# Patient Record
Sex: FEMALE | Race: WHITE | NOT HISPANIC OR LATINO | Employment: UNEMPLOYED | URBAN - METROPOLITAN AREA
[De-identification: names, ages, dates, MRNs, and addresses within clinical notes are randomized per-mention and may not be internally consistent; named-entity substitution may affect disease eponyms.]

---

## 2021-03-03 ENCOUNTER — HOSPITAL ENCOUNTER (INPATIENT)
Facility: HOSPITAL | Age: 61
LOS: 6 days | Discharge: HOME | End: 2021-03-09
Attending: EMERGENCY MEDICINE | Admitting: INTERNAL MEDICINE

## 2021-03-03 DIAGNOSIS — R13.10 DYSPHAGIA, UNSPECIFIED TYPE: ICD-10-CM

## 2021-03-03 DIAGNOSIS — E87.1 HYPONATREMIA: Primary | ICD-10-CM

## 2021-03-03 DIAGNOSIS — R53.1 GENERAL WEAKNESS: ICD-10-CM

## 2021-03-03 LAB
ALBUMIN SERPL-MCNC: 4.7 G/DL (ref 3.5–5.2)
ALBUMIN/GLOB SERPL: 2 G/DL
ALP SERPL-CCNC: 55 U/L (ref 39–117)
ALT SERPL W P-5'-P-CCNC: <5 U/L (ref 1–33)
ANION GAP SERPL CALCULATED.3IONS-SCNC: 12 MMOL/L (ref 5–15)
AST SERPL-CCNC: 42 U/L (ref 1–32)
B-HCG UR QL: NEGATIVE
BASOPHILS # BLD AUTO: 0.01 10*3/MM3 (ref 0–0.2)
BASOPHILS NFR BLD AUTO: 0.1 % (ref 0–1.5)
BILIRUB SERPL-MCNC: 1 MG/DL (ref 0–1.2)
BUN SERPL-MCNC: 8 MG/DL (ref 8–23)
BUN/CREAT SERPL: 12.9 (ref 7–25)
CALCIUM SPEC-SCNC: 9.3 MG/DL (ref 8.6–10.5)
CHLORIDE SERPL-SCNC: 73 MMOL/L (ref 98–107)
CO2 SERPL-SCNC: 22 MMOL/L (ref 22–29)
CREAT SERPL-MCNC: 0.62 MG/DL (ref 0.57–1)
D-LACTATE SERPL-SCNC: 1.1 MMOL/L (ref 0.5–2)
DEPRECATED RDW RBC AUTO: 35.5 FL (ref 37–54)
EOSINOPHIL # BLD AUTO: 0 10*3/MM3 (ref 0–0.4)
EOSINOPHIL NFR BLD AUTO: 0 % (ref 0.3–6.2)
ERYTHROCYTE [DISTWIDTH] IN BLOOD BY AUTOMATED COUNT: 11.6 % (ref 12.3–15.4)
GFR SERPL CREATININE-BSD FRML MDRD: 98 ML/MIN/1.73
GLOBULIN UR ELPH-MCNC: 2.4 GM/DL
GLUCOSE SERPL-MCNC: 114 MG/DL (ref 65–99)
HCT VFR BLD AUTO: 33.3 % (ref 34–46.6)
HGB BLD-MCNC: 12.3 G/DL (ref 12–15.9)
HOLD SPECIMEN: NORMAL
IMM GRANULOCYTES # BLD AUTO: 0.02 10*3/MM3 (ref 0–0.05)
IMM GRANULOCYTES NFR BLD AUTO: 0.2 % (ref 0–0.5)
INTERNAL NEGATIVE CONTROL: NEGATIVE
INTERNAL POSITIVE CONTROL: POSITIVE
LIPASE SERPL-CCNC: 17 U/L (ref 13–60)
LYMPHOCYTES # BLD AUTO: 1.09 10*3/MM3 (ref 0.7–3.1)
LYMPHOCYTES NFR BLD AUTO: 11.5 % (ref 19.6–45.3)
Lab: NORMAL
MCH RBC QN AUTO: 31.6 PG (ref 26.6–33)
MCHC RBC AUTO-ENTMCNC: 36.9 G/DL (ref 31.5–35.7)
MCV RBC AUTO: 85.6 FL (ref 79–97)
MONOCYTES # BLD AUTO: 0.76 10*3/MM3 (ref 0.1–0.9)
MONOCYTES NFR BLD AUTO: 8 % (ref 5–12)
NEUTROPHILS NFR BLD AUTO: 7.61 10*3/MM3 (ref 1.7–7)
NEUTROPHILS NFR BLD AUTO: 80.2 % (ref 42.7–76)
NRBC BLD AUTO-RTO: 0 /100 WBC (ref 0–0.2)
PLATELET # BLD AUTO: 268 10*3/MM3 (ref 140–450)
PMV BLD AUTO: 8.9 FL (ref 6–12)
POTASSIUM SERPL-SCNC: 3.5 MMOL/L (ref 3.5–5.2)
PROT SERPL-MCNC: 7.1 G/DL (ref 6–8.5)
RBC # BLD AUTO: 3.89 10*6/MM3 (ref 3.77–5.28)
SODIUM SERPL-SCNC: 107 MMOL/L (ref 136–145)
WBC # BLD AUTO: 9.49 10*3/MM3 (ref 3.4–10.8)
WHOLE BLOOD HOLD SPECIMEN: NORMAL
WHOLE BLOOD HOLD SPECIMEN: NORMAL

## 2021-03-03 PROCEDURE — 80053 COMPREHEN METABOLIC PANEL: CPT | Performed by: EMERGENCY MEDICINE

## 2021-03-03 PROCEDURE — 84300 ASSAY OF URINE SODIUM: CPT | Performed by: INTERNAL MEDICINE

## 2021-03-03 PROCEDURE — 80048 BASIC METABOLIC PNL TOTAL CA: CPT | Performed by: EMERGENCY MEDICINE

## 2021-03-03 PROCEDURE — 83930 ASSAY OF BLOOD OSMOLALITY: CPT | Performed by: INTERNAL MEDICINE

## 2021-03-03 PROCEDURE — 82533 TOTAL CORTISOL: CPT | Performed by: INTERNAL MEDICINE

## 2021-03-03 PROCEDURE — 85025 COMPLETE CBC W/AUTO DIFF WBC: CPT

## 2021-03-03 PROCEDURE — 81001 URINALYSIS AUTO W/SCOPE: CPT | Performed by: EMERGENCY MEDICINE

## 2021-03-03 PROCEDURE — 83935 ASSAY OF URINE OSMOLALITY: CPT | Performed by: INTERNAL MEDICINE

## 2021-03-03 PROCEDURE — 84443 ASSAY THYROID STIM HORMONE: CPT | Performed by: NURSE PRACTITIONER

## 2021-03-03 PROCEDURE — 99284 EMERGENCY DEPT VISIT MOD MDM: CPT

## 2021-03-03 PROCEDURE — 81025 URINE PREGNANCY TEST: CPT | Performed by: EMERGENCY MEDICINE

## 2021-03-03 PROCEDURE — 83690 ASSAY OF LIPASE: CPT

## 2021-03-03 PROCEDURE — 83605 ASSAY OF LACTIC ACID: CPT

## 2021-03-03 PROCEDURE — 84439 ASSAY OF FREE THYROXINE: CPT | Performed by: NURSE PRACTITIONER

## 2021-03-03 RX ORDER — SODIUM CHLORIDE 9 MG/ML
10 INJECTION INTRAVENOUS AS NEEDED
Status: DISCONTINUED | OUTPATIENT
Start: 2021-03-03 | End: 2021-03-05

## 2021-03-04 ENCOUNTER — APPOINTMENT (OUTPATIENT)
Dept: CT IMAGING | Facility: HOSPITAL | Age: 61
End: 2021-03-04

## 2021-03-04 ENCOUNTER — APPOINTMENT (OUTPATIENT)
Dept: GENERAL RADIOLOGY | Facility: HOSPITAL | Age: 61
End: 2021-03-04

## 2021-03-04 PROBLEM — F32.A DEPRESSION: Status: ACTIVE | Noted: 2021-03-04

## 2021-03-04 PROBLEM — G20.C PARKINSONISM: Status: ACTIVE | Noted: 2021-03-04

## 2021-03-04 PROBLEM — G20 PARKINSONISM: Status: ACTIVE | Noted: 2021-03-04

## 2021-03-04 PROBLEM — Z86.69 HISTORY OF MIGRAINE HEADACHES: Status: ACTIVE | Noted: 2021-03-04

## 2021-03-04 PROBLEM — E03.9 HYPOTHYROIDISM: Status: ACTIVE | Noted: 2021-03-04

## 2021-03-04 PROBLEM — E87.1 HYPONATREMIA: Status: ACTIVE | Noted: 2021-03-04

## 2021-03-04 PROBLEM — Z85.3 HX OF BREAST CANCER: Status: ACTIVE | Noted: 2021-03-04

## 2021-03-04 LAB
ANION GAP SERPL CALCULATED.3IONS-SCNC: 10 MMOL/L (ref 5–15)
BACTERIA UR QL AUTO: ABNORMAL /HPF
BASOPHILS # BLD AUTO: 0 10*3/MM3 (ref 0–0.2)
BASOPHILS NFR BLD AUTO: 0 % (ref 0–1.5)
BILIRUB UR QL STRIP: NEGATIVE
BUN SERPL-MCNC: 8 MG/DL (ref 8–23)
BUN/CREAT SERPL: 13.6 (ref 7–25)
CALCIUM SPEC-SCNC: 9 MG/DL (ref 8.6–10.5)
CHLORIDE SERPL-SCNC: 74 MMOL/L (ref 98–107)
CLARITY UR: CLEAR
CO2 SERPL-SCNC: 23 MMOL/L (ref 22–29)
COLOR UR: YELLOW
CORTIS SERPL-MCNC: 42.38 MCG/DL
CREAT SERPL-MCNC: 0.59 MG/DL (ref 0.57–1)
DEPRECATED RDW RBC AUTO: 35.7 FL (ref 37–54)
EOSINOPHIL # BLD AUTO: 0 10*3/MM3 (ref 0–0.4)
EOSINOPHIL NFR BLD AUTO: 0 % (ref 0.3–6.2)
ERYTHROCYTE [DISTWIDTH] IN BLOOD BY AUTOMATED COUNT: 11.5 % (ref 12.3–15.4)
FLUAV RNA RESP QL NAA+PROBE: NOT DETECTED
FLUBV RNA RESP QL NAA+PROBE: NOT DETECTED
GFR SERPL CREATININE-BSD FRML MDRD: 104 ML/MIN/1.73
GLUCOSE SERPL-MCNC: 113 MG/DL (ref 65–99)
GLUCOSE UR STRIP-MCNC: ABNORMAL MG/DL
HCT VFR BLD AUTO: 32.4 % (ref 34–46.6)
HGB BLD-MCNC: 11.9 G/DL (ref 12–15.9)
HGB UR QL STRIP.AUTO: NEGATIVE
HYALINE CASTS UR QL AUTO: ABNORMAL /LPF
IMM GRANULOCYTES # BLD AUTO: 0.03 10*3/MM3 (ref 0–0.05)
IMM GRANULOCYTES NFR BLD AUTO: 0.3 % (ref 0–0.5)
KETONES UR QL STRIP: ABNORMAL
LEUKOCYTE ESTERASE UR QL STRIP.AUTO: NEGATIVE
LYMPHOCYTES # BLD AUTO: 1.15 10*3/MM3 (ref 0.7–3.1)
LYMPHOCYTES NFR BLD AUTO: 11.9 % (ref 19.6–45.3)
MAGNESIUM SERPL-MCNC: 1.5 MG/DL (ref 1.6–2.4)
MCH RBC QN AUTO: 31.3 PG (ref 26.6–33)
MCHC RBC AUTO-ENTMCNC: 36.7 G/DL (ref 31.5–35.7)
MCV RBC AUTO: 85.3 FL (ref 79–97)
MONOCYTES # BLD AUTO: 0.92 10*3/MM3 (ref 0.1–0.9)
MONOCYTES NFR BLD AUTO: 9.6 % (ref 5–12)
MUCOUS THREADS URNS QL MICRO: ABNORMAL /HPF
NEUTROPHILS NFR BLD AUTO: 7.53 10*3/MM3 (ref 1.7–7)
NEUTROPHILS NFR BLD AUTO: 78.2 % (ref 42.7–76)
NITRITE UR QL STRIP: NEGATIVE
NRBC BLD AUTO-RTO: 0 /100 WBC (ref 0–0.2)
OSMOLALITY SERPL: 227 MOSM/KG (ref 275–295)
OSMOLALITY UR: 509 MOSM/KG (ref 300–1100)
PH UR STRIP.AUTO: 6 [PH] (ref 5–8)
PLATELET # BLD AUTO: 258 10*3/MM3 (ref 140–450)
PMV BLD AUTO: 9 FL (ref 6–12)
POTASSIUM SERPL-SCNC: 3.5 MMOL/L (ref 3.5–5.2)
PROT UR QL STRIP: ABNORMAL
RBC # BLD AUTO: 3.8 10*6/MM3 (ref 3.77–5.28)
RBC # UR: ABNORMAL /HPF
REF LAB TEST METHOD: ABNORMAL
SARS-COV-2 RNA RESP QL NAA+PROBE: NOT DETECTED
SODIUM SERPL-SCNC: 107 MMOL/L (ref 136–145)
SODIUM SERPL-SCNC: 111 MMOL/L (ref 136–145)
SODIUM SERPL-SCNC: 114 MMOL/L (ref 136–145)
SODIUM SERPL-SCNC: 114 MMOL/L (ref 136–145)
SODIUM SERPL-SCNC: 115 MMOL/L (ref 136–145)
SODIUM SERPL-SCNC: 119 MMOL/L (ref 136–145)
SODIUM UR-SCNC: <20 MMOL/L
SP GR UR STRIP: 1.03 (ref 1–1.03)
SQUAMOUS #/AREA URNS HPF: ABNORMAL /HPF
T4 FREE SERPL-MCNC: 1.86 NG/DL (ref 0.93–1.7)
TSH SERPL DL<=0.05 MIU/L-ACNC: 1.26 UIU/ML (ref 0.27–4.2)
UROBILINOGEN UR QL STRIP: ABNORMAL
WBC # BLD AUTO: 9.63 10*3/MM3 (ref 3.4–10.8)
WBC UR QL AUTO: ABNORMAL /HPF

## 2021-03-04 PROCEDURE — 85025 COMPLETE CBC W/AUTO DIFF WBC: CPT | Performed by: INTERNAL MEDICINE

## 2021-03-04 PROCEDURE — 25010000002 ONDANSETRON PER 1 MG: Performed by: EMERGENCY MEDICINE

## 2021-03-04 PROCEDURE — 25010000002 ENOXAPARIN PER 10 MG: Performed by: NURSE PRACTITIONER

## 2021-03-04 PROCEDURE — 70450 CT HEAD/BRAIN W/O DYE: CPT

## 2021-03-04 PROCEDURE — 74177 CT ABD & PELVIS W/CONTRAST: CPT

## 2021-03-04 PROCEDURE — 71045 X-RAY EXAM CHEST 1 VIEW: CPT

## 2021-03-04 PROCEDURE — 87636 SARSCOV2 & INF A&B AMP PRB: CPT | Performed by: INTERNAL MEDICINE

## 2021-03-04 PROCEDURE — 99223 1ST HOSP IP/OBS HIGH 75: CPT | Performed by: INTERNAL MEDICINE

## 2021-03-04 PROCEDURE — 25010000002 MAGNESIUM SULFATE 2 GM/50ML SOLUTION: Performed by: INTERNAL MEDICINE

## 2021-03-04 PROCEDURE — 84295 ASSAY OF SERUM SODIUM: CPT | Performed by: INTERNAL MEDICINE

## 2021-03-04 PROCEDURE — 83735 ASSAY OF MAGNESIUM: CPT | Performed by: INTERNAL MEDICINE

## 2021-03-04 PROCEDURE — 25010000002 IOPAMIDOL 61 % SOLUTION: Performed by: EMERGENCY MEDICINE

## 2021-03-04 RX ORDER — MAGNESIUM SULFATE HEPTAHYDRATE 40 MG/ML
2 INJECTION, SOLUTION INTRAVENOUS AS NEEDED
Status: DISCONTINUED | OUTPATIENT
Start: 2021-03-04 | End: 2021-03-09 | Stop reason: HOSPADM

## 2021-03-04 RX ORDER — LEVOTHYROXINE SODIUM 0.07 MG/1
75 TABLET ORAL DAILY
COMMUNITY

## 2021-03-04 RX ORDER — ONDANSETRON HYDROCHLORIDE 8 MG/1
TABLET, FILM COATED ORAL EVERY 8 HOURS PRN
COMMUNITY
End: 2023-02-02

## 2021-03-04 RX ORDER — POTASSIUM CHLORIDE 7.45 MG/ML
10 INJECTION INTRAVENOUS
Status: DISCONTINUED | OUTPATIENT
Start: 2021-03-04 | End: 2021-03-09 | Stop reason: HOSPADM

## 2021-03-04 RX ORDER — SODIUM CHLORIDE 9 MG/ML
100 INJECTION, SOLUTION INTRAVENOUS CONTINUOUS
Status: DISCONTINUED | OUTPATIENT
Start: 2021-03-04 | End: 2021-03-04

## 2021-03-04 RX ORDER — MAGNESIUM SULFATE HEPTAHYDRATE 40 MG/ML
4 INJECTION, SOLUTION INTRAVENOUS AS NEEDED
Status: DISCONTINUED | OUTPATIENT
Start: 2021-03-04 | End: 2021-03-09 | Stop reason: HOSPADM

## 2021-03-04 RX ORDER — SODIUM CHLORIDE 0.9 % (FLUSH) 0.9 %
10 SYRINGE (ML) INJECTION EVERY 12 HOURS SCHEDULED
Status: DISCONTINUED | OUTPATIENT
Start: 2021-03-04 | End: 2021-03-09 | Stop reason: HOSPADM

## 2021-03-04 RX ORDER — POTASSIUM CHLORIDE 1.5 G/1.77G
40 POWDER, FOR SOLUTION ORAL AS NEEDED
Status: DISCONTINUED | OUTPATIENT
Start: 2021-03-04 | End: 2021-03-09 | Stop reason: HOSPADM

## 2021-03-04 RX ORDER — UBIDECARENONE 75 MG
50 CAPSULE ORAL DAILY
COMMUNITY

## 2021-03-04 RX ORDER — MULTIPLE VITAMINS W/ MINERALS TAB 9MG-400MCG
1 TAB ORAL DAILY
COMMUNITY

## 2021-03-04 RX ORDER — ESCITALOPRAM OXALATE 10 MG/1
10 TABLET ORAL DAILY
COMMUNITY
End: 2023-02-02

## 2021-03-04 RX ORDER — ONDANSETRON 2 MG/ML
4 INJECTION INTRAMUSCULAR; INTRAVENOUS ONCE
Status: COMPLETED | OUTPATIENT
Start: 2021-03-04 | End: 2021-03-04

## 2021-03-04 RX ORDER — SODIUM CHLORIDE 0.9 % (FLUSH) 0.9 %
10 SYRINGE (ML) INJECTION AS NEEDED
Status: DISCONTINUED | OUTPATIENT
Start: 2021-03-04 | End: 2021-03-09 | Stop reason: HOSPADM

## 2021-03-04 RX ORDER — PANTOPRAZOLE SODIUM 40 MG/10ML
40 INJECTION, POWDER, LYOPHILIZED, FOR SOLUTION INTRAVENOUS
Status: DISCONTINUED | OUTPATIENT
Start: 2021-03-04 | End: 2021-03-05

## 2021-03-04 RX ORDER — POTASSIUM CHLORIDE 750 MG/1
40 CAPSULE, EXTENDED RELEASE ORAL AS NEEDED
Status: DISCONTINUED | OUTPATIENT
Start: 2021-03-04 | End: 2021-03-09 | Stop reason: HOSPADM

## 2021-03-04 RX ORDER — ACETAMINOPHEN 325 MG/1
650 TABLET ORAL EVERY 6 HOURS PRN
Status: DISCONTINUED | OUTPATIENT
Start: 2021-03-04 | End: 2021-03-09 | Stop reason: HOSPADM

## 2021-03-04 RX ADMIN — ONDANSETRON 4 MG: 2 INJECTION INTRAMUSCULAR; INTRAVENOUS at 00:29

## 2021-03-04 RX ADMIN — POTASSIUM CHLORIDE 40 MEQ: 10 CAPSULE, COATED, EXTENDED RELEASE ORAL at 10:41

## 2021-03-04 RX ADMIN — PANTOPRAZOLE SODIUM 40 MG: 40 INJECTION, POWDER, FOR SOLUTION INTRAVENOUS at 06:18

## 2021-03-04 RX ADMIN — IOPAMIDOL 100 ML: 612 INJECTION, SOLUTION INTRAVENOUS at 01:14

## 2021-03-04 RX ADMIN — MAGNESIUM SULFATE HEPTAHYDRATE 2 G: 2 INJECTION, SOLUTION INTRAVENOUS at 13:30

## 2021-03-04 RX ADMIN — SODIUM CHLORIDE, PRESERVATIVE FREE 10 ML: 5 INJECTION INTRAVENOUS at 20:08

## 2021-03-04 RX ADMIN — SODIUM CHLORIDE 125 ML/HR: 9 INJECTION, SOLUTION INTRAVENOUS at 01:42

## 2021-03-04 RX ADMIN — ENOXAPARIN SODIUM 40 MG: 40 INJECTION SUBCUTANEOUS at 08:36

## 2021-03-04 RX ADMIN — POTASSIUM CHLORIDE 40 MEQ: 10 CAPSULE, COATED, EXTENDED RELEASE ORAL at 15:33

## 2021-03-04 RX ADMIN — MAGNESIUM SULFATE HEPTAHYDRATE 2 G: 2 INJECTION, SOLUTION INTRAVENOUS at 10:41

## 2021-03-04 RX ADMIN — MAGNESIUM SULFATE HEPTAHYDRATE 2 G: 2 INJECTION, SOLUTION INTRAVENOUS at 15:33

## 2021-03-04 RX ADMIN — ACETAMINOPHEN 650 MG: 325 TABLET ORAL at 09:15

## 2021-03-04 NOTE — PROGRESS NOTES
"Clinical Nutrition Note      Patient Name: Carolina Duarte  MRN: [id number]  Admission date: 3/3/2021      Multidisciplinary Rounds    Additional information obtained during MDR:  Pt adm w/ hyponatremia; close monitoring for today. Okay to begin diet , no fld restriction at this time likely medication related.    Current diet: Diet Regular    Oral Nutrition Supplement:     Pertinent medical data reviewed:  No nutrition risk identified on nursing screen; MST score \"0\"    Intervention:  Menu provided, pt advised of alternate selections; menu adjusted  Plan of care and goals reviewed    Monitor:  RD to follow per protocol      Nori Ochoa MS,RD,LD  03/04/21 14:34 EST  Time: 15  mins       "

## 2021-03-04 NOTE — ED PROVIDER NOTES
EMERGENCY DEPARTMENT ENCOUNTER      Pt Name: Carolina Duarte  MRN: [id number]  YOB: 1960  Date of evaluation: 3/3/2021  Provider: Kishan Mcgarry DO    CHIEF COMPLAINT       Chief Complaint   Patient presents with   • Vomiting         HISTORY OF PRESENT ILLNESS  (Location/Symptom, Timing/Onset, Context/Setting, Quality, Duration, Modifying Factors, Severity.)   Carolina Duarte is a 60 y.o. female who presents to the emergency department for evaluation of generalized weakness and worsening over the last few days, dehydration concerned secondary to increased nausea and vomiting.  Denies any diarrhea.  Patient denies any fever chills or known sick contacts.  She does a few days ago felt some mild chest pain is since resolved, denies any abdominal pain or flank pain.  No recent changes to her medications.  She does underlying after diagnosis of Parkinson's for which she takes medications.  She denies any recent medication adjustments.  Eyes notes patient has been increasingly weaker, having difficulties with ambulation recently.  No fall, no head trauma or injury.  Patient denies any unilateral weakness, numbness or tingling, difficulties with speech.  She has no other acute systemic complaints at this time.      Nursing notes were reviewed.    REVIEW OF SYSTEMS    (2-9 systems for level 4, 10 or more for level 5)   ROS:  General:  No fevers, no chills, + generalized weakness  Cardiovascular:  No chest pain, no palpitations  Respiratory:  No shortness of breath, no cough, no wheezing  Gastrointestinal:  No pain, positive nausea, vomiting, no diarrhea  Musculoskeletal:  No muscle pain, no joint pain  Skin:  No rash, no easy bruising  Neurologic:  No speech problems, no headache, no extremity numbness, no extremity tingling, no extremity weakness  Psychiatric:  No anxiety  Genitourinary:  No dysuria, no hematuria    Except as noted above the remainder of the review of systems was reviewed and  negative.       PAST MEDICAL HISTORY   No past medical history on file.      SURGICAL HISTORY     No past surgical history on file.      CURRENT MEDICATIONS       Current Facility-Administered Medications:   •  enoxaparin (LOVENOX) syringe 40 mg, 40 mg, Subcutaneous, Q24H, Anitha Franks APRN  •  influenza vac split quad (FLUZONE,FLUARIX,AFLURIA,FLULAVAL) injection 0.5 mL, 0.5 mL, Intramuscular, During Hospitalization, Anitha Franks APRN  •  pantoprazole (PROTONIX) injection 40 mg, 40 mg, Intravenous, Q AM, Anitha Franks APRN  •  Sodium Chloride (PF) 0.9 % 10 mL, 10 mL, Intravenous, PRN, Kishan Mcgarry DO  •  sodium chloride 0.9 % flush 10 mL, 10 mL, Intravenous, Q12H, Anitha Franks APRN  •  sodium chloride 0.9 % flush 10 mL, 10 mL, Intravenous, PRN, Anitha rFanks APRN  •  sodium chloride 0.9 % infusion, 125 mL/hr, Intravenous, Continuous, Kishan Mcgarry DO, Last Rate: 125 mL/hr at 03/04/21 0142, 125 mL/hr at 03/04/21 0142  No current outpatient medications on file.    ALLERGIES     Patient has no known allergies.    FAMILY HISTORY     No family history on file.       SOCIAL HISTORY       Social History     Socioeconomic History   • Marital status:      Spouse name: Not on file   • Number of children: Not on file   • Years of education: Not on file   • Highest education level: Not on file         PHYSICAL EXAM    (up to 7 for level 4, 8 or more for level 5)     Vitals:    03/04/21 0200 03/04/21 0215 03/04/21 0230 03/04/21 0245   BP: 142/82 130/69 136/59 114/70   Pulse: 77 80 81 80   Resp:       Temp:       SpO2: 94% 96% 97% 92%   Weight:       Height:           Physical Exam  General : Patient is awake, alert, oriented, in no acute distress, nontoxic appearing, mild dementia noted  HEENT: Pupils are equally round and reactive to light, EOMI, conjunctivae clear  Neck: Neck is supple, full range of motion, trachea midline  Cardiac: Heart regular rate, rhythm, positive systolic ejection  murmur  Lungs: Lungs are clear to auscultation, there is no wheezing, rhonchi, or rales. There is no use of accessory muscles  Abdomen: Abdomen is soft, nontender, nondistended. There are no firm or pulsatile masses, no rebound rigidity or guarding.   Musculoskeletal: 5 out of 5 strength in all 4 extremities.  No focal muscle deficits are appreciated  Neuro: Motor intact, sensory intact, level of consciousness is normal, patient is awake alert answer questions appropriately, no focal neurological deficit on examination, no unilateral weakness.  Very mild resting tremor.  Dermatology: Skin is warm and dry  Psych: Mentation is grossly normal, cognition is consistent with mild dementia, parkinsonian changes noted.  Mild. Affect is appropriate.      DIAGNOSTIC RESULTS     EKG: All EKG's are interpreted by the Emergency Department Physician who either signs or Co-signs this chart in the absence of a cardiologist.    No orders to display       RADIOLOGY:   Non-plain film images such as CT, Ultrasound and MRI are read by the radiologist. Plain radiographic images are visualized and preliminarily interpreted by the emergency physician with the below findings:      [] Radiologist's Report Reviewed:  CT Abdomen Pelvis With Contrast   Final Result   1.  No acute abnormality seen within the abdomen or pelvis.   2.  Tiny hiatal hernia and potential mild distal esophageal wall thickening which may indicate active esophagitis.      Signer Name: Efrain Rogers MD    Signed: 3/4/2021 1:35 AM    Workstation Name: [facility]     [facility]      CT Head Without Contrast   Final Result   Negative head CT examination.      Signer Name: Efrain Rogers MD    Signed: 3/4/2021 1:31 AM    [facility]: [facility]     [facility]            ED BEDSIDE ULTRASOUND:   Performed by ED Physician - none    LABS:    I have reviewed and interpreted all of the currently available lab  results from this visit (if applicable):  Results for orders placed or performed during the hospital encounter of 03/03/21   Comprehensive Metabolic Panel    Specimen: Blood   Result Value Ref Range    Glucose 114 (H) 65 - 99 mg/dL    BUN 8 8 - 23 mg/dL    Creatinine 0.62 0.57 - 1.00 mg/dL    Sodium 107 (C) 136 - 145 mmol/L    Potassium 3.5 3.5 - 5.2 mmol/L    Chloride 73 (L) 98 - 107 mmol/L    CO2 22.0 22.0 - 29.0 mmol/L    Calcium 9.3 8.6 - 10.5 mg/dL    Total Protein 7.1 6.0 - 8.5 g/dL    Albumin 4.70 3.50 - 5.20 g/dL    ALT (SGPT) <5 1 - 33 U/L    AST (SGOT) 42 (H) 1 - 32 U/L    Alkaline Phosphatase 55 39 - 117 U/L    Total Bilirubin 1.0 0.0 - 1.2 mg/dL    eGFR Non African Amer 98 >60 mL/min/1.73    Globulin 2.4 gm/dL    A/G Ratio 2.0 g/dL    BUN/Creatinine Ratio 12.9 7.0 - 25.0    Anion Gap 12.0 5.0 - 15.0 mmol/L   Lipase    Specimen: Blood   Result Value Ref Range    Lipase 17 13 - 60 U/L   Urinalysis With Microscopic If Indicated (No Culture) - Urine, Clean Catch    Specimen: Urine, Clean Catch   Result Value Ref Range    Color, UA Yellow Yellow, Straw    Appearance, UA Clear Clear    pH, UA 6.0 5.0 - 8.0    Specific Gravity, UA 1.029 1.001 - 1.030    Glucose,  mg/dL (Trace) (A) Negative    Ketones, UA 80 mg/dL (3+) (A) Negative    Bilirubin, UA Negative Negative    Blood, UA Negative Negative    Protein, UA 30 mg/dL (1+) (A) Negative    Leuk Esterase, UA Negative Negative    Nitrite, UA Negative Negative    Urobilinogen, UA 0.2 E.U./dL 0.2 - 1.0 E.U./dL   Lactic Acid, Plasma    Specimen: Blood   Result Value Ref Range    Lactate 1.1 0.5 - 2.0 mmol/L   Gray Top - Ice   Result Value Ref Range    Extra Tube Hold for add-ons.    CBC Auto Differential    Specimen: Blood   Result Value Ref Range    WBC 9.49 3.40 - 10.80 10*3/mm3    RBC 3.89 3.77 - 5.28 10*6/mm3    Hemoglobin 12.3 12.0 - 15.9 g/dL    Hematocrit 33.3 (L) 34.0 - 46.6 %    MCV 85.6 79.0 - 97.0 fL    MCH 31.6 26.6 - 33.0 pg    MCHC 36.9 (H) 31.5  - 35.7 g/dL    RDW 11.6 (L) 12.3 - 15.4 %    RDW-SD 35.5 (L) 37.0 - 54.0 fl    MPV 8.9 6.0 - 12.0 fL    Platelets 268 140 - 450 10*3/mm3    Neutrophil % 80.2 (H) 42.7 - 76.0 %    Lymphocyte % 11.5 (L) 19.6 - 45.3 %    Monocyte % 8.0 5.0 - 12.0 %    Eosinophil % 0.0 (L) 0.3 - 6.2 %    Basophil % 0.1 0.0 - 1.5 %    Immature Grans % 0.2 0.0 - 0.5 %    Neutrophils, Absolute 7.61 (H) 1.70 - 7.00 10*3/mm3    Lymphocytes, Absolute 1.09 0.70 - 3.10 10*3/mm3    Monocytes, Absolute 0.76 0.10 - 0.90 10*3/mm3    Eosinophils, Absolute 0.00 0.00 - 0.40 10*3/mm3    Basophils, Absolute 0.01 0.00 - 0.20 10*3/mm3    Immature Grans, Absolute 0.02 0.00 - 0.05 10*3/mm3    nRBC 0.0 0.0 - 0.2 /100 WBC   Basic Metabolic Panel    Specimen: Blood   Result Value Ref Range    Glucose 113 (H) 65 - 99 mg/dL    BUN 8 8 - 23 mg/dL    Creatinine 0.59 0.57 - 1.00 mg/dL    Sodium 107 (C) 136 - 145 mmol/L    Potassium 3.5 3.5 - 5.2 mmol/L    Chloride 74 (L) 98 - 107 mmol/L    CO2 23.0 22.0 - 29.0 mmol/L    Calcium 9.0 8.6 - 10.5 mg/dL    eGFR Non African Amer 104 >60 mL/min/1.73    BUN/Creatinine Ratio 13.6 7.0 - 25.0    Anion Gap 10.0 5.0 - 15.0 mmol/L   Urinalysis, Microscopic Only - Urine, Clean Catch    Specimen: Urine, Clean Catch   Result Value Ref Range    RBC, UA 7-12 (A) None Seen, 0-2 /HPF    WBC, UA 3-5 (A) None Seen, 0-2 /HPF    Bacteria, UA None Seen None Seen, Trace /HPF    Squamous Epithelial Cells, UA 3-6 (A) None Seen, 0-2 /HPF    Hyaline Casts, UA 0-6 0 - 6 /LPF    Mucus, UA Large/3+ (A) None Seen, Trace /HPF    Methodology Manual Light Microscopy    POC Pregnancy, Urine    Specimen: Urine   Result Value Ref Range    HCG, Urine, QL Negative Negative    Lot Number [id number]     Internal Positive Control Positive     Internal Negative Control Negative    Light Blue Top   Result Value Ref Range    Extra Tube hold for add-on    Green Top (Gel)   Result Value Ref Range    Extra Tube Hold for add-ons.    Lavender Top   Result Value Ref  Range    Extra Tube hold for add-on    Gold Top - SST   Result Value Ref Range    Extra Tube Hold for add-ons.         All other labs were within normal range or not returned as of this dictation.      EMERGENCY DEPARTMENT COURSE and DIFFERENTIAL DIAGNOSIS/MDM:   Vitals:    Vitals:    03/04/21 0200 03/04/21 0215 03/04/21 0230 03/04/21 0245   BP: 142/82 130/69 136/59 114/70   Pulse: 77 80 81 80   Resp:       Temp:       SpO2: 94% 96% 97% 92%   Weight:       Height:                Patient generalized weakness, nausea and vomiting worsened over the last few days.  On arrival her vital signs are stable, initial blood work reveals a significant hyponatremia with a sodium of 107, chloride of 74.  We did start the patient on normal saline, will plan on rechecking the sodium levels which did confirm a significant hyponatremia.  Patient is awake and alert, no neurological test on examination, likely this is a hypovolemic hyponatremia with a history of chronic hyponatremia which I do not have any prior labs for further evaluation.  Given his significantly low sodium level likely is a progressively chronic low sodium level.  Secondary to her increased weakness with significant hyponatremia we will plan on admission to the hospital under the intensive care team.  Case discussed with Dr. Ron for admission.      MEDICATIONS ADMINISTERED IN ED:  Medications   Sodium Chloride (PF) 0.9 % 10 mL (has no administration in time range)   sodium chloride 0.9 % infusion (125 mL/hr Intravenous New Bag 3/4/21 0142)   influenza vac split quad (FLUZONE,FLUARIX,AFLURIA,FLULAVAL) injection 0.5 mL (has no administration in time range)   sodium chloride 0.9 % flush 10 mL (has no administration in time range)   sodium chloride 0.9 % flush 10 mL (has no administration in time range)   enoxaparin (LOVENOX) syringe 40 mg (has no administration in time range)   pantoprazole (PROTONIX) injection 40 mg (has no administration in time range)    ondansetron (ZOFRAN) injection 4 mg (4 mg Intravenous Given 3/4/21 0029)   iopamidol (ISOVUE-300) 61 % injection 100 mL (100 mL Intravenous Given 3/4/21 0114)       PROCEDURES:  Procedures    CRITICAL CARE TIME    Total Critical Care time was 30 minutes, excluding separately reportable procedures.  Acute significant electrolyte abnormalities, significant hyponatremia, altered mental status and generalized weakness require multiple reevaluation's, discussions with consultants.  There was a high probability of clinically significant/life threatening deterioration in the patient's condition which required my urgent intervention.      FINAL IMPRESSION      1. Hyponatremia    2. General weakness          DISPOSITION/PLAN     ED Disposition     ED Disposition Condition Comment    Decision to Admit  Level of Care: Critical Care [6]   Diagnosis: Hyponatremia [988069]   Admitting Physician: CECY LOPEZ [[id number]]   Certification: I Certify That Inpatient Hospital Services Are Medically Necessary For Greater Than 2 Midnights            PATIENT REFERRED TO:  No follow-up provider specified.    DISCHARGE MEDICATIONS:     Medication List      You have not been prescribed any medications.             Comment: Please note this report has been produced using speech recognition software.      Kishan Mcgarry DO  Attending Emergency Physician               Kishan Mcgarry DO  03/04/21 0259

## 2021-03-04 NOTE — PROGRESS NOTES
Discharge Planning Assessment  [facility]     Patient Name: Carolina Duarte  MRN: [id number]  Today's Date: 3/4/2021    Admit Date: 3/3/2021    Discharge Needs Assessment     Row Name 03/04/21 1048       Living Environment    Lives With  spouse    Current Living Arrangements  home/apartment/condo house with stairs    Primary Care Provided by  self    Family Caregiver if Needed  spouse    Quality of Family Relationships  unable to assess    Able to Return to Prior Arrangements  yes       Transition Planning    Patient/Family Anticipates Transition to  home with family    Transportation Anticipated  family or friend will provide       Discharge Needs Assessment    Equipment Currently Used at Home  none    Concerns to be Addressed  no discharge needs identified        Discharge Plan     Row Name 03/04/21 1050       Plan    Plan  home    Patient/Family in Agreement with Plan  yes    Plan Comments  I met with Mrs. Duarte at bedside to discuss discharge planning.  She lives in [place] with spouse.  Independent with ADL's.  No DME.  Has Rx coverage.  No needs identified.  spouse to transport home.    Final Discharge Disposition Code  01 - home or self-care        Continued Care and Services - Admitted Since 3/3/2021    Coordination has not been started for this encounter.       Expected Discharge Date and Time     Expected Discharge Date Expected Discharge Time    Mar 7, 2021         Demographic Summary     Row Name 03/04/21 1047       General Information    Admission Type  inpatient    Reason for Consult  discharge planning        Functional Status     Row Name 03/04/21 1048       Functional Status    Usual Activity Tolerance  good    Current Activity Tolerance  good       Functional Status, IADL    Medications  independent    Meal Preparation  independent    Housekeeping  independent    Laundry  independent    Shopping  independent        Psychosocial    No documentation.       Abuse/Neglect    No documentation.        Legal    No documentation.       Substance Abuse    No documentation.       Patient Forms    No documentation.           Gayle Youngblood RN

## 2021-03-04 NOTE — H&P
Intensive Care Admission Note     Hyponatremia    History of Present Illness     This very nice 60-year-old patient with a history of Parkinson's disease, prior breast cancer status post lumpectomy and radiation therapy currently maintained on tamoxifen, migraine headaches, hypothyroidism currently on replacement, and depression who presents for several days of nausea and vomiting as well as generalized weakness. There has not been any report of shortness of breath, fevers, chills, or sweats.  No diarrhea.  There have been no recent medication changes per the patient although a full list of her medications is not forthcoming.  Hemodynamics were stable upon arrival that the patient had complained of some dizziness.  There has been no report of seizure activity, no hallucinations, and no focal weakness.  Unfortunately, the patient does get confused easily with her history and it does take her a while to recall some of the details of her recent medical history.  She is oriented to self and situation but not time at this point.  She has denied edema.  She denies excessive thirst, decreased appetite, or excessive intake of water.    Lab evaluation was significant for a sodium level of 107 with a low chloride.  There is no sign of renal dysfunction.    CT scan of the abdomen was essentially negative with the exception of a small hiatal hernia.  CT scan of the head was normal.    The patient has been started on normal saline and I was consulted for transfer to the intensive care unit for close monitoring.    Problem List, Surgical History, Family, Social History, and ROS     Patient Active Problem List    Diagnosis   • *Hyponatremia [E87.1]   • Hx of breast cancer [Z85.3]   • Depression [F32.9]   • Hypothyroidism [E03.9]   • Parkinsonism (CMS/HCC) [G20]   • History of migraine headaches [Z86.69]     Past surgical history is significant for lumpectomy for breast cancer    Allergies: Codeine    Medication list per old  "records:  Carbidopa levodopa  Lexapro  Prozac  Amantadine  Tamoxifen  Synthroid  Linzess    No family history on file.  Social History     Tobacco Use   • Smoking status: Not on file   Substance Use Topics   • Alcohol use: Not on file   • Drug use: Not on file         Review of Systems  A full review of systems has been completed and it is negative except as mentioned expressly in the HPI.  However this is likely limited by the patient's mild altered mental status.    Physical Exam and Clinical Information   /70   Pulse 80   Temp 96.4 °F (35.8 °C)   Resp 14   Ht 167.6 cm (66\")   Wt 70.8 kg (156 lb)   SpO2 92%   BMI 25.18 kg/m²   Physical Exam  Vitals signs and nursing note reviewed.   Constitutional:       General: She is not in acute distress.     Appearance: Normal appearance. She is normal weight. She is not ill-appearing, toxic-appearing or diaphoretic.   HENT:      Head: Normocephalic and atraumatic.      Nose: Nose normal.      Mouth/Throat:      Mouth: Mucous membranes are moist.      Pharynx: No oropharyngeal exudate.   Eyes:      Extraocular Movements: Extraocular movements intact.      Pupils: Pupils are equal, round, and reactive to light.   Neck:      Musculoskeletal: Normal range of motion.   Cardiovascular:      Rate and Rhythm: Normal rate and regular rhythm.      Pulses: Normal pulses.      Heart sounds: Normal heart sounds. No murmur. No friction rub.   Pulmonary:      Effort: Pulmonary effort is normal. No respiratory distress.      Breath sounds: No wheezing.   Abdominal:      General: Abdomen is flat. Bowel sounds are normal. There is no distension.      Tenderness: There is no abdominal tenderness. There is no guarding or rebound.   Musculoskeletal: Normal range of motion.      Right lower leg: No edema.      Left lower leg: No edema.   Skin:     General: Skin is warm.      Capillary Refill: Capillary refill takes less than 2 seconds.      Findings: No bruising, erythema or lesion. "   Neurological:      Mental Status: She is alert.      Comments: Mild resting tremor to the upper extremities.  Patient is alert, she is oriented to self and situation.  No focal weakness.         Results from last 7 days   Lab Units 03/03/21  2133   WBC 10*3/mm3 9.49   HEMOGLOBIN g/dL 12.3   PLATELETS 10*3/mm3 268     Results from last 7 days   Lab Units 03/03/21  2322 03/03/21  2133   SODIUM mmol/L 107* 107*   POTASSIUM mmol/L 3.5 3.5   CO2 mmol/L 23.0 22.0   BUN mg/dL 8 8   CREATININE mg/dL 0.59 0.62   GLUCOSE mg/dL 113* 114*     Estimated Creatinine Clearance: 111.9 mL/min (by C-G formula based on SCr of 0.59 mg/dL).          Lab Results   Component Value Date    LACTATE 1.1 03/03/2021          I reviewed the patient's results and images.     [facility]     Hospital Problem List     * (Principal) Hyponatremia    Hx of breast cancer    Depression    Hypothyroidism    Parkinsonism (CMS/HCC)    History of migraine headaches        Plan/Recommendations     At this point, she is on several medications which could be implicated in hyponatremia.  While she has had some nausea and some vomiting, she does not look especially dry on physical examination and renal function is normal.  Chief among possible medication would be the serotonin reuptake inhibitors that she is on (at least by the chart).  Certainly we will need to get confirmation that she is on the medications that I have listed above.  We will plan to do the following:    Admit to the intensive care unit.  Continue with normal saline infusion for now.  We will go ahead and check sodiums every 4 hours until we ensure that she has a slow increase.  At this point she is not having any neurological symptoms and I feel that we will not require hypertonic saline and less we are stalled out in increasing her sodium.  Verify medications with pharmacy.  Morning labs have been ordered and in addition we will check a serum cortisol, serum osmolality, thyroid-stimulating  hormone, free T4, urine osmolality, and urine sodium  The patient has had negative scans of her abdomen and head.  I am going to go ahead and do a screening chest x-ray to look for any sign of chest pathology.  Hold all medications for now and we will slowly restart these and we have confirmed which when she is on which ones could be contributing to this.  Antiemesis as necessary.  DVT prophylaxis with Lovenox.    The patient remains at high risk of worsening secondary to severe electrolyte abnormalities demonstrated by severe hyponatremia and need to monitor her serum sodium for overcorrection.    High level of risk due to:  illness with threat to life or bodily function and drugs requiring intensive monitoring for toxicity.    here is a high probability of imminent or life threatening deterioration in the patient’s condition.      Darius Ron MD, [facility]  Pulmonary and Critical Care Medicine  03/04/21 03:05 EST     CC: Provider, No Known

## 2021-03-04 NOTE — PLAN OF CARE
Goal Outcome Evaluation:  Plan of Care Reviewed With: patient  Progress: improving  Outcome Summary: Patient admit from ED for hyponatremia, nausea/vomiting. Per  patient has not had BM in 12 days so administered fleet enemas on morning of 3/3/21 and had several BMs and then began having nausea/vomiting and weakness with increased confusion. VS stable on admit but patient is disoriented to time and place. Some belching and intermittent  nausea noted. Denies pain/discomfort. Plan is for serial sodium checks Q4 and to stabilize electrolytes and manage symptoms. Next sodium check pending. Will continue to monitor.

## 2021-03-05 LAB
ANION GAP SERPL CALCULATED.3IONS-SCNC: 8 MMOL/L (ref 5–15)
BASOPHILS # BLD AUTO: 0.01 10*3/MM3 (ref 0–0.2)
BASOPHILS NFR BLD AUTO: 0.2 % (ref 0–1.5)
BUN SERPL-MCNC: 8 MG/DL (ref 8–23)
BUN/CREAT SERPL: 9.8 (ref 7–25)
CALCIUM SPEC-SCNC: 8.5 MG/DL (ref 8.6–10.5)
CHLORIDE SERPL-SCNC: 90 MMOL/L (ref 98–107)
CO2 SERPL-SCNC: 25 MMOL/L (ref 22–29)
CREAT SERPL-MCNC: 0.82 MG/DL (ref 0.57–1)
DEPRECATED RDW RBC AUTO: 39.9 FL (ref 37–54)
EOSINOPHIL # BLD AUTO: 0 10*3/MM3 (ref 0–0.4)
EOSINOPHIL NFR BLD AUTO: 0 % (ref 0.3–6.2)
ERYTHROCYTE [DISTWIDTH] IN BLOOD BY AUTOMATED COUNT: 12.3 % (ref 12.3–15.4)
GFR SERPL CREATININE-BSD FRML MDRD: 71 ML/MIN/1.73
GLUCOSE SERPL-MCNC: 98 MG/DL (ref 65–99)
HCT VFR BLD AUTO: 36.4 % (ref 34–46.6)
HGB BLD-MCNC: 12.9 G/DL (ref 12–15.9)
IMM GRANULOCYTES # BLD AUTO: 0.01 10*3/MM3 (ref 0–0.05)
IMM GRANULOCYTES NFR BLD AUTO: 0.2 % (ref 0–0.5)
LYMPHOCYTES # BLD AUTO: 1.47 10*3/MM3 (ref 0.7–3.1)
LYMPHOCYTES NFR BLD AUTO: 25 % (ref 19.6–45.3)
MAGNESIUM SERPL-MCNC: 2.6 MG/DL (ref 1.6–2.4)
MCH RBC QN AUTO: 31.5 PG (ref 26.6–33)
MCHC RBC AUTO-ENTMCNC: 35.4 G/DL (ref 31.5–35.7)
MCV RBC AUTO: 88.8 FL (ref 79–97)
MONOCYTES # BLD AUTO: 0.95 10*3/MM3 (ref 0.1–0.9)
MONOCYTES NFR BLD AUTO: 16.2 % (ref 5–12)
NEUTROPHILS NFR BLD AUTO: 3.44 10*3/MM3 (ref 1.7–7)
NEUTROPHILS NFR BLD AUTO: 58.4 % (ref 42.7–76)
NRBC BLD AUTO-RTO: 0 /100 WBC (ref 0–0.2)
PHOSPHATE SERPL-MCNC: 2.6 MG/DL (ref 2.5–4.5)
PLATELET # BLD AUTO: 235 10*3/MM3 (ref 140–450)
PMV BLD AUTO: 8.9 FL (ref 6–12)
POTASSIUM SERPL-SCNC: 4.1 MMOL/L (ref 3.5–5.2)
RBC # BLD AUTO: 4.1 10*6/MM3 (ref 3.77–5.28)
SODIUM SERPL-SCNC: 121 MMOL/L (ref 136–145)
SODIUM SERPL-SCNC: 123 MMOL/L (ref 136–145)
SODIUM SERPL-SCNC: 123 MMOL/L (ref 136–145)
SODIUM SERPL-SCNC: 125 MMOL/L (ref 136–145)
SODIUM SERPL-SCNC: 126 MMOL/L (ref 136–145)
SODIUM SERPL-SCNC: 127 MMOL/L (ref 136–145)
WBC # BLD AUTO: 5.88 10*3/MM3 (ref 3.4–10.8)

## 2021-03-05 PROCEDURE — 84295 ASSAY OF SERUM SODIUM: CPT | Performed by: INTERNAL MEDICINE

## 2021-03-05 PROCEDURE — 25010000002 ENOXAPARIN PER 10 MG: Performed by: NURSE PRACTITIONER

## 2021-03-05 PROCEDURE — 99232 SBSQ HOSP IP/OBS MODERATE 35: CPT | Performed by: INTERNAL MEDICINE

## 2021-03-05 PROCEDURE — 80048 BASIC METABOLIC PNL TOTAL CA: CPT | Performed by: NURSE PRACTITIONER

## 2021-03-05 PROCEDURE — 84100 ASSAY OF PHOSPHORUS: CPT | Performed by: NURSE PRACTITIONER

## 2021-03-05 PROCEDURE — 85025 COMPLETE CBC W/AUTO DIFF WBC: CPT | Performed by: NURSE PRACTITIONER

## 2021-03-05 PROCEDURE — 83735 ASSAY OF MAGNESIUM: CPT | Performed by: NURSE PRACTITIONER

## 2021-03-05 RX ORDER — DEXTROSE MONOHYDRATE 50 MG/ML
25 INJECTION, SOLUTION INTRAVENOUS CONTINUOUS
Status: DISCONTINUED | OUTPATIENT
Start: 2021-03-05 | End: 2021-03-08

## 2021-03-05 RX ADMIN — CARBIDOPA AND LEVODOPA 1.5 TABLET: 25; 100 TABLET ORAL at 15:10

## 2021-03-05 RX ADMIN — ENOXAPARIN SODIUM 40 MG: 40 INJECTION SUBCUTANEOUS at 08:28

## 2021-03-05 RX ADMIN — PANTOPRAZOLE SODIUM 40 MG: 40 INJECTION, POWDER, FOR SOLUTION INTRAVENOUS at 05:57

## 2021-03-05 RX ADMIN — CARBIDOPA AND LEVODOPA 1.5 TABLET: 25; 100 TABLET ORAL at 09:47

## 2021-03-05 RX ADMIN — CARBIDOPA AND LEVODOPA 1.5 TABLET: 25; 100 TABLET ORAL at 22:22

## 2021-03-05 RX ADMIN — DEXTROSE MONOHYDRATE 50 ML/HR: 50 INJECTION, SOLUTION INTRAVENOUS at 08:28

## 2021-03-05 RX ADMIN — SODIUM CHLORIDE, PRESERVATIVE FREE 10 ML: 5 INJECTION INTRAVENOUS at 08:29

## 2021-03-05 NOTE — PROGRESS NOTES
Continued Stay Note   Mila     Patient Name: Carolina Duarte  MRN: [id number]  Today's Date: 3/5/2021    Admit Date: 3/3/2021    Discharge Plan     Row Name 03/05/21 1134       Plan    Plan  home    Patient/Family in Agreement with Plan  yes    Plan Comments  I met with Mrs. Duarte in room to discuss discharge planning.  Her plan is home with spouse.  Still waiting for sodium to return to [facility].  Has order to transfer to [facility].   following.    Final Discharge Disposition Code  01 - home or self-care        Discharge Codes    No documentation.       Expected Discharge Date and Time     Expected Discharge Date Expected Discharge Time    Mar 7, 2021             Gayle Youngblood RN

## 2021-03-05 NOTE — PROGRESS NOTES
Intensive Care Follow-up     Hospital:  LOS: 2 days   Ms. Carolina Duarte, 60 y.o. female is followed for:   Hyponatremia            History of present illness:   This very nice 60-year-old patient with a history of Parkinson's disease, prior breast cancer status post lumpectomy and radiation therapy currently maintained on tamoxifen, migraine headaches, hypothyroidism currently on replacement, and depression who presents for several days of nausea and vomiting as well as generalized weakness. There has not been any report of shortness of breath, fevers, chills, or sweats.  No diarrhea.  There have been no recent medication changes per the patient although a full list of her medications is not forthcoming.  Hemodynamics were stable upon arrival that the patient had complained of some dizziness.  There has been no report of seizure activity, no hallucinations, and no focal weakness.  Unfortunately, the patient does get confused easily with her history and it does take her a while to recall some of the details of her recent medical history.  She is oriented to self and situation but not time at this point.  She has denied edema.  She denies excessive thirst, decreased appetite, or excessive intake of water.     Lab evaluation was significant for a sodium level of 107 with a low chloride.  There is no sign of renal dysfunction.     CT scan of the abdomen was essentially negative with the exception of a small hiatal hernia.  CT scan of the head was normal.     The patient has been started on normal saline and I was consulted for transfer to the intensive care unit for close monitoring.      Subjective   Interval History:  Overnight patient's sodium continued to increase.  This morning up to 123.  This is slightly quicker than the right we would like her to be under but notably this is been a natural correction by her own standards.  We have not been providing any extra supplementation of sodium at this point.  All  saline was discontinued on the morning of 3/4/2021.             The patient's past medical, surgical and social history were reviewed and updated in Epic as appropriate.       Objective     Infusions:  dextrose, 50 mL/hr, Last Rate: 50 mL/hr (03/05/21 0828)      Medications:  carbidopa-levodopa, 1.5 tablet, Oral, TID  enoxaparin, 40 mg, Subcutaneous, Q24H  sodium chloride, 10 mL, Intravenous, Q12H      I reviewed the patient's medications.    Vital Sign Min/Max for last 24 hours  Temp  Min: 97.5 °F (36.4 °C)  Max: 98.3 °F (36.8 °C)   BP  Min: 90/43  Max: 143/106   Pulse  Min: 63  Max: 89   Resp  Min: 16  Max: 20   SpO2  Min: 95 %  Max: 98 %   No data recorded       Input/Output for last 24 hour shift  03/04 0701 - 03/05 0700  In: 1572.5 [P.O.:950; I.V.:622.5]  Out: 3425 [Urine:3425]      GENERAL : NAD, conversant  RESPIRATORY/THORAX : normal respiratory effort and no intercostal retractions, Coarse crackles bilaterally  CARDIOVASCULAR : Normal S1/S2, RRR. 1+ lower ext edema.  GASTROINTESTINAL : Soft, NT/ND. BS x 4 normoactive. No hepatosplenomegaly.  MUSCULOSKELETAL : No cyanosis, clubbing, or ischemia  NEUROLOGICAL: alert and oriented to person, place and time  PSYCHOLOGICAL : Appropriate affect    Results from last 7 days   Lab Units 03/05/21 0447 03/04/21 0413 03/03/21  2133   WBC 10*3/mm3 5.88 9.63 9.49   HEMOGLOBIN g/dL 12.9 11.9* 12.3   PLATELETS 10*3/mm3 235 258 268     Results from last 7 days   Lab Units 03/05/21  0759 03/05/21  0447 03/05/21  0001  03/04/21  0413 03/03/21  2322 03/03/21  2133   SODIUM mmol/L 123* 123* 121*   < > 111* 107* 107*   POTASSIUM mmol/L  --  4.1  --   --   --  3.5 3.5   CO2 mmol/L  --  25.0  --   --   --  23.0 22.0   BUN mg/dL  --  8  --   --   --  8 8   CREATININE mg/dL  --  0.82  --   --   --  0.59 0.62   MAGNESIUM mg/dL  --  2.6*  --   --  1.5*  --   --    PHOSPHORUS mg/dL  --  2.6  --   --   --   --   --    GLUCOSE mg/dL  --  98  --   --   --  113* 114*    < > = values in  this interval not displayed.     Estimated Creatinine Clearance: 74.4 mL/min (by C-G formula based on SCr of 0.82 mg/dL).          I reviewed the patient's new clinical results.  I reviewed the patient's new imaging results/reports including actual images and agree with reports.         Assessment/Plan   Impression        Hyponatremia    Hx of breast cancer    Depression    Hypothyroidism    Parkinsonism (CMS/HCC)    History of migraine headaches       Plan        60-year-old female with a history of depression, breast cancer, hypothyroidism, parkinsonism, and migraines.  Who agree sitting to [facility] on 3/4/2021 with symptomatic hyponatremia.  Initial sodium was 107, she was placed on normal saline between the hours of 2 AM and 6AM on 3/4/2021 with an elevation up to 114.  Saline was discontinued at that time.  And received no other sodium supplementation.  Sodium remained stable throughout the day up until 2100 at which point she did elevate up to 119 on her own.  Then up to 124 as of the morning of 3/5/2021.    · I will go and start D5 water at 50 cc/h to prevent any further escalation.  Although I do think it should be noted that even though her sodium is rising it is doing on her own and we are not providing her with any extra supplementation, or fluid restriction to rise the sodium at a quicker rate.  Goal sodium by tomorrow morning would be no higher than 127.  · I will restart her Parkinson medication with carbidopa levodopa  · Urine studies are more consistent with dehydration, but I still would like to hold the Lexapro if she does not find it making significant difference and would only cause more issues with her underlying hyponatremia.  · Continue p.o. diet  · Mobilize patient  · Aggressive pulmonary toilet  · Patient is okay to be transferred to the floor    Plan of care and goals reviewed with multidisciplinary/antibiotic stewardship team during rounds.   I discussed the patient's  findings and my recommendations with patient and nursing staff     Mau Mac,   Pulmonary, Critical care and Sleep Medicine

## 2021-03-05 NOTE — PROGRESS NOTES
"  Clinical Nutrition Note      Patient Name: Carolina Duarte  MRN: [id number]  Admission date: 3/3/2021      Multidisciplinary Rounds    Additional information obtained during MDR:  RN reports pt is fine this morning. Na+ 123 , D5W infusion at 50 ml/hr; MD will continue Na checks every 4 hrs and transfer pt to floor.    Current diet: Diet Regular    Oral Nutrition Supplement:    Pertinent medical data reviewed:  No nutrition risk identified on nursing screen; MST score \"0\"    Intervention:  Plan of care and goals reviewed    Monitor:  RD to follow per protocol      Nori Ochoa MS,RD,LD  03/05/21 10:40 EST  Time: 15  mins       "

## 2021-03-06 LAB
SODIUM SERPL-SCNC: 126 MMOL/L (ref 136–145)
SODIUM SERPL-SCNC: 127 MMOL/L (ref 136–145)
SODIUM SERPL-SCNC: 128 MMOL/L (ref 136–145)
SODIUM SERPL-SCNC: 130 MMOL/L (ref 136–145)

## 2021-03-06 PROCEDURE — 99233 SBSQ HOSP IP/OBS HIGH 50: CPT | Performed by: HOSPITALIST

## 2021-03-06 PROCEDURE — 84295 ASSAY OF SERUM SODIUM: CPT | Performed by: HOSPITALIST

## 2021-03-06 PROCEDURE — 84295 ASSAY OF SERUM SODIUM: CPT | Performed by: INTERNAL MEDICINE

## 2021-03-06 PROCEDURE — 92610 EVALUATE SWALLOWING FUNCTION: CPT

## 2021-03-06 PROCEDURE — 25010000002 ENOXAPARIN PER 10 MG: Performed by: INTERNAL MEDICINE

## 2021-03-06 RX ORDER — FAMOTIDINE 20 MG/1
20 TABLET, FILM COATED ORAL
Status: DISCONTINUED | OUTPATIENT
Start: 2021-03-06 | End: 2021-03-09 | Stop reason: HOSPADM

## 2021-03-06 RX ORDER — LEVOTHYROXINE SODIUM 0.07 MG/1
75 TABLET ORAL
Status: DISCONTINUED | OUTPATIENT
Start: 2021-03-06 | End: 2021-03-09 | Stop reason: HOSPADM

## 2021-03-06 RX ORDER — CALCIUM CARBONATE 200(500)MG
2 TABLET,CHEWABLE ORAL 3 TIMES DAILY PRN
Status: DISCONTINUED | OUTPATIENT
Start: 2021-03-06 | End: 2021-03-09 | Stop reason: HOSPADM

## 2021-03-06 RX ADMIN — CARBIDOPA AND LEVODOPA 1.5 TABLET: 25; 100 TABLET ORAL at 09:40

## 2021-03-06 RX ADMIN — FAMOTIDINE 20 MG: 20 TABLET, FILM COATED ORAL at 16:49

## 2021-03-06 RX ADMIN — CARBIDOPA AND LEVODOPA 1.5 TABLET: 25; 100 TABLET ORAL at 21:09

## 2021-03-06 RX ADMIN — FAMOTIDINE 20 MG: 20 TABLET, FILM COATED ORAL at 09:40

## 2021-03-06 RX ADMIN — ENOXAPARIN SODIUM 40 MG: 40 INJECTION SUBCUTANEOUS at 09:40

## 2021-03-06 RX ADMIN — CARBIDOPA AND LEVODOPA 1.5 TABLET: 25; 100 TABLET ORAL at 16:49

## 2021-03-06 RX ADMIN — SODIUM CHLORIDE, PRESERVATIVE FREE 10 ML: 5 INJECTION INTRAVENOUS at 21:09

## 2021-03-06 RX ADMIN — LEVOTHYROXINE SODIUM 75 MCG: 75 TABLET ORAL at 09:41

## 2021-03-06 NOTE — PLAN OF CARE
Goal Outcome Evaluation:  Plan of Care Reviewed With: patient  Progress: improving  Outcome Summary: PT with no new complaints. VSS. A+O x 4. Na between 126-127 today on serial checks.

## 2021-03-06 NOTE — THERAPY EVALUATION
Acute Care - Speech Language Pathology   Swallow Initial Evaluation [facility]   Clinical Swallow Evaluation     Patient Name: Carolina Duarte  : 1960  MRN: [id number]  Today's Date: 3/6/2021               Admit Date: 3/3/2021    Visit Dx:     ICD-10-CM ICD-9-CM   1. Hyponatremia  E87.1 276.1   2. General weakness  R53.1 780.79   3. Dysphagia, unspecified type  R13.10 787.20     Patient Active Problem List   Diagnosis   • Hyponatremia   • Hx of breast cancer   • Depression   • Hypothyroidism   • Parkinsonism (CMS/HCC)   • History of migraine headaches     History reviewed. No pertinent past medical history.  History reviewed. No pertinent surgical history.     SWALLOW EVALUATION (last 72 hours)      SLP Adult Swallow Evaluation     Row Name 21 1015                   Rehab Evaluation    Document Type  evaluation  -        Patient Observations  alert;cooperative  -        Care Plan Review  evaluation/treatment results reviewed;patient/other agree to care plan  -        Care Plan Review, Other Participant(s)  significant other  -           General Information    Patient Profile Reviewed  yes  -SM        Pertinent History Of Current Problem  Parkinsons, hyponatremia, noted reflux and difficulty swallowing. MD started on pepcid/tums and if did not improve, recommended SLP consult. Pt complaints of difficulty increased to nursing, SLP consulted.   -SM        Current Method of Nutrition  regular textures;thin liquids  -        Prior Level of Function-Communication  cognitive-linguistic impairment;other (see comments) word finding and memory difficulties  -        Prior Level of Function-Swallowing  esophageal concerns;other (see comments) hiatal hernia. Acutely worsened recently  -        Plans/Goals Discussed with  patient;spouse/S.O.;agreed upon  -        Barriers to Rehab  none identified  -        Patient's Goals for Discharge  eat/drink without coughing/choking  -           Pain     "Additional Documentation  Pain Scale: Numbers Pre/Post-Treatment (Group)  -           Pain Scale: Numbers Pre/Post-Treatment    Pretreatment Pain Rating  0/10 - no pain  -        Posttreatment Pain Rating  0/10 - no pain  -           Clinical Swallow Eval    Oral Prep Phase  WFL  -SM        Oral Transit  WFL  -SM        Oral Residue  WFL  -SM        Pharyngeal Phase  suspected pharyngeal impairment  -        Esophageal Phase  suspected esophageal impairment  -        Clinical Swallow Evaluation Summary  Pt c/o \"sticking\" and discomfort. Worse with liquid wash following regular solid trial. Discussed giving Pepcid/Tums some time to alleviate. Pt worried with swallow, especially given PD. Would like to pursue instrumental swallow to further assess. SLP will arrange for Monday 3/8.   -           Clinical Impression    SLP Swallowing Diagnosis  other (see comments) suspected pharyngoesophageal dysphagia  -        Swallow Criteria for Skilled Therapeutic Interventions Met  demonstrates skilled criteria  -           Recommendations    SLP Diet Recommendation  regular textures;thin liquids  -        Recommended Diagnostics  VFSS (MBS);other (see comments) + limited UGI, will arrange for 3/8  -        Recommended Precautions and Strategies  general aspiration precautions;reflux precautions  -        Oral Care Recommendations  Oral Care BID/PRN  -        SLP Rec. for Method of Medication Administration  meds whole;with pudding or applesauce  -          User Key  (r) = Recorded By, (t) = Taken By, (c) = Cosigned By    Initials Name Effective Dates    Velvet Rubio MS CCC-SLP 08/09/20 -           EDUCATION  The patient has been educated in the following areas:   Dysphagia (Swallowing Impairment) Modified Diet Instruction.    SLP Recommendation and Plan  SLP Swallowing Diagnosis: other (see comments) (suspected pharyngoesophageal dysphagia)  SLP Diet Recommendation: regular textures, thin " liquids  Recommended Precautions and Strategies: general aspiration precautions, reflux precautions  SLP Rec. for Method of Medication Administration: meds whole, with pudding or applesauce        Recommended Diagnostics: VFSS (MBS), other (see comments) (+ limited UGI, will arrange for 3/8)  Swallow Criteria for Skilled Therapeutic Interventions Met: demonstrates skilled criteria                      Plan of Care Reviewed With: patient, spouse  Progress: no change           Time Calculation:   Time Calculation- SLP     Row Name 03/06/21 1101             Time Calculation- SLP    SLP Start Time  1015  -      SLP Received On  03/06/21  -        User Key  (r) = Recorded By, (t) = Taken By, (c) = Cosigned By    Initials Name Provider Type    Velvet Rubio MS CCC-SLP Speech and Language Pathologist          Therapy Charges for Today     Code Description Service Date Service Provider Modifiers Qty    [id number]  ST EVAL ORAL PHARYNG SWALLOW 3 3/6/2021 Velvet hCiu MS CCC-SLP GN 1            Patient was not wearing a face mask and did not exhibit coughing during this therapy encounter.  Procedure performed was aerosolizing, involved close contact (within 6 feet for at least 15 minutes or longer), and did not involve contact with infectious secretions or specimens.  Therapist used appropriate personal protective equipment including gloves, standard procedure mask and eye protection.  Appropriate PPE was worn during the entire therapy session.  Hand hygiene was completed before and after therapy session.       Velvet Chiu MS CCC-SLP  3/6/2021

## 2021-03-06 NOTE — PLAN OF CARE
Goal Outcome Evaluation:  Plan of Care Reviewed With: patient, spouse  Progress: no change     SLP dysphagia evaluation completed. Discussed with pt r/t giving time for Pepcid/Tums to assist and SLP to return to see if any further needs. Pt quite worried about her swallow function, especially given PD. Will arrange for MBS + limited UGI Monday, 3/8. Until then, regular diet + aspiration and reflux precautions, and trial pills whole in applesauce. Please see note for further details and recommendations.

## 2021-03-06 NOTE — PROGRESS NOTES
[facility] Medicine Services  PROGRESS NOTE    Patient Name: Carolina Duarte  : 1960  MRN: [id number]    Date of Admission: 3/3/2021  Primary Care Physician: Provider, No Known    Subjective   Subjective     CC:  Hyponatremia    HPI:  Notes reflux and difficulty swallowing. No f/c. No n/v. No diarrhea. No BM yet. No dyspnea .    Review of Systems   Constitutional: Positive for activity change and fatigue.   HENT: Positive for trouble swallowing.    Respiratory: Negative.    Cardiovascular: Negative.    Gastrointestinal: Positive for constipation.   Genitourinary: Negative.    Musculoskeletal: Negative.    Neurological: Negative.    Psychiatric/Behavioral: Positive for decreased concentration.         Objective   Objective     Vital Signs:   Temp:  [98 °F (36.7 °C)-98.2 °F (36.8 °C)] 98.2 °F (36.8 °C)  Heart Rate:  [72-95] 95  Resp:  [18-20] 18  BP: ()/(57-81) 123/81        Physical Exam:  NAD, alert and oriented x 3  OP clear, MMM  PERRL  Neck supple  No LAD  RRR  CTAB  +BS, ND, NT, soft  No c/c/e  No rashes  PINO  Normal affect    Results Reviewed:  Results from last 7 days   Lab Units 21  0413 21  2133   WBC 10*3/mm3 5.88 9.63 9.49   HEMOGLOBIN g/dL 12.9 11.9* 12.3   HEMATOCRIT % 36.4 32.4* 33.3*   PLATELETS 10*3/mm3 235 258 268     Results from last 7 days   Lab Units 21  0540 21  0029 21  2322 21  2133   SODIUM mmol/L 127* 128* 126* 123* 107* 107*   POTASSIUM mmol/L  --   --   --  4.1 3.5 3.5   CHLORIDE mmol/L  --   --   --  90* 74* 73*   CO2 mmol/L  --   --   --  25.0 23.0 22.0   BUN mg/dL  --   --   --  8 8 8   CREATININE mg/dL  --   --   --  0.82 0.59 0.62   GLUCOSE mg/dL  --   --   --  98 113* 114*   CALCIUM mg/dL  --   --   --  8.5* 9.0 9.3   ALT (SGPT) U/L  --   --   --   --   --  <5   AST (SGOT) U/L  --   --   --   --   --  42*     Estimated Creatinine Clearance: 73.9 mL/min (by C-G  formula based on SCr of 0.82 mg/dL).    Microbiology Results Abnormal     Procedure Component Value - Date/Time    COVID PRE-OP / PRE-PROCEDURE SCREENING ORDER (NO ISOLATION) - Swab, Nasopharynx [[id number]]  (Normal) Collected: 03/04/21 0417    Lab Status: Final result Specimen: Swab from Nasopharynx Updated: 03/04/21 0813    Narrative:      The following orders were created for panel order COVID PRE-OP / PRE-PROCEDURE SCREENING ORDER (NO ISOLATION) - Swab, Nasopharynx.  Procedure                               Abnormality         Status                     ---------                               -----------         ------                     COVID-19 and FLU A/B PCR...[[id number]]  Normal              Final result                 Please view results for these tests on the individual orders.    COVID-19 and FLU A/B PCR - Swab, Nasopharynx [[id number]]  (Normal) Collected: 03/04/21 0417    Lab Status: Final result Specimen: Swab from Nasopharynx Updated: 03/04/21 0813     COVID19 Not Detected     Influenza A PCR Not Detected     Influenza B PCR Not Detected    Narrative:      Fact sheet for providers: https://www.fda.gov/media/929428/download    Fact sheet for patients: https://www.fda.gov/media/350616/download    Test performed by PCR.          Imaging Results (Last 24 Hours)     ** No results found for the last 24 hours. **              I have reviewed the medications:  Scheduled Meds:carbidopa-levodopa, 1.5 tablet, Oral, TID  enoxaparin, 40 mg, Subcutaneous, Q24H  sodium chloride, 10 mL, Intravenous, Q12H      Continuous Infusions:dextrose, 50 mL/hr, Last Rate: 50 mL/hr (03/05/21 0828)      PRN Meds:.•  acetaminophen  •  influenza vaccine  •  magnesium sulfate **OR** magnesium sulfate **OR** magnesium sulfate  •  potassium chloride **OR** potassium chloride **OR** potassium chloride  •  sodium chloride    Assessment/Plan   Assessment & Plan     Active Hospital Problems    Diagnosis  POA   • **Hyponatremia [E87.1]   Yes   • Hx of breast cancer [Z85.3]  Not Applicable   • Depression [F32.9]  Unknown   • Hypothyroidism [E03.9]  Unknown   • Parkinsonism (CMS/HCC) [G20]  Unknown   • History of migraine headaches [Z86.69]  Not Applicable      Resolved Hospital Problems   No resolved problems to display.        Brief Hospital Course to date:  Carolina Duarte is a 60 y.o. female with history of depression, breast cancer, hypothyroidism, Parkinsonism and migraines with symptomatic hyponatremia.    Hyponatremia  --improved, remains on free water, continue to observe at 127, and if remains stable, decrease free water this evening and allow sodium to continue to rise as it has without supplementation, and continue to hold lexapro    Dysphagia  --notes HH/reflux, start pepcid/tums  --this may warrant a speech eval if fails to improve    Hypothyroidism  --synthroid    Mobilize    DVT Prophylaxis:  Lovenox      Disposition: I expect the patient to be discharged TBD.    CODE STATUS:   Code Status and Medical Interventions:   Ordered at: 03/04/21 0238     Code Status:    CPR     Medical Interventions (Level of Support Prior to Arrest):    Full       Job Reinoso MD  03/06/21

## 2021-03-07 LAB
SODIUM SERPL-SCNC: 126 MMOL/L (ref 136–145)
SODIUM SERPL-SCNC: 129 MMOL/L (ref 136–145)
SODIUM SERPL-SCNC: 129 MMOL/L (ref 136–145)
SODIUM SERPL-SCNC: 130 MMOL/L (ref 136–145)
SODIUM SERPL-SCNC: 130 MMOL/L (ref 136–145)
SODIUM SERPL-SCNC: 131 MMOL/L (ref 136–145)

## 2021-03-07 PROCEDURE — 84295 ASSAY OF SERUM SODIUM: CPT | Performed by: INTERNAL MEDICINE

## 2021-03-07 PROCEDURE — 25010000002 ENOXAPARIN PER 10 MG: Performed by: INTERNAL MEDICINE

## 2021-03-07 PROCEDURE — 99233 SBSQ HOSP IP/OBS HIGH 50: CPT | Performed by: HOSPITALIST

## 2021-03-07 RX ORDER — ROPINIROLE 0.5 MG/1
0.25 TABLET, FILM COATED ORAL EVERY 12 HOURS SCHEDULED
Status: DISCONTINUED | OUTPATIENT
Start: 2021-03-07 | End: 2021-03-09 | Stop reason: HOSPADM

## 2021-03-07 RX ADMIN — SODIUM CHLORIDE, PRESERVATIVE FREE 10 ML: 5 INJECTION INTRAVENOUS at 20:00

## 2021-03-07 RX ADMIN — FAMOTIDINE 20 MG: 20 TABLET, FILM COATED ORAL at 16:26

## 2021-03-07 RX ADMIN — ROPINIROLE HYDROCHLORIDE 0.25 MG: 0.5 TABLET, FILM COATED ORAL at 10:03

## 2021-03-07 RX ADMIN — LEVOTHYROXINE SODIUM 75 MCG: 75 TABLET ORAL at 05:42

## 2021-03-07 RX ADMIN — ROPINIROLE HYDROCHLORIDE 0.25 MG: 0.5 TABLET, FILM COATED ORAL at 20:00

## 2021-03-07 RX ADMIN — ENOXAPARIN SODIUM 40 MG: 40 INJECTION SUBCUTANEOUS at 08:48

## 2021-03-07 RX ADMIN — CARBIDOPA AND LEVODOPA 1.5 TABLET: 25; 100 TABLET ORAL at 20:00

## 2021-03-07 RX ADMIN — SODIUM CHLORIDE, PRESERVATIVE FREE 10 ML: 5 INJECTION INTRAVENOUS at 08:48

## 2021-03-07 RX ADMIN — CARBIDOPA AND LEVODOPA 1.5 TABLET: 25; 100 TABLET ORAL at 08:47

## 2021-03-07 RX ADMIN — FAMOTIDINE 20 MG: 20 TABLET, FILM COATED ORAL at 08:47

## 2021-03-07 RX ADMIN — DEXTROSE MONOHYDRATE 50 ML/HR: 50 INJECTION, SOLUTION INTRAVENOUS at 02:04

## 2021-03-07 RX ADMIN — CARBIDOPA AND LEVODOPA 1.5 TABLET: 25; 100 TABLET ORAL at 16:26

## 2021-03-07 NOTE — PROGRESS NOTES
[facility] Medicine Services  PROGRESS NOTE    Patient Name: Carolina Duarte  : 1960  MRN: [id number]    Date of Admission: 3/3/2021  Primary Care Physician: Provider, No Known    Subjective   Subjective     CC:  Hyponatremia    HPI:  Notes reflux and difficulty swallowing. No f/c. Notes LE restlessness. No n/v. No dyspnea.    Review of Systems   Constitutional: Positive for activity change and fatigue.   HENT: Positive for trouble swallowing.    Respiratory: Negative.    Cardiovascular: Negative.    Gastrointestinal: Positive for constipation.   Genitourinary: Negative.    Musculoskeletal: Negative.    Neurological: Negative.    Psychiatric/Behavioral: Positive for decreased concentration.   No change in ROS from 3/6 otherwise    Objective   Objective     Vital Signs:   Temp:  [97.6 °F (36.4 °C)-98.1 °F (36.7 °C)] 97.9 °F (36.6 °C)  Heart Rate:  [67-94] 67  Resp:  [18] 18  BP: (133-139)/(69-84) 139/84        Physical Exam:  NAD, alert and oriented x 3, stable  OP clear, MMM, stable  PERRL, stable  Neck supple, stable  No LAD, stable  RRR, stable  CTAB, stable  +BS, ND, NT, soft, stable  No c/c/e, stable  No rashes, stable  PINO, stable  Normal affect    Results Reviewed:  Results from last 7 days   Lab Units 213 21  2133   WBC 10*3/mm3 5.88 9.63 9.49   HEMOGLOBIN g/dL 12.9 11.9* 12.3   HEMATOCRIT % 36.4 32.4* 33.3*   PLATELETS 10*3/mm3 235 258 268     Results from last 7 days   Lab Units 21  03421  23521  2322 21  2133   SODIUM mmol/L 131* 130* 130* 123* 107* 107*   POTASSIUM mmol/L  --   --   --  4.1 3.5 3.5   CHLORIDE mmol/L  --   --   --  90* 74* 73*   CO2 mmol/L  --   --   --  25.0 23.0 22.0   BUN mg/dL  --   --   --  8 8 8   CREATININE mg/dL  --   --   --  0.82 0.59 0.62   GLUCOSE mg/dL  --   --   --  98 113* 114*   CALCIUM mg/dL  --   --   --  8.5* 9.0 9.3   ALT (SGPT) U/L  --   --   --    --   --  <5   AST (SGOT) U/L  --   --   --   --   --  42*     Estimated Creatinine Clearance: 74.2 mL/min (by C-G formula based on SCr of 0.82 mg/dL).    Microbiology Results Abnormal     Procedure Component Value - Date/Time    COVID PRE-OP / PRE-PROCEDURE SCREENING ORDER (NO ISOLATION) - Swab, Nasopharynx [[id number]]  (Normal) Collected: 03/04/21 0417    Lab Status: Final result Specimen: Swab from Nasopharynx Updated: 03/04/21 0813    Narrative:      The following orders were created for panel order COVID PRE-OP / PRE-PROCEDURE SCREENING ORDER (NO ISOLATION) - Swab, Nasopharynx.  Procedure                               Abnormality         Status                     ---------                               -----------         ------                     COVID-19 and FLU A/B PCR...[[id number]]  Normal              Final result                 Please view results for these tests on the individual orders.    COVID-19 and FLU A/B PCR - Swab, Nasopharynx [[id number]]  (Normal) Collected: 03/04/21 0417    Lab Status: Final result Specimen: Swab from Nasopharynx Updated: 03/04/21 0813     COVID19 Not Detected     Influenza A PCR Not Detected     Influenza B PCR Not Detected    Narrative:      Fact sheet for providers: https://www.fda.gov/media/314448/download    Fact sheet for patients: https://www.fda.gov/media/756994/download    Test performed by PCR.          Imaging Results (Last 24 Hours)     ** No results found for the last 24 hours. **              I have reviewed the medications:  Scheduled Meds:carbidopa-levodopa, 1.5 tablet, Oral, TID  enoxaparin, 40 mg, Subcutaneous, Q24H  famotidine, 20 mg, Oral, BID AC  levothyroxine, 75 mcg, Oral, Q AM  rOPINIRole, 0.25 mg, Oral, Q12H  sodium chloride, 10 mL, Intravenous, Q12H      Continuous Infusions:dextrose, 25 mL/hr, Last Rate: 25 mL/hr (03/07/21 0941)      PRN Meds:.•  acetaminophen  •  calcium carbonate  •  influenza vaccine  •  magnesium sulfate **OR** magnesium  sulfate **OR** magnesium sulfate  •  potassium chloride **OR** potassium chloride **OR** potassium chloride  •  sodium chloride    Assessment/Plan   Assessment & Plan     Active Hospital Problems    Diagnosis  POA   • **Hyponatremia [E87.1]  Yes   • Hx of breast cancer [Z85.3]  Not Applicable   • Depression [F32.9]  Unknown   • Hypothyroidism [E03.9]  Unknown   • Parkinsonism (CMS/HCC) [G20]  Unknown   • History of migraine headaches [Z86.69]  Not Applicable      Resolved Hospital Problems   No resolved problems to display.        Brief Hospital Course to date:  Carolina Duarte is a 60 y.o. female with history of depression, breast cancer, hypothyroidism, Parkinsonism and migraines with symptomatic hyponatremia.    Hyponatremia  --improved, remains on free water, decrease rate and observe, slowly trending to normal    Dysphagia  --notes HH/reflux, start pepcid/tums  --speech evaluating, MBS pending    Hypothyroidism  --synthroid    Restless leg  --from parkinsonism?, added requip    Mobilize    DVT Prophylaxis:  Lovenox      Disposition: I expect the patient to be discharged TBD.    CODE STATUS:   Code Status and Medical Interventions:   Ordered at: 03/04/21 0238     Code Status:    CPR     Medical Interventions (Level of Support Prior to Arrest):    Full       Job Reinoso MD  03/07/21

## 2021-03-07 NOTE — PLAN OF CARE
Goal Outcome Evaluation:        VSS. Adequate urine output during shift. Pt rested comfortably during shift. No complaints over night.

## 2021-03-07 NOTE — PLAN OF CARE
Problem: Adult Inpatient Plan of Care  Goal: Plan of Care Review  Outcome: Ongoing, Progressing  Goal: Patient-Specific Goal (Individualized)  Outcome: Ongoing, Progressing  Goal: Absence of Hospital-Acquired Illness or Injury  Outcome: Ongoing, Progressing  Intervention: Identify and Manage Fall Risk  Recent Flowsheet Documentation  Taken 3/7/2021 1200 by Maria Fernanda Marina RN  Safety Promotion/Fall Prevention:   activity supervised   fall prevention program maintained   nonskid shoes/slippers when out of bed   safety round/check completed   room organization consistent  Taken 3/7/2021 1000 by Maria Fernanda Marina RN  Safety Promotion/Fall Prevention:   activity supervised   fall prevention program maintained   room organization consistent   safety round/check completed   nonskid shoes/slippers when out of bed  Taken 3/7/2021 0800 by Maria Fernanda Marina RN  Safety Promotion/Fall Prevention:   activity supervised   fall prevention program maintained   nonskid shoes/slippers when out of bed   safety round/check completed   room organization consistent  Intervention: Prevent and Manage VTE (venous thromboembolism) Risk  Recent Flowsheet Documentation  Taken 3/7/2021 1200 by Maria Fernanda Marina RN  VTE Prevention/Management: (Lovenox)   bleeding risk factor(s) identified   other (see comments)  Taken 3/7/2021 1000 by Maria Fernanda Marina RN  VTE Prevention/Management: (Lovenox)   bleeding risk factor(s) identified   other (see comments)  Taken 3/7/2021 0800 by Maria Fernanda Marina RN  VTE Prevention/Management: (Lovenox) bleeding risk factor(s) identified  Goal: Optimal Comfort and Wellbeing  Outcome: Ongoing, Progressing  Intervention: Provide Person-Centered Care  Recent Flowsheet Documentation  Taken 3/7/2021 0800 by Maria Fernanda Marina RN  Trust Relationship/Rapport:   care explained   choices provided   emotional support provided   empathic listening provided   questions answered   questions encouraged   reassurance provided    thoughts/feelings acknowledged  Goal: Readiness for Transition of Care  Outcome: Ongoing, Progressing     Problem: Fall Injury Risk  Goal: Absence of Fall and Fall-Related Injury  Outcome: Ongoing, Progressing  Intervention: Identify and Manage Contributors to Fall Injury Risk  Recent Flowsheet Documentation  Taken 3/7/2021 0800 by Maria Fernanda Mraina, RN  Medication Review/Management: medications reviewed  Intervention: Promote Injury-Free Environment  Recent Flowsheet Documentation  Taken 3/7/2021 1200 by Maria Fernanda Marina, RN  Safety Promotion/Fall Prevention:   activity supervised   fall prevention program maintained   nonskid shoes/slippers when out of bed   safety round/check completed   room organization consistent  Taken 3/7/2021 1000 by Maria Fernanda Marina RN  Safety Promotion/Fall Prevention:   activity supervised   fall prevention program maintained   room organization consistent   safety round/check completed   nonskid shoes/slippers when out of bed  Taken 3/7/2021 0800 by Maria Fernanda Marina, RN  Safety Promotion/Fall Prevention:   activity supervised   fall prevention program maintained   nonskid shoes/slippers when out of bed   safety round/check completed   room organization consistent     Problem: Skin Injury Risk Increased  Goal: Skin Health and Integrity  Outcome: Ongoing, Progressing  Intervention: Optimize Skin Protection  Recent Flowsheet Documentation  Taken 3/7/2021 1200 by Maria Fernanda Marina, RN  Pressure Reduction Techniques:   frequent weight shift encouraged   weight shift assistance provided  Pressure Reduction Devices:   pressure-redistributing mattress utilized   positioning supports utilized  Skin Protection:   adhesive use limited   incontinence pads utilized   tubing/devices free from skin contact  Taken 3/7/2021 1000 by Maria Fernanda Marina, RN  Pressure Reduction Techniques:   frequent weight shift encouraged   weight shift assistance provided  Pressure Reduction Devices:    pressure-redistributing mattress utilized   positioning supports utilized  Skin Protection:   adhesive use limited   incontinence pads utilized   tubing/devices free from skin contact  Taken 3/7/2021 0800 by Maria Fernanda Marina RN  Pressure Reduction Techniques:   frequent weight shift encouraged   pressure points protected  Pressure Reduction Devices:   pressure-redistributing mattress utilized   positioning supports utilized  Skin Protection:   adhesive use limited   incontinence pads utilized   tubing/devices free from skin contact   Goal Outcome Evaluation:

## 2021-03-08 ENCOUNTER — APPOINTMENT (OUTPATIENT)
Dept: GENERAL RADIOLOGY | Facility: HOSPITAL | Age: 61
End: 2021-03-08

## 2021-03-08 LAB
SODIUM SERPL-SCNC: 129 MMOL/L (ref 136–145)
SODIUM SERPL-SCNC: 130 MMOL/L (ref 136–145)
SODIUM SERPL-SCNC: 131 MMOL/L (ref 136–145)

## 2021-03-08 PROCEDURE — 74230 X-RAY XM SWLNG FUNCJ C+: CPT

## 2021-03-08 PROCEDURE — 74240 X-RAY XM UPR GI TRC 1CNTRST: CPT

## 2021-03-08 PROCEDURE — 84295 ASSAY OF SERUM SODIUM: CPT | Performed by: INTERNAL MEDICINE

## 2021-03-08 PROCEDURE — 99232 SBSQ HOSP IP/OBS MODERATE 35: CPT | Performed by: HOSPITALIST

## 2021-03-08 PROCEDURE — 25010000002 ENOXAPARIN PER 10 MG: Performed by: INTERNAL MEDICINE

## 2021-03-08 PROCEDURE — 92611 MOTION FLUOROSCOPY/SWALLOW: CPT

## 2021-03-08 PROCEDURE — 84295 ASSAY OF SERUM SODIUM: CPT | Performed by: HOSPITALIST

## 2021-03-08 RX ORDER — DOCUSATE SODIUM 100 MG/1
100 CAPSULE, LIQUID FILLED ORAL 2 TIMES DAILY
Status: DISCONTINUED | OUTPATIENT
Start: 2021-03-08 | End: 2021-03-09 | Stop reason: ALTCHOICE

## 2021-03-08 RX ORDER — BISACODYL 5 MG/1
10 TABLET, DELAYED RELEASE ORAL DAILY PRN
Status: DISCONTINUED | OUTPATIENT
Start: 2021-03-08 | End: 2021-03-09 | Stop reason: HOSPADM

## 2021-03-08 RX ADMIN — DOCUSATE SODIUM 100 MG: 100 CAPSULE, LIQUID FILLED ORAL at 11:52

## 2021-03-08 RX ADMIN — FAMOTIDINE 20 MG: 20 TABLET, FILM COATED ORAL at 08:07

## 2021-03-08 RX ADMIN — BARIUM SULFATE 20 ML: 400 PASTE ORAL at 10:28

## 2021-03-08 RX ADMIN — DOCUSATE SODIUM 100 MG: 100 CAPSULE, LIQUID FILLED ORAL at 20:14

## 2021-03-08 RX ADMIN — CARBIDOPA AND LEVODOPA 1.5 TABLET: 25; 100 TABLET ORAL at 08:07

## 2021-03-08 RX ADMIN — FAMOTIDINE 20 MG: 20 TABLET, FILM COATED ORAL at 16:35

## 2021-03-08 RX ADMIN — BARIUM SULFATE 100 ML: 0.81 POWDER, FOR SUSPENSION ORAL at 10:29

## 2021-03-08 RX ADMIN — ENOXAPARIN SODIUM 40 MG: 40 INJECTION SUBCUTANEOUS at 08:06

## 2021-03-08 RX ADMIN — SODIUM CHLORIDE, PRESERVATIVE FREE 10 ML: 5 INJECTION INTRAVENOUS at 20:15

## 2021-03-08 RX ADMIN — LEVOTHYROXINE SODIUM 75 MCG: 75 TABLET ORAL at 05:52

## 2021-03-08 RX ADMIN — CARBIDOPA AND LEVODOPA 1.5 TABLET: 25; 100 TABLET ORAL at 20:14

## 2021-03-08 RX ADMIN — SODIUM CHLORIDE, PRESERVATIVE FREE 10 ML: 5 INJECTION INTRAVENOUS at 08:07

## 2021-03-08 RX ADMIN — ROPINIROLE HYDROCHLORIDE 0.25 MG: 0.5 TABLET, FILM COATED ORAL at 20:14

## 2021-03-08 RX ADMIN — CARBIDOPA AND LEVODOPA 1.5 TABLET: 25; 100 TABLET ORAL at 16:35

## 2021-03-08 RX ADMIN — ROPINIROLE HYDROCHLORIDE 0.25 MG: 0.5 TABLET, FILM COATED ORAL at 08:07

## 2021-03-08 NOTE — PROGRESS NOTES
Continued Stay Note   Mila     Patient Name: Carolina Duarte  MRN: [id number]  Today's Date: 3/8/2021    Admit Date: 3/3/2021    Discharge Plan     Row Name 03/08/21 1100       Plan    Plan Met with  Mrs. Duarte and she has a discharge plan to return to her home with her spouse when she is medically ready for discharge possibly Tuesday and case management will continue to follow for discharge needs.        Discharge Codes    No documentation.       Expected Discharge Date and Time     Expected Discharge Date Expected Discharge Time    Mar 7, 2021             ALESSANDRO Patton

## 2021-03-08 NOTE — PLAN OF CARE
Problem: Adult Inpatient Plan of Care  Goal: Plan of Care Review  Outcome: Ongoing, Not Progressing  Flowsheets (Taken 3/8/2021 0424)  Progress: no change  Plan of Care Reviewed With: patient  Goal: Patient-Specific Goal (Individualized)  Outcome: Ongoing, Not Progressing  Goal: Absence of Hospital-Acquired Illness or Injury  Outcome: Ongoing, Not Progressing  Intervention: Identify and Manage Fall Risk  Recent Flowsheet Documentation  Taken 3/8/2021 0201 by Linda Pa, RN  Safety Promotion/Fall Prevention:   activity supervised   assistive device/personal items within reach   clutter free environment maintained   fall prevention program maintained   nonskid shoes/slippers when out of bed   room organization consistent   safety round/check completed   toileting scheduled  Taken 3/8/2021 0000 by Linda Pa, RN  Safety Promotion/Fall Prevention:   activity supervised   assistive device/personal items within reach   clutter free environment maintained   fall prevention program maintained   nonskid shoes/slippers when out of bed   room organization consistent   safety round/check completed   toileting scheduled  Taken 3/7/2021 2200 by Linda Pa, RN  Safety Promotion/Fall Prevention:   activity supervised   assistive device/personal items within reach   clutter free environment maintained   fall prevention program maintained   nonskid shoes/slippers when out of bed   room organization consistent   safety round/check completed   toileting scheduled  Taken 3/7/2021 2000 by Linda Pa, RN  Safety Promotion/Fall Prevention:   activity supervised   assistive device/personal items within reach   clutter free environment maintained   fall prevention program maintained   nonskid shoes/slippers when out of bed   room organization consistent   safety round/check completed   toileting scheduled  Intervention: Prevent Skin Injury  Recent Flowsheet Documentation  Taken 3/8/2021 0201 by Linda Pa, MILEY  Body  Position: position changed independently  Taken 3/8/2021 0000 by Linda Pa RN  Body Position: position changed independently  Taken 3/7/2021 2200 by Linda Pa RN  Body Position: position changed independently  Taken 3/7/2021 2000 by Linda Pa RN  Body Position: position changed independently  Intervention: Prevent and Manage VTE (venous thromboembolism) Risk  Recent Flowsheet Documentation  Taken 3/7/2021 2000 by Linda Pa RN  VTE Prevention/Management: bleeding risk factor(s) identified  Intervention: Prevent Infection  Recent Flowsheet Documentation  Taken 3/8/2021 0201 by Linda Pa RN  Infection Prevention:   environmental surveillance performed   hand hygiene promoted   rest/sleep promoted   single patient room provided   visitors restricted/screened  Taken 3/8/2021 0000 by Linda Pa RN  Infection Prevention:   environmental surveillance performed   hand hygiene promoted   rest/sleep promoted   single patient room provided   visitors restricted/screened  Taken 3/7/2021 2200 by Linda Pa RN  Infection Prevention:   environmental surveillance performed   hand hygiene promoted   rest/sleep promoted   single patient room provided   visitors restricted/screened  Taken 3/7/2021 2000 by Linda Pa RN  Infection Prevention:   environmental surveillance performed   hand hygiene promoted   rest/sleep promoted   single patient room provided   visitors restricted/screened  Goal: Optimal Comfort and Wellbeing  Outcome: Ongoing, Not Progressing  Intervention: Provide Person-Centered Care  Recent Flowsheet Documentation  Taken 3/7/2021 2000 by Linda Pa RN  Trust Relationship/Rapport:   care explained   choices provided   emotional support provided   empathic listening provided   questions answered   questions encouraged   reassurance provided   thoughts/feelings acknowledged  Goal: Readiness for Transition of Care  Outcome: Ongoing, Not Progressing     Problem: Fall Injury  Risk  Goal: Absence of Fall and Fall-Related Injury  Outcome: Ongoing, Not Progressing  Intervention: Identify and Manage Contributors to Fall Injury Risk  Recent Flowsheet Documentation  Taken 3/8/2021 0201 by Linda Pa RN  Medication Review/Management: medications reviewed  Taken 3/8/2021 0000 by Linda Pa RN  Medication Review/Management: medications reviewed  Taken 3/7/2021 2200 by Linda Pa RN  Medication Review/Management: medications reviewed  Taken 3/7/2021 2000 by Linda Pa RN  Medication Review/Management: medications reviewed  Intervention: Promote Injury-Free Environment  Recent Flowsheet Documentation  Taken 3/8/2021 0201 by Linda Pa RN  Safety Promotion/Fall Prevention:   activity supervised   assistive device/personal items within reach   clutter free environment maintained   fall prevention program maintained   nonskid shoes/slippers when out of bed   room organization consistent   safety round/check completed   toileting scheduled  Taken 3/8/2021 0000 by Linda Pa RN  Safety Promotion/Fall Prevention:   activity supervised   assistive device/personal items within reach   clutter free environment maintained   fall prevention program maintained   nonskid shoes/slippers when out of bed   room organization consistent   safety round/check completed   toileting scheduled  Taken 3/7/2021 2200 by Linda Pa RN  Safety Promotion/Fall Prevention:   activity supervised   assistive device/personal items within reach   clutter free environment maintained   fall prevention program maintained   nonskid shoes/slippers when out of bed   room organization consistent   safety round/check completed   toileting scheduled  Taken 3/7/2021 2000 by Linda Pa RN  Safety Promotion/Fall Prevention:   activity supervised   assistive device/personal items within reach   clutter free environment maintained   fall prevention program maintained   nonskid shoes/slippers when out of  bed   room organization consistent   safety round/check completed   toileting scheduled     Problem: Electrolyte Imbalance  Goal: Electrolyte Balance  Outcome: Ongoing, Not Progressing  Intervention: Monitor and Manage Electrolyte Imbalance  Recent Flowsheet Documentation  Taken 3/7/2021 2000 by Linda Pa RN  Fluid/Electrolyte Management: fluids provided     Problem: Skin Injury Risk Increased  Goal: Skin Health and Integrity  Outcome: Ongoing, Not Progressing  Intervention: Optimize Skin Protection  Recent Flowsheet Documentation  Taken 3/8/2021 0201 by Linda Pa, MILEY  Pressure Reduction Techniques: frequent weight shift encouraged  Pressure Reduction Devices: pressure-redistributing mattress utilized  Skin Protection:   adhesive use limited   incontinence pads utilized   tubing/devices free from skin contact  Taken 3/8/2021 0000 by Linda Pa, MILEY  Pressure Reduction Techniques: frequent weight shift encouraged  Pressure Reduction Devices: pressure-redistributing mattress utilized  Skin Protection:   adhesive use limited   incontinence pads utilized   tubing/devices free from skin contact  Taken 3/7/2021 2200 by Linda Pa, MILEY  Pressure Reduction Techniques: frequent weight shift encouraged  Pressure Reduction Devices: pressure-redistributing mattress utilized  Skin Protection:   adhesive use limited   incontinence pads utilized   tubing/devices free from skin contact  Taken 3/7/2021 2000 by Linda Pa, MILEY  Pressure Reduction Techniques: frequent weight shift encouraged  Pressure Reduction Devices: pressure-redistributing mattress utilized  Skin Protection:   adhesive use limited   incontinence pads utilized   tubing/devices free from skin contact   Goal Outcome Evaluation:  Plan of Care Reviewed With: patient  Progress: no change

## 2021-03-08 NOTE — PROGRESS NOTES
"    [facility] Medicine Services  PROGRESS NOTE    Patient Name: Carolina Duarte  : 1960  MRN: [id number]    Date of Admission: 3/3/2021  Primary Care Physician: Provider, No Known    Subjective   Subjective     CC:  Hyponatremia    HPI:  Swallowing better. Notes constipation. Still feels like she's in a \"fog\".    Review of Systems   Constitutional: Positive for activity change and fatigue.   HENT: Negative for trouble swallowing.    Respiratory: Negative.    Cardiovascular: Negative.    Gastrointestinal: Positive for constipation.   Genitourinary: Negative.    Musculoskeletal: Negative.    Neurological: Negative.    Psychiatric/Behavioral: Positive for decreased concentration.   No change in ROS from 3/7 otherwise    Objective   Objective     Vital Signs:   Temp:  [97.9 °F (36.6 °C)-98.3 °F (36.8 °C)] 98.2 °F (36.8 °C)  Heart Rate:  [68-98] 69  Resp:  [18-20] 18  BP: (121-155)/(35-85) 155/85        Physical Exam:  NAD, alert and oriented x 3, stable  OP clear, MMM, stable  PERRL, stable  Neck supple, stable  No LAD, stable  RRR, stable  CTAB, stable  +BS, ND, NT, soft, stable  No c/c/e, stable  No rashes, stable  PINO, stable  Normal affect  No change in exam from 3/7, re-examined today    Results Reviewed:  Results from last 7 days   Lab Units 21  0413 21  2133   WBC 10*3/mm3 5.88 9.63 9.49   HEMOGLOBIN g/dL 12.9 11.9* 12.3   HEMATOCRIT % 36.4 32.4* 33.3*   PLATELETS 10*3/mm3 235 258 268     Results from last 7 days   Lab Units 21  0811 21  0440 21  0022 21  2322 21  2133   SODIUM mmol/L 130* 131* 129* 123* 107* 107*   POTASSIUM mmol/L  --   --   --  4.1 3.5 3.5   CHLORIDE mmol/L  --   --   --  90* 74* 73*   CO2 mmol/L  --   --   --  25.0 23.0 22.0   BUN mg/dL  --   --   --  8 8 8   CREATININE mg/dL  --   --   --  0.82 0.59 0.62   GLUCOSE mg/dL  --   --   --  98 113* 114*   CALCIUM mg/dL  --   --   --  8.5* 9.0 " 9.3   ALT (SGPT) U/L  --   --   --   --   --  <5   AST (SGOT) U/L  --   --   --   --   --  42*     Estimated Creatinine Clearance: 73.5 mL/min (by C-G formula based on SCr of 0.82 mg/dL).    Microbiology Results Abnormal     Procedure Component Value - Date/Time    COVID PRE-OP / PRE-PROCEDURE SCREENING ORDER (NO ISOLATION) - Swab, Nasopharynx [[id number]]  (Normal) Collected: 03/04/21 0417    Lab Status: Final result Specimen: Swab from Nasopharynx Updated: 03/04/21 0813    Narrative:      The following orders were created for panel order COVID PRE-OP / PRE-PROCEDURE SCREENING ORDER (NO ISOLATION) - Swab, Nasopharynx.  Procedure                               Abnormality         Status                     ---------                               -----------         ------                     COVID-19 and FLU A/B PCR...[[id number]]  Normal              Final result                 Please view results for these tests on the individual orders.    COVID-19 and FLU A/B PCR - Swab, Nasopharynx [[id number]]  (Normal) Collected: 03/04/21 0417    Lab Status: Final result Specimen: Swab from Nasopharynx Updated: 03/04/21 0813     COVID19 Not Detected     Influenza A PCR Not Detected     Influenza B PCR Not Detected    Narrative:      Fact sheet for providers: https://www.fda.gov/media/159540/download    Fact sheet for patients: https://www.fda.gov/media/195743/download    Test performed by PCR.          Imaging Results (Last 24 Hours)     Procedure Component Value Units Date/Time    FL Video Swallow With Speech Single Contrast [[id number]] Resulted: 03/08/21 1013     Updated: 03/08/21 1040    FL Limited Ugi For Mbs Reflux Single-Contrast [[id number]] Resulted: 03/08/21 1002     Updated: 03/08/21 1030              I have reviewed the medications:  Scheduled Meds:carbidopa-levodopa, 1.5 tablet, Oral, TID  docusate sodium, 100 mg, Oral, BID  enoxaparin, 40 mg, Subcutaneous, Q24H  famotidine, 20 mg, Oral, BID AC  levothyroxine, 75 mcg,  Oral, Q AM  rOPINIRole, 0.25 mg, Oral, Q12H  sodium chloride, 10 mL, Intravenous, Q12H      Continuous Infusions:   PRN Meds:.•  acetaminophen  •  bisacodyl  •  calcium carbonate  •  influenza vaccine  •  magnesium sulfate **OR** magnesium sulfate **OR** magnesium sulfate  •  potassium chloride **OR** potassium chloride **OR** potassium chloride  •  sodium chloride    Assessment/Plan   Assessment & Plan     Active Hospital Problems    Diagnosis  POA   • **Hyponatremia [E87.1]  Yes   • Hx of breast cancer [Z85.3]  Not Applicable   • Depression [F32.9]  Unknown   • Hypothyroidism [E03.9]  Unknown   • Parkinsonism (CMS/HCC) [G20]  Unknown   • History of migraine headaches [Z86.69]  Not Applicable      Resolved Hospital Problems   No resolved problems to display.        Brief Hospital Course to date:  Carolina Duarte is a 60 y.o. female with history of depression, breast cancer, hypothyroidism, Parkinsonism and migraines with symptomatic hyponatremia.    Hyponatremia  --stop free water and observe, home if stable in 24-48 hours    Dysphagia  --MBS complete, follow up dysphagia recs    Cognitive dysfunction  --suspected lingering effects from low sodium and should improve    Hypothyroidism  --synthroid    Restless leg  --from parkinsonism?, added requip, better    Mobilize    DVT Prophylaxis:  Lovenox      Disposition: I expect the patient to be discharged TBD.    CODE STATUS:   Code Status and Medical Interventions:   Ordered at: 03/04/21 0238     Code Status:    CPR     Medical Interventions (Level of Support Prior to Arrest):    Full       Job Reinoso MD  03/08/21

## 2021-03-08 NOTE — DISCHARGE INSTR - DIET
MBS/VFSS   3/8/2021  Reason for Referral  Patient was referred for a MBS to assess the efficiency of his/her swallow function, rule out aspiration and make recommendations regarding safe dietary consistencies, effective compensatory strategies, and safe eating environment.             Recommendations/Treatment  SLP Swallowing Diagnosis: functional oral phase, functional pharyngeal phase  Swallow Criteria for Skilled Therapeutic Interventions Met: no problems identified which require skilled intervention  Therapy Frequency (Swallow): evaluation only  SLP Diet Recommendation: regular textures, thin liquids  Recommended Precautions and Strategies: general aspiration precautions, reflux precautions  SLP Rec. for Method of Medication Administration: meds whole, with thin liquids, with pudding or applesauce, as tolerated  Anticipated Discharge Disposition (SLP): home    Instrumental Set-up  Utensils Used: spoon, cup, straw  Consistencies Trialed: thin liquids, pudding thick, regular textures    Oral Preparation/ Oral Phase  Oral Prep Phase: WFL  Oral Transit Phase: WFL  Oral Residue: WFL             Pharyngeal Phase  Initiation of Pharyngeal Swallow: WFL  Pharyngeal Phase: functional pharyngeal phase of swallowing  VFSS Summary: Grossly functional oropharyngeal swallow. No penetration/aspiration or significant pharyngeal residue w/ any consistency tested. Per Radiologist, limited upper GI unremarkable. Pt safe to continue regular diet, thin liquids. Standard aspiration prctns. SLP will sign off.    Cervical Esophageal Phase  Esophageal Phase: no impairments, see radiology report for further details

## 2021-03-08 NOTE — PLAN OF CARE
Problem: Adult Inpatient Plan of Care  Goal: Plan of Care Review  Outcome: Ongoing, Progressing  Goal: Patient-Specific Goal (Individualized)  Outcome: Ongoing, Progressing  Goal: Absence of Hospital-Acquired Illness or Injury  Outcome: Ongoing, Progressing  Intervention: Identify and Manage Fall Risk  Recent Flowsheet Documentation  Taken 3/8/2021 1157 by Maria Fernanda Marina RN  Safety Promotion/Fall Prevention:   activity supervised   fall prevention program maintained   nonskid shoes/slippers when out of bed   room organization consistent   safety round/check completed  Taken 3/8/2021 0959 by Maria Fernanda Marina RN  Safety Promotion/Fall Prevention:   activity supervised   fall prevention program maintained   nonskid shoes/slippers when out of bed   room organization consistent   safety round/check completed  Taken 3/8/2021 0800 by Maria Fernanda Marina RN  Safety Promotion/Fall Prevention:   activity supervised   fall prevention program maintained   nonskid shoes/slippers when out of bed   room organization consistent   safety round/check completed  Intervention: Prevent and Manage VTE (venous thromboembolism) Risk  Recent Flowsheet Documentation  Taken 3/8/2021 1157 by Maria Fernanda Marina RN  VTE Prevention/Management: (Lovenox)   bleeding risk factor(s) identified   other (see comments)  Taken 3/8/2021 0959 by Maria Fernanda Marina RN  VTE Prevention/Management: (Lovenox)   bleeding risk factor(s) identified   other (see comments)  Taken 3/8/2021 0800 by Maria Fernanda Marina RN  VTE Prevention/Management: (Lovenox)   bleeding risk factor(s) identified   other (see comments)  Goal: Optimal Comfort and Wellbeing  Outcome: Ongoing, Progressing  Intervention: Provide Person-Centered Care  Recent Flowsheet Documentation  Taken 3/8/2021 0800 by Maria Fernanda Marina RN  Trust Relationship/Rapport:   care explained   choices provided   emotional support provided   empathic listening provided   questions answered   questions  encouraged   reassurance provided   thoughts/feelings acknowledged  Goal: Readiness for Transition of Care  Outcome: Ongoing, Progressing     Problem: Fall Injury Risk  Goal: Absence of Fall and Fall-Related Injury  Outcome: Ongoing, Progressing  Intervention: Identify and Manage Contributors to Fall Injury Risk  Recent Flowsheet Documentation  Taken 3/8/2021 0800 by Maria Fernanda Marina, RN  Medication Review/Management: medications reviewed  Intervention: Promote Injury-Free Environment  Recent Flowsheet Documentation  Taken 3/8/2021 1157 by Maria Fernanda Marina, RN  Safety Promotion/Fall Prevention:   activity supervised   fall prevention program maintained   nonskid shoes/slippers when out of bed   room organization consistent   safety round/check completed  Taken 3/8/2021 0959 by Maria Fernanda Marina, RN  Safety Promotion/Fall Prevention:   activity supervised   fall prevention program maintained   nonskid shoes/slippers when out of bed   room organization consistent   safety round/check completed  Taken 3/8/2021 0800 by Maria Fernanda Marina, RN  Safety Promotion/Fall Prevention:   activity supervised   fall prevention program maintained   nonskid shoes/slippers when out of bed   room organization consistent   safety round/check completed     Problem: Skin Injury Risk Increased  Goal: Skin Health and Integrity  Outcome: Ongoing, Progressing  Intervention: Optimize Skin Protection  Recent Flowsheet Documentation  Taken 3/8/2021 1157 by Maria Fernanda Marina, RN  Pressure Reduction Techniques:   frequent weight shift encouraged   weight shift assistance provided  Pressure Reduction Devices: pressure-redistributing mattress utilized  Skin Protection:   adhesive use limited   incontinence pads utilized   tubing/devices free from skin contact  Taken 3/8/2021 0959 by Maria Fernanda Marina, RN  Pressure Reduction Techniques:   frequent weight shift encouraged   weight shift assistance provided  Pressure Reduction Devices:  pressure-redistributing mattress utilized  Skin Protection:   adhesive use limited   incontinence pads utilized   tubing/devices free from skin contact  Taken 3/8/2021 0800 by Maria Fernanda Marina, RN  Pressure Reduction Techniques:   frequent weight shift encouraged   weight shift assistance provided  Pressure Reduction Devices: pressure-redistributing mattress utilized  Skin Protection:   adhesive use limited   incontinence pads utilized   tubing/devices free from skin contact   Goal Outcome Evaluation:

## 2021-03-08 NOTE — MBS/VFSS/FEES
Acute Care - Speech Language Pathology   Swallow Initial Evaluation  [facility]   Modified Barium Swallow Study (MBS)     Patient Name: Carolina Duarte  : 1960  MRN: [id number]  Today's Date: 3/8/2021               Admit Date: 3/3/2021    Visit Dx:     ICD-10-CM ICD-9-CM   1. Hyponatremia  E87.1 276.1   2. General weakness  R53.1 780.79   3. Dysphagia, unspecified type  R13.10 787.20     Patient Active Problem List   Diagnosis   • Hyponatremia   • Hx of breast cancer   • Depression   • Hypothyroidism   • Parkinsonism (CMS/HCC)   • History of migraine headaches     History reviewed. No pertinent past medical history.  History reviewed. No pertinent surgical history.     SWALLOW EVALUATION (last 72 hours)      SLP Adult Swallow Evaluation     Row Name 21 1012 21 1015                Rehab Evaluation    Document Type  evaluation  -MP  evaluation  -       Subjective Information  no complaints  -MP  --       Patient Observations  alert;cooperative  -MP  alert;cooperative  -SM       Patient/Family/Caregiver Comments/Observations  Spouse present  -MP  --       Care Plan Review  evaluation/treatment results reviewed;patient/other agree to care plan  -MP  evaluation/treatment results reviewed;patient/other agree to care plan  -SM       Care Plan Review, Other Participant(s)  spouse  -MP  significant other  -SM       Patient Effort  good  -MP  --          General Information    Patient Profile Reviewed  yes  -MP  yes  -SM       Pertinent History Of Current Problem  See previous eval.  -MP  Parkinsons, hyponatremia, noted reflux and difficulty swallowing. MD started on pepcid/tums and if did not improve, recommended SLP consult. Pt complaints of difficulty increased to nursing, SLP consulted.   -SM       Current Method of Nutrition  regular textures;thin liquids  -MP  regular textures;thin liquids  -SM       Precautions/Limitations, Vision  WFL with corrective lenses  -MP  --        "Precautions/Limitations, Hearing  WFL;for purposes of eval  -MP  --       Prior Level of Function-Communication  unknown  -MP  cognitive-linguistic impairment;other (see comments) word finding and memory difficulties  -SM       Prior Level of Function-Swallowing  esophageal concerns;other (see comments) known HH & reflux  -MP  esophageal concerns;other (see comments) hiatal hernia. Acutely worsened recently  -SM       Plans/Goals Discussed with  patient;spouse/S.O.;agreed upon  -MP  patient;spouse/S.O.;agreed upon  -SM       Barriers to Rehab  none identified  -MP  none identified  -SM       Patient's Goals for Discharge  patient did not state  -MP  eat/drink without coughing/choking  -SM          Pain    Additional Documentation  Pain Scale: FACES Pre/Post-Treatment (Group)  -MP  Pain Scale: Numbers Pre/Post-Treatment (Group)  -SM          Pain Scale: Numbers Pre/Post-Treatment    Pretreatment Pain Rating  --  0/10 - no pain  -SM       Posttreatment Pain Rating  --  0/10 - no pain  -SM          Pain Scale: FACES Pre/Post-Treatment    Pain: FACES Scale, Pretreatment  0-->no hurt  -MP  --       Posttreatment Pain Rating  0-->no hurt  -MP  --          Oral Motor Structure and Function    Dentition Assessment  natural, present and adequate  -MP  --       Secretion Management  WNL/WFL  -MP  --       Mucosal Quality  moist, healthy  -MP  --          Clinical Swallow Eval    Oral Prep Phase  --  WFL  -SM       Oral Transit  --  WFL  -SM       Oral Residue  --  WFL  -SM       Pharyngeal Phase  --  suspected pharyngeal impairment  -SM       Esophageal Phase  --  suspected esophageal impairment  -SM       Clinical Swallow Evaluation Summary  --  Pt c/o \"sticking\" and discomfort. Worse with liquid wash following regular solid trial. Discussed giving Pepcid/Tums some time to alleviate. Pt worried with swallow, especially given PD. Would like to pursue instrumental swallow to further assess. SLP will arrange for Monday 3/8.   " -          MBS/VFSS    Utensils Used  spoon;cup;straw  -MP  --       Consistencies Trialed  thin liquids;pudding thick;regular textures  -MP  --          MBS/VFSS Interpretation    Oral Prep Phase  WFL  -MP  --       Oral Transit Phase  WFL  -MP  --       Oral Residue  WFL  -MP  --       VFSS Summary  Grossly functional oropharyngeal swallow. No penetration/aspiration or significant pharyngeal residue w/ any consistency tested. Per Radiologist, limited upper GI unremarkable. Pt safe to continue regular diet, thin liquids. Standard aspiration prctns. SLP will sign off.  -MP  --          Initiation of Pharyngeal Swallow    Initiation of Pharyngeal Swallow  WFL  -MP  --       Pharyngeal Phase  functional pharyngeal phase of swallowing  -MP  --          Esophageal Phase    Esophageal Phase  no impairments;see radiology report for further details  -MP  --          Clinical Impression    SLP Swallowing Diagnosis  functional oral phase;functional pharyngeal phase  -  other (see comments) suspected pharyngoesophageal dysphagia  -       Swallow Criteria for Skilled Therapeutic Interventions Met  no problems identified which require skilled intervention  -MP  demonstrates skilled criteria  -          Recommendations    Therapy Frequency (Swallow)  evaluation only  -  --       SLP Diet Recommendation  regular textures;thin liquids  -  regular textures;thin liquids  -       Recommended Diagnostics  --  VFSS (MBS);other (see comments) + limited UGI, will arrange for 3/8  -       Recommended Precautions and Strategies  general aspiration precautions;reflux precautions  -  general aspiration precautions;reflux precautions  -       Oral Care Recommendations  Oral Care BID/PRN  -  Oral Care BID/PRN  -       SLP Rec. for Method of Medication Administration  meds whole;with thin liquids;with pudding or applesauce;as tolerated  -  meds whole;with pudding or applesauce  -       Anticipated Discharge  Disposition (SLP)  home  -  --         User Key  (r) = Recorded By, (t) = Taken By, (c) = Cosigned By    Initials Name Effective Dates    Velvet Rubio, MS CCC-SLP 08/09/20 -     Kj Ceja MS CCC-SLP 06/19/19 -           EDUCATION  The patient has been educated in the following areas:   Dysphagia (Swallowing Impairment).    SLP Recommendation and Plan  SLP Swallowing Diagnosis: functional oral phase, functional pharyngeal phase  SLP Diet Recommendation: regular textures, thin liquids  Recommended Precautions and Strategies: general aspiration precautions, reflux precautions  SLP Rec. for Method of Medication Administration: meds whole, with thin liquids, with pudding or applesauce, as tolerated           Swallow Criteria for Skilled Therapeutic Interventions Met: no problems identified which require skilled intervention  Anticipated Discharge Disposition (SLP): home     Therapy Frequency (Swallow): evaluation only                            Plan of Care Reviewed With: patient, spouse           Time Calculation:   Time Calculation- SLP     Row Name 03/08/21 1101             Time Calculation- SLP    SLP Start Time  1012  -MP      SLP Received On  03/08/21  -        User Key  (r) = Recorded By, (t) = Taken By, (c) = Cosigned By    Initials Name Provider Type    Kj Ceja MS CCC-SLP Speech and Language Pathologist          Therapy Charges for Today     Code Description Service Date Service Provider Modifiers Qty    [id number] HC ST MOTION FLUORO EVAL SWALLOW 4 3/8/2021 Kj Cruz MS CCC-SLP GN 1          Patient was not wearing a face mask and did not exhibit coughing during this therapy encounter.  Procedure performed was aerosolizing, involved close contact (within 6 feet for at least 15 minutes or longer), and did not involve contact with infectious secretions or specimens.  Therapist used appropriate personal protective equipment including gloves, standard procedure mask  and eye protection.  Appropriate PPE was worn during the entire therapy session.  Hand hygiene was completed before and after therapy session.        Kj Cruz MS CCC-SLP  3/8/2021

## 2021-03-08 NOTE — PLAN OF CARE
Goal Outcome Evaluation:  Plan of Care Reviewed With: patient, spouse        SLP evaluation completed. Will sign-off. Please see note for further details and recommendations.

## 2021-03-09 VITALS
RESPIRATION RATE: 16 BRPM | WEIGHT: 159.1 LBS | BODY MASS INDEX: 25.57 KG/M2 | HEART RATE: 90 BPM | HEIGHT: 66 IN | OXYGEN SATURATION: 97 % | TEMPERATURE: 97.9 F | SYSTOLIC BLOOD PRESSURE: 121 MMHG | DIASTOLIC BLOOD PRESSURE: 71 MMHG

## 2021-03-09 PROBLEM — E87.1 HYPONATREMIA: Status: RESOLVED | Noted: 2021-03-04 | Resolved: 2021-03-09

## 2021-03-09 LAB
SODIUM SERPL-SCNC: 130 MMOL/L (ref 136–145)
SODIUM SERPL-SCNC: 130 MMOL/L (ref 136–145)
SODIUM SERPL-SCNC: 132 MMOL/L (ref 136–145)

## 2021-03-09 PROCEDURE — 25010000002 ENOXAPARIN PER 10 MG: Performed by: INTERNAL MEDICINE

## 2021-03-09 PROCEDURE — 99239 HOSP IP/OBS DSCHRG MGMT >30: CPT | Performed by: FAMILY MEDICINE

## 2021-03-09 PROCEDURE — 84295 ASSAY OF SERUM SODIUM: CPT | Performed by: HOSPITALIST

## 2021-03-09 RX ORDER — BISACODYL 5 MG/1
10 TABLET, DELAYED RELEASE ORAL DAILY PRN
Start: 2021-03-09 | End: 2023-02-02

## 2021-03-09 RX ORDER — POLYETHYLENE GLYCOL 3350 17 G/17G
17 POWDER, FOR SOLUTION ORAL DAILY
Start: 2021-03-10 | End: 2023-02-02

## 2021-03-09 RX ORDER — AMOXICILLIN 250 MG
2 CAPSULE ORAL 2 TIMES DAILY
Start: 2021-03-09 | End: 2023-02-02

## 2021-03-09 RX ORDER — ESCITALOPRAM OXALATE 10 MG/1
10 TABLET ORAL DAILY
Status: DISCONTINUED | OUTPATIENT
Start: 2021-03-09 | End: 2021-03-09 | Stop reason: HOSPADM

## 2021-03-09 RX ORDER — POLYETHYLENE GLYCOL 3350 17 G/17G
17 POWDER, FOR SOLUTION ORAL DAILY
Status: DISCONTINUED | OUTPATIENT
Start: 2021-03-09 | End: 2021-03-09 | Stop reason: HOSPADM

## 2021-03-09 RX ORDER — BISACODYL 10 MG
10 SUPPOSITORY, RECTAL RECTAL DAILY PRN
Status: DISCONTINUED | OUTPATIENT
Start: 2021-03-09 | End: 2021-03-09 | Stop reason: HOSPADM

## 2021-03-09 RX ORDER — AMOXICILLIN 250 MG
2 CAPSULE ORAL 2 TIMES DAILY
Status: DISCONTINUED | OUTPATIENT
Start: 2021-03-09 | End: 2021-03-09 | Stop reason: HOSPADM

## 2021-03-09 RX ADMIN — DOCUSATE SODIUM 50MG AND SENNOSIDES 8.6MG 2 TABLET: 8.6; 5 TABLET, FILM COATED ORAL at 11:47

## 2021-03-09 RX ADMIN — LEVOTHYROXINE SODIUM 75 MCG: 75 TABLET ORAL at 05:43

## 2021-03-09 RX ADMIN — ESCITALOPRAM OXALATE 10 MG: 10 TABLET ORAL at 11:48

## 2021-03-09 RX ADMIN — CARBIDOPA AND LEVODOPA 1.5 TABLET: 25; 100 TABLET ORAL at 09:19

## 2021-03-09 RX ADMIN — FAMOTIDINE 20 MG: 20 TABLET, FILM COATED ORAL at 09:18

## 2021-03-09 RX ADMIN — ENOXAPARIN SODIUM 40 MG: 40 INJECTION SUBCUTANEOUS at 09:19

## 2021-03-09 RX ADMIN — POLYETHYLENE GLYCOL 3350 17 G: 17 POWDER, FOR SOLUTION ORAL at 11:47

## 2021-03-09 RX ADMIN — DOCUSATE SODIUM 100 MG: 100 CAPSULE, LIQUID FILLED ORAL at 09:19

## 2021-03-09 RX ADMIN — ROPINIROLE HYDROCHLORIDE 0.25 MG: 0.5 TABLET, FILM COATED ORAL at 09:19

## 2021-03-09 RX ADMIN — BISACODYL 10 MG: 10 SUPPOSITORY RECTAL at 11:48

## 2021-03-09 NOTE — DISCHARGE SUMMARY
[facility] Medicine Services  DISCHARGE SUMMARY    Patient Name: Carolina Duarte  : 1960  MRN: [id number]    Date of Admission: 3/3/2021 10:12 PM  Date of Discharge:  21    Primary Care Physician: Provider, No Known      Hospital Course     Presenting Problem:   Hyponatremia [E87.1]    Active Hospital Problems    Diagnosis  POA   • Hx of breast cancer [Z85.3]  Not Applicable   • Depression [F32.9]  Yes   • Hypothyroidism [E03.9]  Yes   • Parkinsonism (CMS/HCC) [G20]  Yes   • History of migraine headaches [Z86.69]  Not Applicable      Resolved Hospital Problems    Diagnosis Date Resolved POA   • **Hyponatremia [E87.1] 2021 Yes          Hospital Course:  Carolina Duarte is a 60 y.o. female with history of depression, breast cancer, hypothyroidism, Parkinsonism and migraines with symptomatic hyponatremia.     Hyponatremia  --secondary to osmotic colonic losses from Fleet enema use for chronic constipation  --resolved with slow free water replacement  --stable > 48hrs, patient eating as usual and self-hydrating     Evaluated for dyphagia based on complaint (in context of Parkinson's)  --episodes likely due to HH and GERD  --no diet modifications per SLP eval           Discharge Follow Up Recommendations for outpatient labs/diagnostics:  -- PCP within 1 week -- repeat BMP        Day of Discharge     HPI:   No diarrhea.  Discussed home daily bowel regimen to avoid chronic constipation -- Miralax, Senokot, prn dulcolax.      Vital Signs:   Temp:  [97.9 °F (36.6 °C)-98.7 °F (37.1 °C)] 97.9 °F (36.6 °C)  Heart Rate:  [67-90] 90  Resp:  [16-18] 16  BP: (120-138)/(71-85) 121/71     Physical Exam:  Constitutional: No acute distress, awake, alert, nontoxic, normal body habitus  Respiratory: Clear to auscultation bilaterally, good effort, nonlabored respirations   Cardiovascular: RRR, no murmur  Gastrointestinal: Positive bowel sounds, soft, nontender, nondistended  Psychiatric:  Appropriate affect, good insight and judgement, cooperative      Pertinent  and/or Most Recent Results     LAB RESULTS:      Lab 03/05/21 0447 03/04/21 0413 03/03/21 2133   WBC 5.88 9.63 9.49   HEMOGLOBIN 12.9 11.9* 12.3   HEMATOCRIT 36.4 32.4* 33.3*   PLATELETS 235 258 268   NEUTROS ABS 3.44 7.53* 7.61*   IMMATURE GRANS (ABS) 0.01 0.03 0.02   LYMPHS ABS 1.47 1.15 1.09   MONOS ABS 0.95* 0.92* 0.76   EOS ABS 0.00 0.00 0.00   MCV 88.8 85.3 85.6   LACTATE  --   --  1.1         Lab 03/09/21  0504 03/09/21  0023 03/08/21  1738 03/08/21  1204 03/08/21  0811 03/05/21 0447 03/04/21 0413 03/03/21 2322 03/03/21 2133   SODIUM 130* 130* 129* 129* 130* 123* 111* 107* 107*   POTASSIUM  --   --   --   --   --  4.1  --  3.5 3.5   CHLORIDE  --   --   --   --   --  90*  --  74* 73*   CO2  --   --   --   --   --  25.0  --  23.0 22.0   ANION GAP  --   --   --   --   --  8.0  --  10.0 12.0   BUN  --   --   --   --   --  8  --  8 8   CREATININE  --   --   --   --   --  0.82  --  0.59 0.62   GLUCOSE  --   --   --   --   --  98  --  113* 114*   CALCIUM  --   --   --   --   --  8.5*  --  9.0 9.3   MAGNESIUM  --   --   --   --   --  2.6* 1.5*  --   --    PHOSPHORUS  --   --   --   --   --  2.6  --   --   --    TSH  --   --   --   --   --   --   --  1.260  --          Lab 03/03/21 2133   TOTAL PROTEIN 7.1   ALBUMIN 4.70   GLOBULIN 2.4   ALT (SGPT) <5   AST (SGOT) 42*   BILIRUBIN 1.0   ALK PHOS 55   LIPASE 17                     Brief Urine Lab Results  (Last result in the past 365 days)      Color   Clarity   Blood   Leuk Est   Nitrite   Protein   CREAT   Urine HCG        03/03/21 2253               Negative         Microbiology Results (last 10 days)     Procedure Component Value - Date/Time    COVID PRE-OP / PRE-PROCEDURE SCREENING ORDER (NO ISOLATION) - Swab, Nasopharynx [[id number]]  (Normal) Collected: 03/04/21 0417    Lab Status: Final result Specimen: Swab from Nasopharynx Updated: 03/04/21 0813    Narrative:      The following  orders were created for panel order COVID PRE-OP / PRE-PROCEDURE SCREENING ORDER (NO ISOLATION) - Swab, Nasopharynx.  Procedure                               Abnormality         Status                     ---------                               -----------         ------                     COVID-19 and FLU A/B PCR...[[id number]]  Normal              Final result                 Please view results for these tests on the individual orders.    COVID-19 and FLU A/B PCR - Swab, Nasopharynx [[id number]]  (Normal) Collected: 03/04/21 0417    Lab Status: Final result Specimen: Swab from Nasopharynx Updated: 03/04/21 0813     COVID19 Not Detected     Influenza A PCR Not Detected     Influenza B PCR Not Detected    Narrative:      Fact sheet for providers: https://www.fda.gov/media/170678/download    Fact sheet for patients: https://www.fda.gov/media/177839/download    Test performed by PCR.          CT Head Without Contrast    Result Date: 3/4/2021  CT HEAD, NONCONTRAST, 3/4/2021 HISTORY: 60-year-old female in the ED with acute mental status change and weakness. Hyponatremia is noted. Nausea and vomiting. TECHNIQUE: CT imaging of the head without IV contrast. Radiation dose reduction techniques included automated exposure control. Radiation audit for CT and nuclear cardiology exams in the last 12 months: 0. FINDINGS: The examination is negative. No evidence of intracranial hemorrhage, mass, mass effect, cerebral edema, extra-axial fluid collection, hydrocephalus or additional abnormality. Visualized upper paranasal sinuses and mastoid air spaces are clear.     Negative head CT examination. Signer Name: Efrain Rogers MD  Signed: 3/4/2021 1:31 AM  Workstation Name: [email or web address]  [facility]    CT Abdomen Pelvis With Contrast    Result Date: 3/4/2021  CT ABDOMEN AND PELVIS WITH CONTRAST, 3/4/2021 HISTORY: 60-year-old female in the ED with nausea and vomiting beginning this morning. Dehydration.  Significant hyponatremia is noted. TECHNIQUE: CT imaging of the abdomen and pelvis with IV contrast. Radiation dose reduction techniques included automated exposure control. Radiation audit for CT and nuclear cardiology exams in the last 12 months: 0. ABDOMEN FINDINGS: Small bowel and colon are normal in caliber and appearance. No evidence of bowel obstruction or active GI tract inflammation. There is no gastric distention. Tiny hiatal hernia and mild distal esophageal wall thickening are noted which may indicate mild distal esophagitis. The appendix is normal. Both kidneys are negative with no evidence of urinary obstruction or active renal inflammation. Normal caliber abdominal aorta. Normal adrenal glands. No gallbladder distention or bile duct dilatation. Liver, pancreas and spleen are normal in size and appearance. PELVIS FINDINGS: Uterus, adnexal regions, urinary bladder and rectum are within normal limits. No free pelvic fluid. No inguinal hernia. Lung base images show no active disease.     1.  No acute abnormality seen within the abdomen or pelvis. 2.  Tiny hiatal hernia and potential mild distal esophageal wall thickening which may indicate active esophagitis. Signer Name: Efrain Rogres MD  Signed: 3/4/2021 1:35 AM  Workstation Name: [email or web address]  [facility]    FL Video Swallow With Speech Single Contrast    Result Date: 3/8/2021  EXAMINATION: FL VIDEO SWALLOW W SPEECH SINGLE-CONTRAST-, FL LIMITED UGI FOR MBS REFLUX SINGLE-CONTRAST-  INDICATION: dysphagia; E87.1-Oqwz-eguynmydxj and hyponatremia; R53.1-Weakness; R13.10-Dysphagia, unspecified  TECHNIQUE: 1 minute 6 seconds of fluoroscopic time was used for this exam. 6 associated fluoroscopic loops were saved. The patient was evaluated in the seated lateral position while taking a variety of consistencies of barium by mouth under the direction of speech pathology.  COMPARISON: NONE  FINDINGS: 1 minute 6 seconds of fluoroscopy  provided for a modified barium swallow. Please see speech therapy report for full details and recommendations.  Additional fluoroscopic views of the GE junction demonstrated grossly normal motility with some moderate reflux to the level of the thoracic esophagus.      Fluoroscopy provided for a modified barium swallow. Please see speech therapy report for full details and recommendations.  Additional limited upper fluoroscopy demonstrates moderate gastroesophageal reflux.   This report was finalized on 3/8/2021 12:14 PM by Janes Freeman.      XR Chest 1 View    Result Date: 3/4/2021  EXAMINATION: XR CHEST 1 VW-  INDICATION: hyponateemia, history of breast cancer; E87.7-Kxer-zbeijeqpnd and hyponatremia; R53.1-Weakness  COMPARISON: NONE  FINDINGS: No focal airspace opacity. No pleural effusion or pneumothorax. Normal heart and mediastinal contours.      No evidence of acute disease in the chest.  This report was finalized on 3/4/2021 8:51 AM by Janes Freeman.      FL Limited Ugi For Mbs Reflux Single-Contrast    Result Date: 3/8/2021  EXAMINATION: FL VIDEO SWALLOW W SPEECH SINGLE-CONTRAST-, FL LIMITED UGI FOR MBS REFLUX SINGLE-CONTRAST-  INDICATION: dysphagia; E87.0-Dxjz-lkvovpwrxf and hyponatremia; R53.1-Weakness; R13.10-Dysphagia, unspecified  TECHNIQUE: 1 minute 6 seconds of fluoroscopic time was used for this exam. 6 associated fluoroscopic loops were saved. The patient was evaluated in the seated lateral position while taking a variety of consistencies of barium by mouth under the direction of speech pathology.  COMPARISON: NONE  FINDINGS: 1 minute 6 seconds of fluoroscopy provided for a modified barium swallow. Please see speech therapy report for full details and recommendations.  Additional fluoroscopic views of the GE junction demonstrated grossly normal motility with some moderate reflux to the level of the thoracic esophagus.      Fluoroscopy provided for a modified barium swallow. Please see speech  therapy report for full details and recommendations.  Additional limited upper fluoroscopy demonstrates moderate gastroesophageal reflux.   This report was finalized on 3/8/2021 12:14 PM by Janes Freeman.                        Discharge Details        Discharge Medications      New Medications      Instructions Start Date   bisacodyl 5 MG EC tablet  Commonly known as: DULCOLAX   10 mg, Oral, Daily PRN      polyethylene glycol 17 g packet  Commonly known as: MIRALAX   17 g, Oral, Daily   Start Date: March 10, 2021     sennosides-docusate 8.6-50 MG per tablet  Commonly known as: PERICOLACE   2 tablets, Oral, 2 Times Daily         Continue These Medications      Instructions Start Date   carbidopa-levodopa  MG per tablet  Commonly known as: SINEMET   1.5 tablets, Oral, 3 Times Daily      escitalopram 10 MG tablet  Commonly known as: LEXAPRO   10 mg, Oral, Daily      levothyroxine 75 MCG tablet  Commonly known as: SYNTHROID, LEVOTHROID   75 mcg, Oral, Daily      Linzess 145 MCG capsule capsule  Generic drug: linaclotide   145 mcg, Oral, Every Morning Before Breakfast      multivitamin with minerals tablet tablet   1 tablet, Oral, Daily      ondansetron 8 MG tablet  Commonly known as: ZOFRAN   Oral, Every 8 Hours PRN      vitamin B-12 100 MCG tablet  Commonly known as: CYANOCOBALAMIN   50 mcg, Oral, Daily             No Known Allergies      Discharge Disposition:  Home or Self Care    Diet:  Hospital:  Diet Order   Procedures   • Diet Regular          CODE STATUS:    Code Status and Medical Interventions:   Ordered at: 03/04/21 0238     Code Status:    CPR     Medical Interventions (Level of Support Prior to Arrest):    Full       No future appointments.    Additional Instructions for the Follow-ups that You Need to Schedule     Discharge Follow-up with PCP   As directed       Currently Documented PCP:    Provider, No Known    PCP Phone Number:    None     Follow Up Details: Dr. Cam Schmidt -- within 1 week  for repeat BMP to eval sodium, post-discharge follow up                     Sabi Cannon MD  03/09/21      Time Spent on Discharge:  I spent  40  minutes on this discharge activity which included: face-to-face encounter with the patient, reviewing the data in the system, coordination of the care with the nursing staff as well as consultants, documentation, and entering orders.

## 2021-03-09 NOTE — PROGRESS NOTES
Case Management Discharge Note      Final Note: RADHA spoke with patient and  at bedside about discharge planning. Denied any discharge needs at this time. Plan is home with family transporting.         Selected Continued Care - Admitted Since 3/3/2021     Destination    No services have been selected for the patient.              Durable Medical Equipment    No services have been selected for the patient.              Dialysis/Infusion    No services have been selected for the patient.              Home Medical Care    No services have been selected for the patient.              Therapy    No services have been selected for the patient.              Community Resources    No services have been selected for the patient.                       Final Discharge Disposition Code: 01 - home or self-careContinued Stay Note  [facility]     Patient Name: Carolina Duarte  MRN: [id number]  Today's Date: 3/9/2021    Admit Date: 3/3/2021    Discharge Plan     Row Name 03/09/21 1234       Plan    Plan  Home with family transporting    Plan Comments  RADHA spoke with patient and  at bedside about discharge planning. Denied any discharge needs at this time. Plan is home with family transporting.    Final Discharge Disposition Code  01 - home or self-care        Discharge Codes    No documentation.       Expected Discharge Date and Time     Expected Discharge Date Expected Discharge Time    Mar 9, 2021             ALESSANDRO Silverio (Kay)

## 2021-03-10 ENCOUNTER — READMISSION MANAGEMENT (OUTPATIENT)
Dept: CALL CENTER | Facility: HOSPITAL | Age: 61
End: 2021-03-10

## 2021-03-10 NOTE — OUTREACH NOTE
Prep Survey      Responses   [facility] facility patient discharged from?  [place]   Is LACE score < 7 ?  No   Emergency Room discharge w/ pulse ox?  No   Eligibility  Readm Mgmt   Discharge diagnosis  Hyponatremia    Does the patient have one of the following disease processes/diagnoses(primary or secondary)?  Other   Does the patient have Home health ordered?  No   Is there a DME ordered?  No   Prep survey completed?  Yes          Marta Reynolds RN

## 2021-03-11 ENCOUNTER — READMISSION MANAGEMENT (OUTPATIENT)
Dept: CALL CENTER | Facility: HOSPITAL | Age: 61
End: 2021-03-11

## 2021-03-11 NOTE — OUTREACH NOTE
Medical Week 1 Survey      Responses   [facility] patient discharged from?  [place]   Does the patient have one of the following disease processes/diagnoses(primary or secondary)?  Other   Week 1 attempt successful?  No   Unsuccessful attempts  Attempt 1          Selene Elizabeth RN

## 2021-03-12 ENCOUNTER — READMISSION MANAGEMENT (OUTPATIENT)
Dept: CALL CENTER | Facility: HOSPITAL | Age: 61
End: 2021-03-12

## 2021-03-12 NOTE — OUTREACH NOTE
Medical Week 1 Survey      Responses   [facility] patient discharged from?  [place]   Does the patient have one of the following disease processes/diagnoses(primary or secondary)?  Other   Week 1 attempt successful?  Yes   Call start time  1702   Call end time  1704   Discharge diagnosis  Hyponatremia    Meds reviewed with patient/caregiver?  Yes   Is the patient having any side effects they believe may be caused by any medication additions or changes?  No   Does the patient have all medications ordered at discharge?  Yes   Is the patient taking all medications as directed (includes completed medication regime)?  Yes   Does the patient have a primary care provider?   Yes   Does the patient have an appointment with their PCP within 7 days of discharge?  No   What is preventing the patient from scheduling follow up appointments within 7 days of discharge?  Haven't had time   Nursing Interventions  Advised patient to make appointment, Educated patient on importance of making appointment   Has the patient kept scheduled appointments due by today?  N/A   Has home health visited the patient within 72 hours of discharge?  N/A   Psychosocial issues?  No   Did the patient receive a copy of their discharge instructions?  Yes   Nursing interventions  Reviewed instructions with patient   What is the patient's perception of their health status since discharge?  Improving   Is the patient/caregiver able to teach back signs and symptoms related to disease process for when to call PCP?  Yes   Is the patient/caregiver able to teach back signs and symptoms related to disease process for when to call 911?  Yes   Is the patient/caregiver able to teach back the hierarchy of who to call/visit for symptoms/problems? PCP, Specialist, Home health nurse, Urgent Care, ED, 911  Yes   If the patient is a current smoker, are they able to teach back resources for cessation?  Not a smoker   Week 1 call completed?  Yes          [employer]  Andres RN

## 2021-11-16 ENCOUNTER — IMMUNIZATION (OUTPATIENT)
Dept: FAMILY MEDICINE CLINIC | Facility: CLINIC | Age: 61
End: 2021-11-16

## 2021-11-16 DIAGNOSIS — Z23 IMMUNIZATION DUE: Primary | ICD-10-CM

## 2021-11-16 PROCEDURE — 0004A COVID-19 (PFIZER): CPT | Performed by: FAMILY MEDICINE

## 2021-11-16 PROCEDURE — 0003A: CPT | Performed by: FAMILY MEDICINE

## 2021-11-16 PROCEDURE — 91300 COVID-19 (PFIZER): CPT | Performed by: FAMILY MEDICINE

## 2022-08-21 ENCOUNTER — APPOINTMENT (OUTPATIENT)
Dept: GENERAL RADIOLOGY | Facility: HOSPITAL | Age: 62
End: 2022-08-21

## 2022-08-21 ENCOUNTER — APPOINTMENT (OUTPATIENT)
Dept: CT IMAGING | Facility: HOSPITAL | Age: 62
End: 2022-08-21

## 2022-08-21 ENCOUNTER — HOSPITAL ENCOUNTER (EMERGENCY)
Facility: HOSPITAL | Age: 62
Discharge: HOME | End: 2022-08-22
Attending: EMERGENCY MEDICINE | Admitting: STUDENT IN AN ORGANIZED HEALTH CARE EDUCATION/TRAINING PROGRAM

## 2022-08-21 ENCOUNTER — APPOINTMENT (OUTPATIENT)
Dept: MRI IMAGING | Facility: HOSPITAL | Age: 62
End: 2022-08-21

## 2022-08-21 DIAGNOSIS — Z85.3 HISTORY OF BREAST CANCER: ICD-10-CM

## 2022-08-21 DIAGNOSIS — R19.7 DIARRHEA, UNSPECIFIED TYPE: ICD-10-CM

## 2022-08-21 DIAGNOSIS — Z86.39 HISTORY OF HYPOTHYROIDISM: ICD-10-CM

## 2022-08-21 DIAGNOSIS — U07.1 COVID-19 VIRUS INFECTION: Primary | ICD-10-CM

## 2022-08-21 DIAGNOSIS — R53.1 GENERALIZED WEAKNESS: ICD-10-CM

## 2022-08-21 DIAGNOSIS — R53.81 MALAISE AND FATIGUE: ICD-10-CM

## 2022-08-21 DIAGNOSIS — Z92.29 COVID-19 VACCINE SERIES COMPLETED: ICD-10-CM

## 2022-08-21 DIAGNOSIS — R41.89 BRAIN FOG: ICD-10-CM

## 2022-08-21 DIAGNOSIS — R52 GENERALIZED BODY ACHES: ICD-10-CM

## 2022-08-21 DIAGNOSIS — R53.83 MALAISE AND FATIGUE: ICD-10-CM

## 2022-08-21 DIAGNOSIS — Z86.69 HISTORY OF PARKINSON'S DISEASE: ICD-10-CM

## 2022-08-21 LAB
ALBUMIN SERPL-MCNC: 4.5 G/DL (ref 3.5–5.2)
ALBUMIN/GLOB SERPL: 1.8 G/DL
ALP SERPL-CCNC: 72 U/L (ref 39–117)
ALT SERPL W P-5'-P-CCNC: <5 U/L (ref 1–33)
ANION GAP SERPL CALCULATED.3IONS-SCNC: 9 MMOL/L (ref 5–15)
AST SERPL-CCNC: 27 U/L (ref 1–32)
BACTERIA UR QL AUTO: ABNORMAL /HPF
BASOPHILS # BLD AUTO: 0.02 10*3/MM3 (ref 0–0.2)
BASOPHILS NFR BLD AUTO: 0.3 % (ref 0–1.5)
BILIRUB SERPL-MCNC: 0.4 MG/DL (ref 0–1.2)
BILIRUB UR QL STRIP: NEGATIVE
BUN SERPL-MCNC: 11 MG/DL (ref 8–23)
BUN/CREAT SERPL: 14.1 (ref 7–25)
CALCIUM SPEC-SCNC: 9 MG/DL (ref 8.6–10.5)
CHLORIDE SERPL-SCNC: 96 MMOL/L (ref 98–107)
CLARITY UR: CLEAR
CO2 SERPL-SCNC: 27 MMOL/L (ref 22–29)
COLOR UR: YELLOW
CREAT SERPL-MCNC: 0.78 MG/DL (ref 0.57–1)
CRP SERPL-MCNC: 1.43 MG/DL (ref 0–0.5)
D-LACTATE SERPL-SCNC: 0.7 MMOL/L (ref 0.5–2)
DEPRECATED RDW RBC AUTO: 41.3 FL (ref 37–54)
EGFRCR SERPLBLD CKD-EPI 2021: 85.5 ML/MIN/1.73
EOSINOPHIL # BLD AUTO: 0.05 10*3/MM3 (ref 0–0.4)
EOSINOPHIL NFR BLD AUTO: 0.7 % (ref 0.3–6.2)
ERYTHROCYTE [DISTWIDTH] IN BLOOD BY AUTOMATED COUNT: 12.2 % (ref 12.3–15.4)
FLUAV SUBTYP SPEC NAA+PROBE: NOT DETECTED
FLUBV RNA ISLT QL NAA+PROBE: NOT DETECTED
GLOBULIN UR ELPH-MCNC: 2.5 GM/DL
GLUCOSE SERPL-MCNC: 88 MG/DL (ref 65–99)
GLUCOSE UR STRIP-MCNC: NEGATIVE MG/DL
HCT VFR BLD AUTO: 35.9 % (ref 34–46.6)
HGB BLD-MCNC: 12.4 G/DL (ref 12–15.9)
HGB UR QL STRIP.AUTO: NEGATIVE
HOLD SPECIMEN: NORMAL
HYALINE CASTS UR QL AUTO: ABNORMAL /LPF
IMM GRANULOCYTES # BLD AUTO: 0.01 10*3/MM3 (ref 0–0.05)
IMM GRANULOCYTES NFR BLD AUTO: 0.1 % (ref 0–0.5)
KETONES UR QL STRIP: NEGATIVE
LDH SERPL-CCNC: 200 U/L (ref 135–214)
LEUKOCYTE ESTERASE UR QL STRIP.AUTO: ABNORMAL
LYMPHOCYTES # BLD AUTO: 2.13 10*3/MM3 (ref 0.7–3.1)
LYMPHOCYTES NFR BLD AUTO: 29.7 % (ref 19.6–45.3)
MAGNESIUM SERPL-MCNC: 2 MG/DL (ref 1.6–2.4)
MCH RBC QN AUTO: 32 PG (ref 26.6–33)
MCHC RBC AUTO-ENTMCNC: 34.5 G/DL (ref 31.5–35.7)
MCV RBC AUTO: 92.8 FL (ref 79–97)
MONOCYTES # BLD AUTO: 0.98 10*3/MM3 (ref 0.1–0.9)
MONOCYTES NFR BLD AUTO: 13.6 % (ref 5–12)
NEUTROPHILS NFR BLD AUTO: 3.99 10*3/MM3 (ref 1.7–7)
NEUTROPHILS NFR BLD AUTO: 55.6 % (ref 42.7–76)
NITRITE UR QL STRIP: NEGATIVE
NRBC BLD AUTO-RTO: 0 /100 WBC (ref 0–0.2)
PH UR STRIP.AUTO: 7 [PH] (ref 5–8)
PLATELET # BLD AUTO: 222 10*3/MM3 (ref 140–450)
PMV BLD AUTO: 8.7 FL (ref 6–12)
POTASSIUM SERPL-SCNC: 4 MMOL/L (ref 3.5–5.2)
PROCALCITONIN SERPL-MCNC: 0.04 NG/ML (ref 0–0.25)
PROT SERPL-MCNC: 7 G/DL (ref 6–8.5)
PROT UR QL STRIP: NEGATIVE
RBC # BLD AUTO: 3.87 10*6/MM3 (ref 3.77–5.28)
RBC # UR STRIP: ABNORMAL /HPF
REF LAB TEST METHOD: ABNORMAL
S PYO AG THROAT QL: NEGATIVE
SARS-COV-2 RNA PNL SPEC NAA+PROBE: DETECTED
SODIUM SERPL-SCNC: 132 MMOL/L (ref 136–145)
SP GR UR STRIP: 1.01 (ref 1–1.03)
SQUAMOUS #/AREA URNS HPF: ABNORMAL /HPF
TROPONIN T SERPL-MCNC: <0.01 NG/ML (ref 0–0.03)
UROBILINOGEN UR QL STRIP: ABNORMAL
WBC # UR STRIP: ABNORMAL /HPF
WBC NRBC COR # BLD: 7.18 10*3/MM3 (ref 3.4–10.8)
WHOLE BLOOD HOLD COAG: NORMAL
WHOLE BLOOD HOLD SPECIMEN: NORMAL

## 2022-08-21 PROCEDURE — 83615 LACTATE (LD) (LDH) ENZYME: CPT | Performed by: PHYSICIAN ASSISTANT

## 2022-08-21 PROCEDURE — 85025 COMPLETE CBC W/AUTO DIFF WBC: CPT | Performed by: EMERGENCY MEDICINE

## 2022-08-21 PROCEDURE — 99284 EMERGENCY DEPT VISIT MOD MDM: CPT

## 2022-08-21 PROCEDURE — 86140 C-REACTIVE PROTEIN: CPT | Performed by: PHYSICIAN ASSISTANT

## 2022-08-21 PROCEDURE — 84484 ASSAY OF TROPONIN QUANT: CPT | Performed by: EMERGENCY MEDICINE

## 2022-08-21 PROCEDURE — 70551 MRI BRAIN STEM W/O DYE: CPT

## 2022-08-21 PROCEDURE — 87636 SARSCOV2 & INF A&B AMP PRB: CPT | Performed by: PHYSICIAN ASSISTANT

## 2022-08-21 PROCEDURE — 87081 CULTURE SCREEN ONLY: CPT | Performed by: PHYSICIAN ASSISTANT

## 2022-08-21 PROCEDURE — 71045 X-RAY EXAM CHEST 1 VIEW: CPT

## 2022-08-21 PROCEDURE — 87880 STREP A ASSAY W/OPTIC: CPT | Performed by: PHYSICIAN ASSISTANT

## 2022-08-21 PROCEDURE — 83735 ASSAY OF MAGNESIUM: CPT | Performed by: EMERGENCY MEDICINE

## 2022-08-21 PROCEDURE — 81001 URINALYSIS AUTO W/SCOPE: CPT | Performed by: EMERGENCY MEDICINE

## 2022-08-21 PROCEDURE — 80053 COMPREHEN METABOLIC PANEL: CPT | Performed by: EMERGENCY MEDICINE

## 2022-08-21 PROCEDURE — 70450 CT HEAD/BRAIN W/O DYE: CPT

## 2022-08-21 PROCEDURE — 93005 ELECTROCARDIOGRAM TRACING: CPT | Performed by: EMERGENCY MEDICINE

## 2022-08-21 PROCEDURE — 84145 PROCALCITONIN (PCT): CPT | Performed by: PHYSICIAN ASSISTANT

## 2022-08-21 PROCEDURE — 83605 ASSAY OF LACTIC ACID: CPT | Performed by: PHYSICIAN ASSISTANT

## 2022-08-21 PROCEDURE — 93005 ELECTROCARDIOGRAM TRACING: CPT

## 2022-08-21 RX ORDER — SODIUM CHLORIDE 0.9 % (FLUSH) 0.9 %
10 SYRINGE (ML) INJECTION AS NEEDED
Status: DISCONTINUED | OUTPATIENT
Start: 2022-08-21 | End: 2022-08-22 | Stop reason: HOSPADM

## 2022-08-21 RX ADMIN — SODIUM CHLORIDE 1000 ML: 9 INJECTION, SOLUTION INTRAVENOUS at 19:36

## 2022-08-21 NOTE — ED PROVIDER NOTES
Subjective   This is a 62-year-old female that presents the ER with 4-day history of some URI symptoms.  She tested positive for COVID-19 with a home test yesterday.  Patient reports rhinorrhea, nasal congestion, nonproductive cough, and burning sensation of the throat.  She also reports generalized headache and diarrhea x2.  Patient has personal history of COVID in December, 2020.  She is fully vaccinated with Pfizer vaccines x3.  Patient also has past medical history significant for Parkinson's disease history of breast cancer, hypothyroidism, migraine headaches, and history of hyponatremia.  Patient says that she had some trouble thought forming today and felt confused.  She said that her  noticed it and was worried that her sodium level may be low since that she has had hyponatremia in the past and that was one of the symptoms she had with hyponatremia.  She was admitted in March, 2022.  The hyponatremia at that time was felt to be secondary to osmotic colonic losses from Fleet enema use for chronic constipation.  Patient also reports some chronic memory loss associated with Parkinson's disease, as well.  She denies any unilateral weakness, numbness, or tingling.  She denies any slurred speech.  She denies dysuria, urgency, or frequency.  Appetite has been decreased and she does report malaise/fatigue, and generalized weakness.  She denies any recent medication changes with her Parkinson's.  No other concerns at this time.      History provided by:  Patient  Altered Mental Status  Presenting symptoms: confusion    Duration:  12 hours  Timing:  Constant  Progression:  Unchanged  Context: recent infection (Pt reports 4 day h/o URI sxs.  Tested (+) for Covid-19 yesterday.  Increased confusion today.  This has happened in the past with hyponatremia.)    Context: not recent change in medication    Context comment:  H/o Covid-19 in 12/20.  4 days ago, she started having NP cough, RN, nasal congestion, burning  "throat, generalized HA, diarrhea x 2.  Took (+) Covid-19 test yesterday.  Today, pt feels confusion.  \"Brain fog\".    Associated symptoms: decreased appetite, headaches and weakness (generalized weakness and malaise/fatigue.)    Associated symptoms: no abdominal pain, no difficulty breathing, no fever, no nausea, no palpitations, no slurred speech, no visual change and no vomiting        Review of Systems   Constitutional: Positive for activity change, appetite change, decreased appetite and fatigue. Negative for fever.   HENT: Positive for congestion, rhinorrhea and sore throat (burning sensation to throat.).         Personal h/o Covid-19 in 12/20.  UTD on 3 Pfizer vaccines.  Pt tested positive yesterday for Covid-19 again at home.   Respiratory: Positive for cough (NP cough). Negative for chest tightness and shortness of breath.    Cardiovascular: Negative.  Negative for chest pain, palpitations and leg swelling.   Gastrointestinal: Positive for diarrhea (x 2 today). Negative for abdominal pain, constipation, nausea and vomiting.   Genitourinary: Negative.  Negative for dysuria, flank pain, frequency and urgency.   Musculoskeletal: Positive for myalgias (generalized body aches).   Neurological: Positive for dizziness (Off balance. Pt says this goes along with her Parkinson's, though.), speech difficulty (No slurred speech), weakness (generalized weakness and malaise/fatigue.) and headaches. Negative for syncope.        History of Parkinsons Disease.  Pt reports some confusion today.  Difficulty thought forming.  This has happened in the past with hyponatremia, as well as her anxiety. No focal deficits.   Psychiatric/Behavioral: Positive for confusion.   All other systems reviewed and are negative.      Past Medical History:   Diagnosis Date   • Cancer (HCC)    • Parkinson's disease (HCC)        Allergies   Allergen Reactions   • Codeine Palpitations       History reviewed. No pertinent surgical history.    History " reviewed. No pertinent family history.    Social History     Socioeconomic History   • Marital status:    Tobacco Use   • Smoking status: Never Smoker   • Smokeless tobacco: Never Used   Vaping Use   • Vaping Use: Never used   Substance and Sexual Activity   • Alcohol use: Yes     Alcohol/week: 2.0 standard drinks     Types: 2 Glasses of wine per week     Comment: denies x 3 weeks   • Drug use: Never   • Sexual activity: Defer           Objective   Physical Exam  Vitals and nursing note reviewed.   Constitutional:       General: She is not in acute distress.     Appearance: Normal appearance. She is ill-appearing. She is not diaphoretic.      Comments: Non-toxic.  No acute sign of pain or distress.   HENT:      Head: Normocephalic and atraumatic.      Right Ear: Tympanic membrane normal.      Left Ear: Tympanic membrane normal.      Ears:      Comments: Bilateral TMs are clear     Nose: Congestion and rhinorrhea present.      Right Sinus: No maxillary sinus tenderness or frontal sinus tenderness.      Left Sinus: No maxillary sinus tenderness or frontal sinus tenderness.      Comments: Audible nasal congestion with rhinorrhea.  No frontal or maxillary sinus tenderness.     Mouth/Throat:      Mouth: Mucous membranes are moist.      Pharynx: Oropharynx is clear. No oropharyngeal exudate or posterior oropharyngeal erythema.      Comments: Oral mucous membranes are moist.  Posterior pharynx is nonerythematous.  No exudate or vesicles.  Eyes:      Extraocular Movements: Extraocular movements intact.      Conjunctiva/sclera: Conjunctivae normal.      Pupils: Pupils are equal, round, and reactive to light.   Neck:      Meningeal: Brudzinski's sign and Kernig's sign absent.   Cardiovascular:      Rate and Rhythm: Normal rate and regular rhythm.  No extrasystoles are present.     Pulses: Normal pulses.      Heart sounds: Normal heart sounds.      Comments: Regular rate and rhythm.  No ectopy.  Pulmonary:      Effort:  Pulmonary effort is normal. No tachypnea or accessory muscle usage.      Breath sounds: Normal breath sounds. No transmitted upper airway sounds. No decreased breath sounds, wheezing or rhonchi.      Comments: Lungs are clear to auscultation bilaterally.  Mild nonproductive cough noted on exam.  No wheezes or rhonchi.  Abdominal:      General: Bowel sounds are normal. There is no distension.      Palpations: Abdomen is soft.      Tenderness: There is no abdominal tenderness. There is no right CVA tenderness, left CVA tenderness, guarding or rebound.      Comments: Abdomen soft and nontender.   Musculoskeletal:         General: Normal range of motion.      Cervical back: Normal range of motion and neck supple.      Right lower leg: No edema.      Left lower leg: No edema.   Lymphadenopathy:      Cervical: No cervical adenopathy.   Skin:     General: Skin is warm and dry.   Neurological:      General: No focal deficit present.      Mental Status: She is alert and oriented to person, place, and time.      Cranial Nerves: Cranial nerves are intact.      Sensory: Sensation is intact.      Motor: Weakness present.      Coordination: Coordination is intact.      Comments: Patient appears generally weak.  No focal deficits.  She follows all commands.  Smile is equal and tongue is midline.  No slurred speech, but patient is slow with answering questioning.  She is having difficulty thinking of words.  Equal  strength 5 out of 5 and equal muscle strength lower extremities 5 out of 5.  She also has a generalized tremor with history of Parkinson's disease.         Procedures           ED Course  ED Course as of 08/22/22 0035   Sun Aug 21, 2022   2127 EKG shows sinus rhythm with PACs.  Chest x-ray revealed no acute cardiopulmonary process.  CBC was within normal limits.  White blood cell count was 7.18 and differential reveals mild elevation in monocytes at 13%.  Chemistries were essentially normal.  Sodium was just slightly  decreased at 132.  Lactic acid and LDH were within normal limits.  Procalcitonin was 0.04.  CRP is 1.43.  Troponin is less than 0.010.  Urinalysis reveals trace leukocytes, negative nitrite, 3-5 white blood cells, and no bacteria.  COVID-19 testing was positive.  CT of the head without contrast reveals no acute intracranial process.  Patient given IV fluid bolus.  Vital signs are stable.  I paged stroke navigator team, Ewelina, to discuss the case to get their input. [FC]   2208 Discussed the case with stroke navigator, Ewelina.  She recommended MRI of the brain without contrast for further assessment due to some new onset of trouble thought forming with all ER work-up looking reassuring other than testing positive for COVID-19. [FC]   Mon Aug 22, 2022   0026 MRI of the brain without contrast reveals no acute intracranial abnormality.  Discussed the case in detail with Dr. Alvarado, ER attending physician.  Suspect brain fog related to COVID-19.  Neurologic exam and vital signs are stable.  Patient lives at home with her  and has strong family support.  Recommend rest, increase fluids, and over-the-counter medications for symptomatic relief.  Return to the ER if any worsening symptoms.  Patient is able to keep p.o. and fluid intake down.  She is ready for discharge to home. [FC]      ED Course User Index  [FC] Katherine Billingsley PA-C            Recent Results (from the past 24 hour(s))   ECG 12 Lead    Collection Time: 08/21/22  6:44 PM   Result Value Ref Range    QT Interval 346 ms    QTC Interval 430 ms   Comprehensive Metabolic Panel    Collection Time: 08/21/22  6:58 PM    Specimen: Blood   Result Value Ref Range    Glucose 88 65 - 99 mg/dL    BUN 11 8 - 23 mg/dL    Creatinine 0.78 0.57 - 1.00 mg/dL    Sodium 132 (L) 136 - 145 mmol/L    Potassium 4.0 3.5 - 5.2 mmol/L    Chloride 96 (L) 98 - 107 mmol/L    CO2 27.0 22.0 - 29.0 mmol/L    Calcium 9.0 8.6 - 10.5 mg/dL    Total Protein 7.0 6.0 - 8.5 g/dL    Albumin  4.50 3.50 - 5.20 g/dL    ALT (SGPT) <5 1 - 33 U/L    AST (SGOT) 27 1 - 32 U/L    Alkaline Phosphatase 72 39 - 117 U/L    Total Bilirubin 0.4 0.0 - 1.2 mg/dL    Globulin 2.5 gm/dL    A/G Ratio 1.8 g/dL    BUN/Creatinine Ratio 14.1 7.0 - 25.0    Anion Gap 9.0 5.0 - 15.0 mmol/L    eGFR 85.5 >60.0 mL/min/1.73   Troponin    Collection Time: 08/21/22  6:58 PM    Specimen: Blood   Result Value Ref Range    Troponin T <0.010 0.000 - 0.030 ng/mL   Magnesium    Collection Time: 08/21/22  6:58 PM    Specimen: Blood   Result Value Ref Range    Magnesium 2.0 1.6 - 2.4 mg/dL   Green Top (Gel)    Collection Time: 08/21/22  6:58 PM   Result Value Ref Range    Extra Tube Hold for add-ons.    Lavender Top    Collection Time: 08/21/22  6:58 PM   Result Value Ref Range    Extra Tube hold for add-on    Gold Top - SST    Collection Time: 08/21/22  6:58 PM   Result Value Ref Range    Extra Tube Hold for add-ons.    Gray Top    Collection Time: 08/21/22  6:58 PM   Result Value Ref Range    Extra Tube Hold for add-ons.    Light Blue Top    Collection Time: 08/21/22  6:58 PM   Result Value Ref Range    Extra Tube Hold for add-ons.    CBC Auto Differential    Collection Time: 08/21/22  6:58 PM    Specimen: Blood   Result Value Ref Range    WBC 7.18 3.40 - 10.80 10*3/mm3    RBC 3.87 3.77 - 5.28 10*6/mm3    Hemoglobin 12.4 12.0 - 15.9 g/dL    Hematocrit 35.9 34.0 - 46.6 %    MCV 92.8 79.0 - 97.0 fL    MCH 32.0 26.6 - 33.0 pg    MCHC 34.5 31.5 - 35.7 g/dL    RDW 12.2 (L) 12.3 - 15.4 %    RDW-SD 41.3 37.0 - 54.0 fl    MPV 8.7 6.0 - 12.0 fL    Platelets 222 140 - 450 10*3/mm3    Neutrophil % 55.6 42.7 - 76.0 %    Lymphocyte % 29.7 19.6 - 45.3 %    Monocyte % 13.6 (H) 5.0 - 12.0 %    Eosinophil % 0.7 0.3 - 6.2 %    Basophil % 0.3 0.0 - 1.5 %    Immature Grans % 0.1 0.0 - 0.5 %    Neutrophils, Absolute 3.99 1.70 - 7.00 10*3/mm3    Lymphocytes, Absolute 2.13 0.70 - 3.10 10*3/mm3    Monocytes, Absolute 0.98 (H) 0.10 - 0.90 10*3/mm3    Eosinophils,  Absolute 0.05 0.00 - 0.40 10*3/mm3    Basophils, Absolute 0.02 0.00 - 0.20 10*3/mm3    Immature Grans, Absolute 0.01 0.00 - 0.05 10*3/mm3    nRBC 0.0 0.0 - 0.2 /100 WBC   Procalcitonin    Collection Time: 08/21/22  6:58 PM    Specimen: Blood   Result Value Ref Range    Procalcitonin 0.04 0.00 - 0.25 ng/mL   Lactate Dehydrogenase    Collection Time: 08/21/22  6:58 PM    Specimen: Blood   Result Value Ref Range     135 - 214 U/L   C-reactive Protein    Collection Time: 08/21/22  6:58 PM    Specimen: Blood   Result Value Ref Range    C-Reactive Protein 1.43 (H) 0.00 - 0.50 mg/dL   Lactic Acid, Plasma    Collection Time: 08/21/22  6:58 PM    Specimen: Blood   Result Value Ref Range    Lactate 0.7 0.5 - 2.0 mmol/L   COVID-19 and FLU A/B PCR - Swab, Nasopharynx    Collection Time: 08/21/22  8:17 PM    Specimen: Nasopharynx; Swab   Result Value Ref Range    COVID19 Detected (C) Not Detected - Ref. Range    Influenza A PCR Not Detected Not Detected    Influenza B PCR Not Detected Not Detected   Rapid Strep A Screen - Swab, Throat    Collection Time: 08/21/22  8:17 PM    Specimen: Throat; Swab   Result Value Ref Range    Strep A Ag Negative Negative   Urinalysis With Microscopic If Indicated (No Culture) - Urine, Clean Catch    Collection Time: 08/21/22  8:37 PM    Specimen: Urine, Clean Catch   Result Value Ref Range    Color, UA Yellow Yellow, Straw    Appearance, UA Clear Clear    pH, UA 7.0 5.0 - 8.0    Specific Gravity, UA 1.012 1.001 - 1.030    Glucose, UA Negative Negative    Ketones, UA Negative Negative    Bilirubin, UA Negative Negative    Blood, UA Negative Negative    Protein, UA Negative Negative    Leuk Esterase, UA Trace (A) Negative    Nitrite, UA Negative Negative    Urobilinogen, UA 0.2 E.U./dL 0.2 - 1.0 E.U./dL   Urinalysis, Microscopic Only - Urine, Clean Catch    Collection Time: 08/21/22  8:37 PM    Specimen: Urine, Clean Catch   Result Value Ref Range    RBC, UA 0-2 None Seen, 0-2 /HPF    WBC,  UA 3-5 (A) None Seen, 0-2 /HPF    Bacteria, UA None Seen None Seen, Trace /HPF    Squamous Epithelial Cells, UA 0-2 None Seen, 0-2 /HPF    Hyaline Casts, UA 0-6 0 - 6 /LPF    Methodology Automated Microscopy      Note: In addition to lab results from this visit, the labs listed above may include labs taken at another facility or during a different encounter within the last 24 hours. Please correlate lab times with ED admission and discharge times for further clarification of the services performed during this visit.    MRI Brain Without Contrast   Final Result       No acute intracranial abnormality.             This examination was interpreted by Lisandro Ulloa M.D.       Electronically signed by:  Lisandro Ulloa M.D.     8/21/2022 9:34 PM Mountain Time      CT Head Without Contrast   Final Result       No acute intracranial abnormality visible by CT.              This examination was interpreted by Lisandro Ulloa M.D.       Electronically signed by:  Lisandro Ulloa M.D.     8/21/2022 7:00 PM Mountain Time      XR Chest 1 View   Final Result   No acute cardiopulmonary process.       This report was finalized on 8/21/2022 7:19 PM by Fay Calero MD.            Vitals:    08/21/22 2155 08/21/22 2156 08/21/22 2157 08/21/22 2159   BP:       BP Location:       Patient Position:       Pulse:       Resp:    18   Temp:       SpO2: 98% 95% 95%    Weight:       Height:         Medications   sodium chloride 0.9 % flush 10 mL (has no administration in time range)   sodium chloride 0.9 % bolus 1,000 mL (0 mL Intravenous Stopped 8/21/22 2158)     ECG/EMG Results (last 24 hours)     Procedure Component Value Units Date/Time    ECG 12 Lead [[id number]] Collected: 08/21/22 1844     Updated: 08/21/22 1844     QT Interval 346 ms      QTC Interval 430 ms     Narrative:      Test Reason : Weak/Dizzy/AMS protocol  Blood Pressure :   */*   mmHG  Vent. Rate :  93 BPM     Atrial Rate :  93 BPM     P-R Int : 198 ms          QRS  Dur :  80 ms      QT Int : 346 ms       P-R-T Axes :  63  23  63 degrees     QTc Int : 430 ms    Sinus rhythm with premature atrial complexes with aberrant conduction  Otherwise normal ECG  No previous ECGs available    Referred By:            Confirmed By:         ECG 12 Lead   Preliminary Result   Test Reason : Weak/Dizzy/AMS protocol   Blood Pressure :   */*   mmHG   Vent. Rate :  93 BPM     Atrial Rate :  93 BPM      P-R Int : 198 ms          QRS Dur :  80 ms       QT Int : 346 ms       P-R-T Axes :  63  23  63 degrees      QTc Int : 430 ms      Sinus rhythm with premature atrial complexes with aberrant conduction   Otherwise normal ECG   No previous ECGs available      Referred By:            Confirmed By:                                             MDM    Final diagnoses:   COVID-19 virus infection   Brain fog   Generalized weakness   Malaise and fatigue   Diarrhea, unspecified type   History of Parkinson's disease   History of breast cancer   History of hypothyroidism   Generalized body aches   COVID-19 vaccine series completed       ED Disposition  ED Disposition     ED Disposition   Discharge    Condition   Stable    Comment   --             Cam Schmidt MD  [address] [facility] [address] [phone]      As needed    [facility] Emergency Department  [address] [place] [id number] [phone]    If symptoms worsen         Medication List      No changes were made to your prescriptions during this visit.          Katherine Billingsley PA-C  08/22/22 0035

## 2022-08-22 VITALS
OXYGEN SATURATION: 97 % | RESPIRATION RATE: 18 BRPM | HEIGHT: 66 IN | WEIGHT: 170 LBS | DIASTOLIC BLOOD PRESSURE: 80 MMHG | HEART RATE: 86 BPM | SYSTOLIC BLOOD PRESSURE: 142 MMHG | TEMPERATURE: 98.6 F | BODY MASS INDEX: 27.32 KG/M2

## 2022-08-22 LAB
QT INTERVAL: 346 MS
QTC INTERVAL: 430 MS

## 2022-08-22 NOTE — DISCHARGE INSTRUCTIONS
ER evaluation reveals positive COVID-19 viral infection.  Labs were within normal limits including CBC and chemistries.  Sodium was mildly decreased at 130 today with normal level being 135, so the current mild confusion is not associated with low sodium levels.  CT of the brain and MRI of the brain without contrast revealed no acute stroke or other acute process.  Neurologic exam and vital signs are stable.  Suspect brain fog related to COVID-19 viral infection.  Urinalysis was also within normal limits and chest x-ray was also normal.  Recommend patient increase fluids, rest, and take over-the-counter medications for symptomatic relief.  Continue with all other current medical management.  Return to the ER if worsening symptoms.

## 2022-08-23 LAB — BACTERIA SPEC AEROBE CULT: NORMAL

## 2023-02-02 ENCOUNTER — OFFICE VISIT (OUTPATIENT)
Dept: FAMILY MEDICINE CLINIC | Facility: CLINIC | Age: 63
End: 2023-02-02
Payer: COMMERCIAL

## 2023-02-02 ENCOUNTER — PATIENT ROUNDING (BHMG ONLY) (OUTPATIENT)
Dept: FAMILY MEDICINE CLINIC | Facility: CLINIC | Age: 63
End: 2023-02-02
Payer: COMMERCIAL

## 2023-02-02 VITALS
OXYGEN SATURATION: 96 % | WEIGHT: 178.8 LBS | HEART RATE: 76 BPM | DIASTOLIC BLOOD PRESSURE: 78 MMHG | BODY MASS INDEX: 28.73 KG/M2 | HEIGHT: 66 IN | TEMPERATURE: 97.8 F | SYSTOLIC BLOOD PRESSURE: 130 MMHG

## 2023-02-02 DIAGNOSIS — Z13.0 SCREENING FOR DEFICIENCY ANEMIA: ICD-10-CM

## 2023-02-02 DIAGNOSIS — F41.1 GENERALIZED ANXIETY DISORDER: ICD-10-CM

## 2023-02-02 DIAGNOSIS — S03.00XA DISLOCATION OF TEMPOROMANDIBULAR JOINT, INITIAL ENCOUNTER: ICD-10-CM

## 2023-02-02 DIAGNOSIS — E03.9 ACQUIRED HYPOTHYROIDISM: Primary | ICD-10-CM

## 2023-02-02 DIAGNOSIS — R73.9 HYPERGLYCEMIA: ICD-10-CM

## 2023-02-02 DIAGNOSIS — G20 PARKINSON'S DISEASE: ICD-10-CM

## 2023-02-02 DIAGNOSIS — Z11.59 NEED FOR HEPATITIS C SCREENING TEST: ICD-10-CM

## 2023-02-02 DIAGNOSIS — Z00.00 WELLNESS EXAMINATION: ICD-10-CM

## 2023-02-02 DIAGNOSIS — E78.2 MIXED HYPERLIPIDEMIA: ICD-10-CM

## 2023-02-02 PROBLEM — C50.811 MALIGNANT NEOPLASM OF OVERLAPPING SITES OF RIGHT BREAST IN FEMALE, ESTROGEN RECEPTOR POSITIVE: Status: ACTIVE | Noted: 2021-11-11

## 2023-02-02 PROBLEM — I10 BP (HIGH BLOOD PRESSURE): Status: ACTIVE | Noted: 2023-02-02

## 2023-02-02 PROBLEM — Z17.0 MALIGNANT NEOPLASM OF OVERLAPPING SITES OF RIGHT BREAST IN FEMALE, ESTROGEN RECEPTOR POSITIVE (HCC): Status: ACTIVE | Noted: 2021-11-11

## 2023-02-02 PROBLEM — M25.561 PAIN IN RIGHT KNEE: Status: ACTIVE | Noted: 2018-02-21

## 2023-02-02 PROBLEM — R00.2 AWARENESS OF HEARTBEATS: Status: ACTIVE | Noted: 2023-02-02

## 2023-02-02 PROBLEM — I10 BP (HIGH BLOOD PRESSURE): Status: RESOLVED | Noted: 2023-02-02 | Resolved: 2023-02-02

## 2023-02-02 PROBLEM — M72.2 PLANTAR FASCIITIS OF LEFT FOOT: Status: ACTIVE | Noted: 2018-12-11

## 2023-02-02 PROBLEM — K59.09 CHRONIC CONSTIPATION: Status: ACTIVE | Noted: 2022-10-24

## 2023-02-02 PROCEDURE — 99203 OFFICE O/P NEW LOW 30 MIN: CPT | Performed by: NURSE PRACTITIONER

## 2023-02-02 RX ORDER — NICOTINE POLACRILEX 2 MG
GUM BUCCAL
COMMUNITY

## 2023-02-02 RX ORDER — DULOXETIN HYDROCHLORIDE 60 MG/1
60 CAPSULE, DELAYED RELEASE ORAL DAILY
COMMUNITY
Start: 2022-12-08

## 2023-02-02 RX ORDER — CHLORAL HYDRATE 500 MG
CAPSULE ORAL
COMMUNITY

## 2023-02-02 RX ORDER — CETIRIZINE HYDROCHLORIDE 10 MG/1
TABLET ORAL
COMMUNITY

## 2023-02-02 RX ORDER — DULOXETIN HYDROCHLORIDE 30 MG/1
30 CAPSULE, DELAYED RELEASE ORAL DAILY
COMMUNITY

## 2023-02-02 RX ORDER — MELOXICAM 15 MG/1
TABLET ORAL
COMMUNITY
Start: 2022-11-15 | End: 2023-02-02

## 2023-02-02 NOTE — PROGRESS NOTES
Chief Complaint   Patient presents with   • new patient preventative medicine service     Est. Care   • Parkinson's Disease     Following up with new PCP   • Hypertension     Following up with new PCP   • Hypothyroidism     Following up with new PCP   • Chronic Constipation      Following up with new PCP       Subjective   Carolina Duarte is a 62 y.o. female    History of Present Illness  Patient in today to establish care.  She does have long-term issues with Parkinson's, high blood pressure, hypothyroidism and chronic constipation.  She also reports about a month and a half ago she bit down on a popcorn kernel and developed some left jaw pain.  This is been going on since that time.  She also has been having some difficulty opening her mouth wide enough.    Allergies   Allergen Reactions   • Codeine Palpitations   • Epinephrine Dizziness and Other (See Comments)     Past Medical History:   Diagnosis Date   • Anxiety 10/1/2015   • Cancer (HCC)    • Hyperthyroidism Pre 9/1/2013   • Parkinson's disease (HCC)       Past Surgical History:   Procedure Laterality Date   • BREAST SURGERY  10/5/2014    lumpectomy   • COLONOSCOPY  ??? 2018    Luis Eli MD twice [facility]     Social History     Socioeconomic History   • Marital status:    Tobacco Use   • Smoking status: Never   • Smokeless tobacco: Never   Vaping Use   • Vaping Use: Never used   Substance and Sexual Activity   • Alcohol use: Yes     Alcohol/week: 7.0 standard drinks     Types: 2 Glasses of wine, 5 Drinks containing 0.5 oz of alcohol per week     Comment: denies x 3 weeks   • Drug use: Never   • Sexual activity: Never        Current Outpatient Medications:   •  Biotin 1 MG capsule, biotin, Disp: , Rfl:   •  Calcium Carb-Cholecalciferol 1000-20 MG-MCG tablet, calcium, Disp: , Rfl:   •  carbidopa-levodopa (SINEMET)  MG per tablet, Take 1.5 tablets by mouth 3 (Three) Times a Day., Disp: , Rfl:   •  cetirizine (zyrTEC) 10 MG tablet, Zyrtec 10  mg tablet, Disp: , Rfl:   •  Cholecalciferol 50 MCG (2000 UT) capsule, Vitamin D3 50 mcg (2,000 unit) capsule  Take by oral route., Disp: , Rfl:   •  DULoxetine (CYMBALTA) 30 MG capsule, Take 30 mg by mouth Daily., Disp: , Rfl:   •  DULoxetine (CYMBALTA) 60 MG capsule, Take 60 mg by mouth Daily., Disp: , Rfl:   •  levothyroxine (SYNTHROID, LEVOTHROID) 75 MCG tablet, Take 75 mcg by mouth Daily., Disp: , Rfl:   •  linaclotide (LINZESS) 145 MCG capsule capsule, Take 72 mcg by mouth Every Morning Before Breakfast., Disp: , Rfl:   •  multivitamin with minerals tablet tablet, Take 1 tablet by mouth Daily., Disp: , Rfl:   •  Omega-3 Fatty Acids (fish oil) 1000 MG capsule capsule, orally, Disp: , Rfl:   •  vitamin B-12 (CYANOCOBALAMIN) 100 MCG tablet, Take 50 mcg by mouth Daily., Disp: , Rfl:      Review of Systems   Constitutional: Positive for fatigue. Negative for appetite change, chills and fever.   HENT: Negative for congestion, ear pain, hearing loss, rhinorrhea, sinus pressure and sore throat.    Eyes: Negative for itching and visual disturbance.   Respiratory: Negative for cough, shortness of breath and wheezing.    Cardiovascular: Positive for palpitations. Negative for chest pain and leg swelling.   Gastrointestinal: Positive for constipation (OK with meds). Negative for abdominal pain, blood in stool, diarrhea, nausea and vomiting.   Endocrine: Negative for cold intolerance, heat intolerance, polydipsia, polyphagia and polyuria.   Genitourinary: Negative for difficulty urinating, dysuria and hematuria.   Musculoskeletal: Positive for arthralgias (left jaw, right knee). Negative for back pain, joint swelling, myalgias and neck pain.        Seeing ortho   Skin: Negative for rash and wound.        Dry skin   Allergic/Immunologic: Negative for environmental allergies and food allergies.   Neurological: Positive for tremors. Negative for dizziness, light-headedness, numbness and headaches.   Hematological: Negative  for adenopathy. Does not bruise/bleed easily.   Psychiatric/Behavioral: Negative for dysphoric mood and sleep disturbance. The patient is nervous/anxious.         Fairly well-controlled currently       Objective     Vitals:    02/02/23 1242   BP: 130/78   Pulse: 76   Temp: 97.8 °F (36.6 °C)   SpO2: 96%         02/02/23  1242   Weight: 81.1 kg (178 lb 12.8 oz)     Body mass index is 28.87 kg/m².  Results for orders placed or performed during the hospital encounter of 08/21/22   COVID-19 and FLU A/B PCR - Swab, Nasopharynx    Specimen: Nasopharynx; Swab   Result Value Ref Range    COVID19 Detected (C) Not Detected - Ref. Range    Influenza A PCR Not Detected Not Detected    Influenza B PCR Not Detected Not Detected   Rapid Strep A Screen - Swab, Throat    Specimen: Throat; Swab   Result Value Ref Range    Strep A Ag Negative Negative   Beta Strep Culture, Throat - Swab, Throat    Specimen: Throat; Swab   Result Value Ref Range    Throat Culture, Beta Strep No Beta Hemolytic Streptococcus Isolated    Comprehensive Metabolic Panel    Specimen: Blood   Result Value Ref Range    Glucose 88 65 - 99 mg/dL    BUN 11 8 - 23 mg/dL    Creatinine 0.78 0.57 - 1.00 mg/dL    Sodium 132 (L) 136 - 145 mmol/L    Potassium 4.0 3.5 - 5.2 mmol/L    Chloride 96 (L) 98 - 107 mmol/L    CO2 27.0 22.0 - 29.0 mmol/L    Calcium 9.0 8.6 - 10.5 mg/dL    Total Protein 7.0 6.0 - 8.5 g/dL    Albumin 4.50 3.50 - 5.20 g/dL    ALT (SGPT) <5 1 - 33 U/L    AST (SGOT) 27 1 - 32 U/L    Alkaline Phosphatase 72 39 - 117 U/L    Total Bilirubin 0.4 0.0 - 1.2 mg/dL    Globulin 2.5 gm/dL    A/G Ratio 1.8 g/dL    BUN/Creatinine Ratio 14.1 7.0 - 25.0    Anion Gap 9.0 5.0 - 15.0 mmol/L    eGFR 85.5 >60.0 mL/min/1.73   Troponin    Specimen: Blood   Result Value Ref Range    Troponin T <0.010 0.000 - 0.030 ng/mL   Magnesium    Specimen: Blood   Result Value Ref Range    Magnesium 2.0 1.6 - 2.4 mg/dL   Urinalysis With Microscopic If Indicated (No Culture) - Urine,  Clean Catch    Specimen: Urine, Clean Catch   Result Value Ref Range    Color, UA Yellow Yellow, Straw    Appearance, UA Clear Clear    pH, UA 7.0 5.0 - 8.0    Specific Gravity, UA 1.012 1.001 - 1.030    Glucose, UA Negative Negative    Ketones, UA Negative Negative    Bilirubin, UA Negative Negative    Blood, UA Negative Negative    Protein, UA Negative Negative    Leuk Esterase, UA Trace (A) Negative    Nitrite, UA Negative Negative    Urobilinogen, UA 0.2 E.U./dL 0.2 - 1.0 E.U./dL   CBC Auto Differential    Specimen: Blood   Result Value Ref Range    WBC 7.18 3.40 - 10.80 10*3/mm3    RBC 3.87 3.77 - 5.28 10*6/mm3    Hemoglobin 12.4 12.0 - 15.9 g/dL    Hematocrit 35.9 34.0 - 46.6 %    MCV 92.8 79.0 - 97.0 fL    MCH 32.0 26.6 - 33.0 pg    MCHC 34.5 31.5 - 35.7 g/dL    RDW 12.2 (L) 12.3 - 15.4 %    RDW-SD 41.3 37.0 - 54.0 fl    MPV 8.7 6.0 - 12.0 fL    Platelets 222 140 - 450 10*3/mm3    Neutrophil % 55.6 42.7 - 76.0 %    Lymphocyte % 29.7 19.6 - 45.3 %    Monocyte % 13.6 (H) 5.0 - 12.0 %    Eosinophil % 0.7 0.3 - 6.2 %    Basophil % 0.3 0.0 - 1.5 %    Immature Grans % 0.1 0.0 - 0.5 %    Neutrophils, Absolute 3.99 1.70 - 7.00 10*3/mm3    Lymphocytes, Absolute 2.13 0.70 - 3.10 10*3/mm3    Monocytes, Absolute 0.98 (H) 0.10 - 0.90 10*3/mm3    Eosinophils, Absolute 0.05 0.00 - 0.40 10*3/mm3    Basophils, Absolute 0.02 0.00 - 0.20 10*3/mm3    Immature Grans, Absolute 0.01 0.00 - 0.05 10*3/mm3    nRBC 0.0 0.0 - 0.2 /100 WBC   Procalcitonin    Specimen: Blood   Result Value Ref Range    Procalcitonin 0.04 0.00 - 0.25 ng/mL   Lactate Dehydrogenase    Specimen: Blood   Result Value Ref Range     135 - 214 U/L   C-reactive Protein    Specimen: Blood   Result Value Ref Range    C-Reactive Protein 1.43 (H) 0.00 - 0.50 mg/dL   Lactic Acid, Plasma    Specimen: Blood   Result Value Ref Range    Lactate 0.7 0.5 - 2.0 mmol/L   Urinalysis, Microscopic Only - Urine, Clean Catch    Specimen: Urine, Clean Catch   Result Value Ref  Range    RBC, UA 0-2 None Seen, 0-2 /HPF    WBC, UA 3-5 (A) None Seen, 0-2 /HPF    Bacteria, UA None Seen None Seen, Trace /HPF    Squamous Epithelial Cells, UA 0-2 None Seen, 0-2 /HPF    Hyaline Casts, UA 0-6 0 - 6 /LPF    Methodology Automated Microscopy    ECG 12 Lead   Result Value Ref Range    QT Interval 346 ms    QTC Interval 430 ms   Green Top (Gel)   Result Value Ref Range    Extra Tube Hold for add-ons.    Lavender Top   Result Value Ref Range    Extra Tube hold for add-on    Gold Top - SST   Result Value Ref Range    Extra Tube Hold for add-ons.    Gray Top   Result Value Ref Range    Extra Tube Hold for add-ons.    Light Blue Top   Result Value Ref Range    Extra Tube Hold for add-ons.        Physical Exam  Vitals reviewed.   Constitutional:       Appearance: She is well-developed.   HENT:      Head: Normocephalic and atraumatic.      Jaw: Tenderness (left with crepitis) and pain on movement present. No swelling or malocclusion.   Cardiovascular:      Rate and Rhythm: Normal rate and regular rhythm.      Heart sounds: Normal heart sounds.   Pulmonary:      Effort: Pulmonary effort is normal.      Breath sounds: Normal breath sounds.   Skin:     General: Skin is warm and dry.   Neurological:      Mental Status: She is alert and oriented to person, place, and time.   Psychiatric:         Behavior: Behavior normal.         Assessment & Plan   Problems Addressed this Visit        Cardiac and Vasculature    Hyperlipidemia    Relevant Orders    Lipid Panel       Endocrine and Metabolic    Hyperglycemia    Relevant Orders    Comprehensive Metabolic Panel    Hypothyroidism - Primary    Relevant Orders    TSH Rfx On Abnormal To Free T4       Mental Health    Generalized anxiety disorder    Relevant Medications    DULoxetine (CYMBALTA) 60 MG capsule    DULoxetine (CYMBALTA) 30 MG capsule       Neuro    Parkinsonism (HCC)   Other Visit Diagnoses     Wellness examination        Relevant Orders    Comprehensive  Metabolic Panel    Lipid Panel    TSH Rfx On Abnormal To Free T4    CBC (No Diff)    Hepatitis C Antibody    Need for hepatitis C screening test        Relevant Orders    Hepatitis C Antibody    Screening for deficiency anemia        Relevant Orders    CBC (No Diff)    Dislocation of temporomandibular joint, initial encounter          Diagnoses       Codes Comments    Acquired hypothyroidism    -  Primary ICD-10-CM: E03.9  ICD-9-CM: 244.9     Parkinson's disease (HCC)     ICD-10-CM: G20  ICD-9-CM: 332.0     Generalized anxiety disorder     ICD-10-CM: F41.1  ICD-9-CM: 300.02     Hyperglycemia     ICD-10-CM: R73.9  ICD-9-CM: 790.29     Mixed hyperlipidemia     ICD-10-CM: E78.2  ICD-9-CM: 272.2     Wellness examination     ICD-10-CM: Z00.00  ICD-9-CM: V70.0     Need for hepatitis C screening test     ICD-10-CM: Z11.59  ICD-9-CM: V73.89     Screening for deficiency anemia     ICD-10-CM: Z13.0  ICD-9-CM: V78.1     Dislocation of temporomandibular joint, initial encounter     ICD-10-CM: S03.00XA  ICD-9-CM: 830.0       We will going to check labs for upcoming physical make determinations pending on the outcome of these results. Will obtain routine labs today and make further recommendations pending results. All lab results are automatically released to [facility] immediately when they return whether they have been reviewed or not. The patient will be notified about the results, regardless of the findings. If they have not been contacted by the office within 2 weeks after the test has been performed, I want them to contact us to learn about the results.    For anxiety we will continue her on her current medications at this time.    For Parkinson's I want her to continue to follow-up with her neurologist.    For TMJ I have given her some exercises to do and I would like her to take the anti-inflammatory, meloxicam, that she is currently to be taking. Patient was encouraged to keep me informed of any acute changes, lack of  improvement, or any new concerning symptoms.  If patient becomes concerned, or has any worsening symptoms, they are to report either here, urgent treatment, or emergency room. Plan of care discussed with pt. They verbalized understanding and agreement.     For her right knee pain I recommend at this time she continue using the anti-inflammatory, continue following up with orthopedics.    Time spent on visit, including counseling, education, reviewing the chart, and any recent test results, was    30    Minutes    Return visit in 3 months with Dr Vasquez    Counseling provided on TMJ.    Checo Henley, APRN   2/2/2023   13:29 EST     Please note that portions of this document were completed with a voice recognition program.     At [facility], we believe that sharing information builds trust and better relationships. You are receiving this note because you are receiving care at [facility] or have recently visited. It is possible you will see health information before a provider has talked with you about it. This kind of information can be easy to misunderstand. To help you fully understand what it means for your health, we urge you to discuss this note with your provider.

## 2023-02-13 ENCOUNTER — LAB (OUTPATIENT)
Dept: LAB | Facility: HOSPITAL | Age: 63
End: 2023-02-13
Payer: COMMERCIAL

## 2023-02-13 DIAGNOSIS — Z13.0 SCREENING FOR DEFICIENCY ANEMIA: ICD-10-CM

## 2023-02-13 DIAGNOSIS — E78.2 MIXED HYPERLIPIDEMIA: ICD-10-CM

## 2023-02-13 DIAGNOSIS — Z11.59 NEED FOR HEPATITIS C SCREENING TEST: ICD-10-CM

## 2023-02-13 DIAGNOSIS — Z00.00 WELLNESS EXAMINATION: ICD-10-CM

## 2023-02-13 DIAGNOSIS — E03.9 ACQUIRED HYPOTHYROIDISM: ICD-10-CM

## 2023-02-13 DIAGNOSIS — R73.9 HYPERGLYCEMIA: ICD-10-CM

## 2023-02-13 LAB
ALBUMIN SERPL-MCNC: 4.7 G/DL (ref 3.5–5.2)
ALBUMIN/GLOB SERPL: 2 G/DL
ALP SERPL-CCNC: 70 U/L (ref 39–117)
ALT SERPL W P-5'-P-CCNC: <5 U/L (ref 1–33)
ANION GAP SERPL CALCULATED.3IONS-SCNC: 7.7 MMOL/L (ref 5–15)
AST SERPL-CCNC: 21 U/L (ref 1–32)
BILIRUB SERPL-MCNC: 0.4 MG/DL (ref 0–1.2)
BUN SERPL-MCNC: 16 MG/DL (ref 8–23)
BUN/CREAT SERPL: 18.8 (ref 7–25)
CALCIUM SPEC-SCNC: 10.2 MG/DL (ref 8.6–10.5)
CHLORIDE SERPL-SCNC: 98 MMOL/L (ref 98–107)
CHOLEST SERPL-MCNC: 216 MG/DL (ref 0–200)
CO2 SERPL-SCNC: 27.3 MMOL/L (ref 22–29)
CREAT SERPL-MCNC: 0.85 MG/DL (ref 0.57–1)
DEPRECATED RDW RBC AUTO: 42.2 FL (ref 37–54)
EGFRCR SERPLBLD CKD-EPI 2021: 77.1 ML/MIN/1.73
ERYTHROCYTE [DISTWIDTH] IN BLOOD BY AUTOMATED COUNT: 12.4 % (ref 12.3–15.4)
GLOBULIN UR ELPH-MCNC: 2.4 GM/DL
GLUCOSE SERPL-MCNC: 89 MG/DL (ref 65–99)
HCT VFR BLD AUTO: 36.6 % (ref 34–46.6)
HCV AB SER DONR QL: NORMAL
HDLC SERPL-MCNC: 82 MG/DL (ref 40–60)
HGB BLD-MCNC: 12.7 G/DL (ref 12–15.9)
LDLC SERPL CALC-MCNC: 118 MG/DL (ref 0–100)
LDLC/HDLC SERPL: 1.41 {RATIO}
MCH RBC QN AUTO: 32.2 PG (ref 26.6–33)
MCHC RBC AUTO-ENTMCNC: 34.7 G/DL (ref 31.5–35.7)
MCV RBC AUTO: 92.7 FL (ref 79–97)
PLATELET # BLD AUTO: 283 10*3/MM3 (ref 140–450)
PMV BLD AUTO: 9.2 FL (ref 6–12)
POTASSIUM SERPL-SCNC: 4.3 MMOL/L (ref 3.5–5.2)
PROT SERPL-MCNC: 7.1 G/DL (ref 6–8.5)
RBC # BLD AUTO: 3.95 10*6/MM3 (ref 3.77–5.28)
SODIUM SERPL-SCNC: 133 MMOL/L (ref 136–145)
TRIGL SERPL-MCNC: 91 MG/DL (ref 0–150)
TSH SERPL DL<=0.05 MIU/L-ACNC: 2.05 UIU/ML (ref 0.27–4.2)
VLDLC SERPL-MCNC: 16 MG/DL (ref 5–40)
WBC NRBC COR # BLD: 7.71 10*3/MM3 (ref 3.4–10.8)

## 2023-02-13 PROCEDURE — 36415 COLL VENOUS BLD VENIPUNCTURE: CPT

## 2023-02-13 PROCEDURE — 85027 COMPLETE CBC AUTOMATED: CPT

## 2023-02-13 PROCEDURE — 80061 LIPID PANEL: CPT

## 2023-02-13 PROCEDURE — 84443 ASSAY THYROID STIM HORMONE: CPT

## 2023-02-13 PROCEDURE — 86803 HEPATITIS C AB TEST: CPT

## 2023-02-13 PROCEDURE — 80053 COMPREHEN METABOLIC PANEL: CPT

## 2023-05-02 ENCOUNTER — OFFICE VISIT (OUTPATIENT)
Dept: FAMILY MEDICINE CLINIC | Facility: CLINIC | Age: 63
End: 2023-05-02
Payer: COMMERCIAL

## 2023-05-02 VITALS
BODY MASS INDEX: 27.97 KG/M2 | SYSTOLIC BLOOD PRESSURE: 122 MMHG | OXYGEN SATURATION: 95 % | WEIGHT: 174 LBS | HEIGHT: 66 IN | HEART RATE: 69 BPM | DIASTOLIC BLOOD PRESSURE: 80 MMHG

## 2023-05-02 DIAGNOSIS — F41.1 GENERALIZED ANXIETY DISORDER: ICD-10-CM

## 2023-05-02 DIAGNOSIS — Z00.00 ANNUAL PHYSICAL EXAM: Primary | ICD-10-CM

## 2023-05-02 DIAGNOSIS — E03.9 ACQUIRED HYPOTHYROIDISM: ICD-10-CM

## 2023-05-02 DIAGNOSIS — G20 PARKINSON'S DISEASE: ICD-10-CM

## 2023-05-02 DIAGNOSIS — Z12.4 CERVICAL CANCER SCREENING: ICD-10-CM

## 2023-05-02 DIAGNOSIS — M85.80 OSTEOPENIA, UNSPECIFIED LOCATION: ICD-10-CM

## 2023-05-02 DIAGNOSIS — Z12.11 COLON CANCER SCREENING: ICD-10-CM

## 2023-05-02 DIAGNOSIS — E78.2 MIXED HYPERLIPIDEMIA: ICD-10-CM

## 2023-05-02 PROCEDURE — 99396 PREV VISIT EST AGE 40-64: CPT | Performed by: STUDENT IN AN ORGANIZED HEALTH CARE EDUCATION/TRAINING PROGRAM

## 2023-05-02 NOTE — PROGRESS NOTES
Physical Exam     Patient Name: Carolina Duarte  : 1960   MRN: [id number]   Care Team: Patient Care Team:  Checo Henley APRN as PCP - General (Family Medicine)    Chief Complaint:    Chief Complaint   Patient presents with   • Annual Exam       History of Present Illness: Carolina Duarte is a 62 y.o. female who is presents today for annual healthcare maintenance. Feeling well today, no acute complaints.   PHQ-2 Depression Screening  Little interest or pleasure in doing things? 0-->not at all   Feeling down, depressed, or hopeless? 0-->not at all   PHQ-2 Total Score 0       Health Maintenance   Topic Date Due   • COLORECTAL CANCER SCREENING  Never done   • PAP SMEAR  Never done   • INFLUENZA VACCINE  2023   • MAMMOGRAM  2023   • TDAP/TD VACCINES (2 - Td or Tdap) 2023   • LIPID PANEL  2024   • ANNUAL PHYSICAL  2024   • HEPATITIS C SCREENING  Completed   • COVID-19 Vaccine  Completed   • ZOSTER VACCINE  Completed   • Pneumococcal Vaccine 0-64  Aged Out       Subjective      Review of Systems:   Review of Systems - See HPI    Past Medical History:   Past Medical History:   Diagnosis Date   • Allergic 2003    One shot every 2 weeks   • Anxiety 10/1/2015   • Arthritis Dec 2022    75 mg Twice/day   • Cancer    • Hyperthyroidism Pre 2013   • Parkinson's disease        Past Surgical History:   Past Surgical History:   Procedure Laterality Date   • BREAST SURGERY  10/5/2014    lumpectomy   • COLONOSCOPY  ??? 2018    Luis Eli MD [facility]       Family History:   Family History   Problem Relation Age of Onset   • Cancer Mother         bladder 2022   • Alcohol abuse Father          92   • Cancer Maternal Aunt         breast   • Stroke Sister         double stroke 2022       Social History:   Social History     Socioeconomic History   • Marital status:    Tobacco Use   • Smoking status: Never   • Smokeless tobacco: Never   Vaping Use   •  Vaping Use: Never used   Substance and Sexual Activity   • Alcohol use: Yes     Alcohol/week: 7.0 standard drinks     Types: 2 Glasses of wine, 5 Drinks containing 0.5 oz of alcohol per week     Comment: denies x 3 weeks   • Drug use: Never   • Sexual activity: Never       Tobacco History:   Social History     Tobacco Use   Smoking Status Never   Smokeless Tobacco Never       Medications:     Current Outpatient Medications:   •  Biotin 1 MG capsule, biotin, Disp: , Rfl:   •  Calcium Carb-Cholecalciferol 1000-20 MG-MCG tablet, calcium, Disp: , Rfl:   •  carbidopa-levodopa (SINEMET)  MG per tablet, Take 1.5 tablets by mouth 3 (Three) Times a Day., Disp: , Rfl:   •  cetirizine (zyrTEC) 10 MG tablet, Zyrtec 10 mg tablet, Disp: , Rfl:   •  Cholecalciferol 50 MCG (2000 UT) capsule, Vitamin D3 50 mcg (2,000 unit) capsule  Take by oral route., Disp: , Rfl:   •  DULoxetine (CYMBALTA) 30 MG capsule, Take 1 capsule by mouth Daily., Disp: , Rfl:   •  DULoxetine (CYMBALTA) 60 MG capsule, Take 1 capsule by mouth Daily., Disp: , Rfl:   •  levothyroxine (SYNTHROID, LEVOTHROID) 75 MCG tablet, Take 1 tablet by mouth Daily., Disp: , Rfl:   •  linaclotide (LINZESS) 145 MCG capsule capsule, Take 72 mcg by mouth Every Morning Before Breakfast., Disp: , Rfl:   •  multivitamin with minerals tablet tablet, Take 1 tablet by mouth Daily., Disp: , Rfl:   •  Omega-3 Fatty Acids (fish oil) 1000 MG capsule capsule, orally, Disp: , Rfl:   •  vitamin B-12 (CYANOCOBALAMIN) 100 MCG tablet, Take 50 mcg by mouth Daily., Disp: , Rfl:     Allergies:   Allergies   Allergen Reactions   • Codeine Palpitations   • Epinephrine Dizziness and Other (See Comments)       Past Medical History, Social History, Family History and Care Team were all reviewed with patient and updated as appropriate.       Objective     Physical Exam:  Vital Signs:   Vitals:    05/02/23 0752   BP: 122/80   Pulse: 69   SpO2: 95%   Weight: 78.9 kg (174 lb)   Height: 167.6 cm  "(65.98\")     Body mass index is 28.1 kg/m².     Physical Exam  Vitals reviewed.   Constitutional:       Appearance: Normal appearance. She is not ill-appearing.   Cardiovascular:      Rate and Rhythm: Normal rate and regular rhythm.      Pulses: Normal pulses.      Heart sounds: Normal heart sounds. No murmur heard.  Pulmonary:      Effort: Pulmonary effort is normal. No respiratory distress.      Breath sounds: Normal breath sounds.   Skin:     General: Skin is warm and dry.   Neurological:      Mental Status: She is alert. Mental status is at baseline.   Psychiatric:         Mood and Affect: Mood normal.         Behavior: Behavior normal.         Judgment: Judgment normal.       Glucose   Date Value Ref Range Status   02/13/2023 89 65 - 99 mg/dL Final     BUN   Date Value Ref Range Status   02/13/2023 16 8 - 23 mg/dL Final     Creatinine   Date Value Ref Range Status   02/13/2023 0.85 0.57 - 1.00 mg/dL Final     Sodium   Date Value Ref Range Status   02/13/2023 133 (L) 136 - 145 mmol/L Final     Potassium   Date Value Ref Range Status   02/13/2023 4.3 3.5 - 5.2 mmol/L Final     Chloride   Date Value Ref Range Status   02/13/2023 98 98 - 107 mmol/L Final     CO2   Date Value Ref Range Status   02/13/2023 27.3 22.0 - 29.0 mmol/L Final     Calcium   Date Value Ref Range Status   02/13/2023 10.2 8.6 - 10.5 mg/dL Final     Total Protein   Date Value Ref Range Status   02/13/2023 7.1 6.0 - 8.5 g/dL Final     Albumin   Date Value Ref Range Status   02/13/2023 4.7 3.5 - 5.2 g/dL Final     ALT (SGPT)   Date Value Ref Range Status   02/13/2023 <5 1 - 33 U/L Final     AST (SGOT)   Date Value Ref Range Status   02/13/2023 21 1 - 32 U/L Final     Alkaline Phosphatase   Date Value Ref Range Status   02/13/2023 70 39 - 117 U/L Final     Total Bilirubin   Date Value Ref Range Status   02/13/2023 0.4 0.0 - 1.2 mg/dL Final     eGFR Non  Amer   Date Value Ref Range Status   03/05/2021 71 >60 mL/min/1.73 Final "     BUN/Creatinine Ratio   Date Value Ref Range Status   02/13/2023 18.8 7.0 - 25.0 Final     Anion Gap   Date Value Ref Range Status   02/13/2023 7.7 5.0 - 15.0 mmol/L Final     WBC   Date Value Ref Range Status   02/13/2023 7.71 3.40 - 10.80 10*3/mm3 Final     RBC   Date Value Ref Range Status   02/13/2023 3.95 3.77 - 5.28 10*6/mm3 Final     Hemoglobin   Date Value Ref Range Status   02/13/2023 12.7 12.0 - 15.9 g/dL Final     Hematocrit   Date Value Ref Range Status   02/13/2023 36.6 34.0 - 46.6 % Final     MCV   Date Value Ref Range Status   02/13/2023 92.7 79.0 - 97.0 fL Final     MCH   Date Value Ref Range Status   02/13/2023 32.2 26.6 - 33.0 pg Final     MCHC   Date Value Ref Range Status   02/13/2023 34.7 31.5 - 35.7 g/dL Final     RDW   Date Value Ref Range Status   02/13/2023 12.4 12.3 - 15.4 % Final     RDW-SD   Date Value Ref Range Status   02/13/2023 42.2 37.0 - 54.0 fl Final     MPV   Date Value Ref Range Status   02/13/2023 9.2 6.0 - 12.0 fL Final     Platelets   Date Value Ref Range Status   02/13/2023 283 140 - 450 10*3/mm3 Final     Neutrophil %   Date Value Ref Range Status   08/21/2022 55.6 42.7 - 76.0 % Final     Lymphocyte %   Date Value Ref Range Status   08/21/2022 29.7 19.6 - 45.3 % Final     Monocyte %   Date Value Ref Range Status   08/21/2022 13.6 (H) 5.0 - 12.0 % Final     Eosinophil %   Date Value Ref Range Status   08/21/2022 0.7 0.3 - 6.2 % Final     Basophil %   Date Value Ref Range Status   08/21/2022 0.3 0.0 - 1.5 % Final     Immature Grans %   Date Value Ref Range Status   08/21/2022 0.1 0.0 - 0.5 % Final     Neutrophils, Absolute   Date Value Ref Range Status   08/21/2022 3.99 1.70 - 7.00 10*3/mm3 Final     Lymphocytes, Absolute   Date Value Ref Range Status   08/21/2022 2.13 0.70 - 3.10 10*3/mm3 Final     Monocytes, Absolute   Date Value Ref Range Status   08/21/2022 0.98 (H) 0.10 - 0.90 10*3/mm3 Final     Eosinophils, Absolute   Date Value Ref Range Status   08/21/2022 0.05 0.00  - 0.40 10*3/mm3 Final     Basophils, Absolute   Date Value Ref Range Status   08/21/2022 0.02 0.00 - 0.20 10*3/mm3 Final     Immature Grans, Absolute   Date Value Ref Range Status   08/21/2022 0.01 0.00 - 0.05 10*3/mm3 Final     nRBC   Date Value Ref Range Status   08/21/2022 0.0 0.0 - 0.2 /100 WBC Final     TSH        2/13/2023    11:01   TSH   TSH 2.050       Lipid Panel        2/13/2023    11:01   Lipid Panel   Total Cholesterol 216     Triglycerides 91     HDL Cholesterol 82     VLDL Cholesterol 16     LDL Cholesterol  118     LDL/HDL Ratio 1.41         Assessment / Plan      Assessment/Plan:   Problems Addressed This Visit  Diagnoses and all orders for this visit:    1. Annual physical exam (Primary)    2. Colon cancer screening    3. Cervical cancer screening    4. Acquired hypothyroidism    5. Parkinson's disease    6. Generalized anxiety disorder    7. Mixed hyperlipidemia    8. Osteopenia, unspecified location        Healthcare Maintenance   Vaccines:  UTD    Cancer Screening  -Mammogram 11/2022, repeat annually  -Colonoscopy - reports last was at [facility] 3-4 years ago, normal per patient. Will request records.   -Pap smear today with HPV cotesting     Other  -DXA - osteopenia on last DEXA, unsure how long ago it was, records requested and will repeat if >2 years ago. She is compliant with calcium and vitamin D supplement. Continue weight bearing exercises as able. Avoid excess alcohol use.   -PHQ score 0  -Counseled on importance of maintaining healthy diet and routine exercise. Encouraged 150 min exercise per week.   -LDL cholesterol 118 on most recent labs - PCP recommends low fat diet and I have emphasized this to achieve goal LDL <100  -Tobacco cessation: not indicated,nonsmoker  -Blood pressure is at goal <130/80  -Metabolic screening UTD    Encouraged routine eye and dental exams.     Chronic medical problems are relatively well controlled. She does admit to some anxiety which is partially  controlled with cymbalta. Planning to discuss further with her PCP or Neurologist.     Plan of care reviewed with patient at the conclusion of today's visit. Education was provided regarding diagnosis and management.  Patient verbalizes understanding of and agreement with management plan.    Follow Up:   No follow-ups on file.        DO DAVID Drake RD  [facility]  [address]  [facility] [address]  Fax [phone]  Phone [phone]

## 2023-05-02 NOTE — Clinical Note
Please request colonoscopy records from [facility] - patient unsure of provider name.

## 2023-05-04 LAB — REF LAB TEST METHOD: NORMAL

## 2023-07-03 PROBLEM — R41.82 ALTERED MENTAL STATUS, UNSPECIFIED ALTERED MENTAL STATUS TYPE: Status: ACTIVE | Noted: 2023-07-03

## 2023-07-03 PROBLEM — R03.0 ELEVATED BP WITHOUT DIAGNOSIS OF HYPERTENSION: Status: ACTIVE | Noted: 2023-07-03

## 2023-07-12 PROBLEM — R68.84 CHRONIC JAW PAIN: Status: ACTIVE | Noted: 2023-07-12

## 2023-07-12 PROBLEM — F41.9 ANXIETY: Status: ACTIVE | Noted: 2021-12-10

## 2023-07-12 PROBLEM — E55.9 VITAMIN D DEFICIENCY: Status: ACTIVE | Noted: 2021-12-10

## 2023-07-12 PROBLEM — K59.04 CHRONIC IDIOPATHIC CONSTIPATION: Status: ACTIVE | Noted: 2022-10-24

## 2023-07-12 PROBLEM — G89.29 CHRONIC JAW PAIN: Status: ACTIVE | Noted: 2023-07-12

## 2023-09-04 ENCOUNTER — APPOINTMENT (OUTPATIENT)
Dept: CT IMAGING | Facility: HOSPITAL | Age: 63
End: 2023-09-04
Payer: COMMERCIAL

## 2023-09-04 ENCOUNTER — HOSPITAL ENCOUNTER (EMERGENCY)
Facility: HOSPITAL | Age: 63
Discharge: HOME | End: 2023-09-04
Attending: EMERGENCY MEDICINE | Admitting: EMERGENCY MEDICINE
Payer: COMMERCIAL

## 2023-09-04 ENCOUNTER — APPOINTMENT (OUTPATIENT)
Dept: GENERAL RADIOLOGY | Facility: HOSPITAL | Age: 63
End: 2023-09-04
Payer: COMMERCIAL

## 2023-09-04 VITALS
RESPIRATION RATE: 18 BRPM | HEART RATE: 79 BPM | OXYGEN SATURATION: 98 % | DIASTOLIC BLOOD PRESSURE: 85 MMHG | WEIGHT: 175 LBS | HEIGHT: 65 IN | SYSTOLIC BLOOD PRESSURE: 158 MMHG | TEMPERATURE: 98.1 F | BODY MASS INDEX: 29.16 KG/M2

## 2023-09-04 DIAGNOSIS — R42 DIZZINESS: Primary | ICD-10-CM

## 2023-09-04 DIAGNOSIS — N30.00 ACUTE CYSTITIS WITHOUT HEMATURIA: ICD-10-CM

## 2023-09-04 LAB
ALBUMIN SERPL-MCNC: 4.5 G/DL (ref 3.5–5.2)
ALBUMIN/GLOB SERPL: 1.6 G/DL
ALP SERPL-CCNC: 83 U/L (ref 39–117)
ALT SERPL W P-5'-P-CCNC: <5 U/L (ref 1–33)
ANION GAP SERPL CALCULATED.3IONS-SCNC: 11 MMOL/L (ref 5–15)
AST SERPL-CCNC: 25 U/L (ref 1–32)
BACTERIA UR QL AUTO: ABNORMAL /HPF
BASOPHILS # BLD AUTO: 0.04 10*3/MM3 (ref 0–0.2)
BASOPHILS NFR BLD AUTO: 0.5 % (ref 0–1.5)
BILIRUB SERPL-MCNC: 0.4 MG/DL (ref 0–1.2)
BILIRUB UR QL STRIP: NEGATIVE
BUN BLDA-MCNC: 18 MG/DL
BUN SERPL-MCNC: 17 MG/DL (ref 8–23)
BUN/CREAT SERPL: 20.5 (ref 7–25)
CALCIUM SPEC-SCNC: 9.3 MG/DL (ref 8.6–10.5)
CHLORIDE SERPL-SCNC: 95 MMOL/L (ref 98–107)
CLARITY UR: ABNORMAL
CO2 SERPL-SCNC: 27 MMOL/L (ref 22–29)
COLOR UR: YELLOW
CREAT BLDA-MCNC: 0.8 MG/DL
CREAT SERPL-MCNC: 0.83 MG/DL (ref 0.57–1)
DEPRECATED RDW RBC AUTO: 43.1 FL (ref 37–54)
EGFRCR SERPLBLD CKD-EPI 2021: 78.8 ML/MIN/1.73
EOSINOPHIL # BLD AUTO: 0.11 10*3/MM3 (ref 0–0.4)
EOSINOPHIL NFR BLD AUTO: 1.4 % (ref 0.3–6.2)
ERYTHROCYTE [DISTWIDTH] IN BLOOD BY AUTOMATED COUNT: 12.2 % (ref 12.3–15.4)
GLOBULIN UR ELPH-MCNC: 2.9 GM/DL
GLUCOSE BLDC GLUCOMTR-MCNC: 104 MG/DL (ref 70–130)
GLUCOSE SERPL-MCNC: 106 MG/DL (ref 65–99)
GLUCOSE UR STRIP-MCNC: NEGATIVE MG/DL
HCT VFR BLD AUTO: 40.9 % (ref 34–46.6)
HCT VFR BLDA CALC: 41 % (ref 38–51)
HGB BLD-MCNC: 13.7 G/DL (ref 12–15.9)
HGB BLDA-MCNC: 13.9 G/DL (ref 12–17)
HGB UR QL STRIP.AUTO: NEGATIVE
HOLD SPECIMEN: NORMAL
HYALINE CASTS UR QL AUTO: ABNORMAL /LPF
IMM GRANULOCYTES # BLD AUTO: 0.02 10*3/MM3 (ref 0–0.05)
IMM GRANULOCYTES NFR BLD AUTO: 0.3 % (ref 0–0.5)
KETONES UR QL STRIP: ABNORMAL
LEUKOCYTE ESTERASE UR QL STRIP.AUTO: NEGATIVE
LYMPHOCYTES # BLD AUTO: 1.52 10*3/MM3 (ref 0.7–3.1)
LYMPHOCYTES NFR BLD AUTO: 19 % (ref 19.6–45.3)
MAGNESIUM SERPL-MCNC: 2.2 MG/DL (ref 1.6–2.4)
MCH RBC QN AUTO: 31.7 PG (ref 26.6–33)
MCHC RBC AUTO-ENTMCNC: 33.5 G/DL (ref 31.5–35.7)
MCV RBC AUTO: 94.7 FL (ref 79–97)
MONOCYTES # BLD AUTO: 0.66 10*3/MM3 (ref 0.1–0.9)
MONOCYTES NFR BLD AUTO: 8.3 % (ref 5–12)
NEUTROPHILS NFR BLD AUTO: 5.63 10*3/MM3 (ref 1.7–7)
NEUTROPHILS NFR BLD AUTO: 70.5 % (ref 42.7–76)
NITRITE UR QL STRIP: NEGATIVE
NRBC BLD AUTO-RTO: 0 /100 WBC (ref 0–0.2)
PH UR STRIP.AUTO: 7 [PH] (ref 5–8)
PLATELET # BLD AUTO: 293 10*3/MM3 (ref 140–450)
PMV BLD AUTO: 8.4 FL (ref 6–12)
POTASSIUM BLDA-SCNC: 3.9 MMOL/L (ref 3.5–4.9)
POTASSIUM SERPL-SCNC: 4 MMOL/L (ref 3.5–5.2)
PROT SERPL-MCNC: 7.4 G/DL (ref 6–8.5)
PROT UR QL STRIP: NEGATIVE
RBC # BLD AUTO: 4.32 10*6/MM3 (ref 3.77–5.28)
RBC # UR STRIP: ABNORMAL /HPF
REF LAB TEST METHOD: ABNORMAL
SODIUM BLD-SCNC: 133 MMOL/L (ref 138–146)
SODIUM SERPL-SCNC: 133 MMOL/L (ref 136–145)
SP GR UR STRIP: 1.02 (ref 1–1.03)
SQUAMOUS #/AREA URNS HPF: ABNORMAL /HPF
TROPONIN T SERPL HS-MCNC: 12 NG/L
TROPONIN T SERPL HS-MCNC: 13 NG/L
UROBILINOGEN UR QL STRIP: ABNORMAL
WBC # UR STRIP: ABNORMAL /HPF
WBC NRBC COR # BLD: 7.98 10*3/MM3 (ref 3.4–10.8)
WHOLE BLOOD HOLD COAG: NORMAL
WHOLE BLOOD HOLD SPECIMEN: NORMAL

## 2023-09-04 PROCEDURE — 96360 HYDRATION IV INFUSION INIT: CPT

## 2023-09-04 PROCEDURE — 85014 HEMATOCRIT: CPT

## 2023-09-04 PROCEDURE — 70450 CT HEAD/BRAIN W/O DYE: CPT

## 2023-09-04 PROCEDURE — 83735 ASSAY OF MAGNESIUM: CPT | Performed by: EMERGENCY MEDICINE

## 2023-09-04 PROCEDURE — 71045 X-RAY EXAM CHEST 1 VIEW: CPT

## 2023-09-04 PROCEDURE — 84484 ASSAY OF TROPONIN QUANT: CPT | Performed by: EMERGENCY MEDICINE

## 2023-09-04 PROCEDURE — 84484 ASSAY OF TROPONIN QUANT: CPT | Performed by: PHYSICIAN ASSISTANT

## 2023-09-04 PROCEDURE — 80047 BASIC METABLC PNL IONIZED CA: CPT

## 2023-09-04 PROCEDURE — 36415 COLL VENOUS BLD VENIPUNCTURE: CPT

## 2023-09-04 PROCEDURE — 93005 ELECTROCARDIOGRAM TRACING: CPT | Performed by: EMERGENCY MEDICINE

## 2023-09-04 PROCEDURE — 80053 COMPREHEN METABOLIC PANEL: CPT | Performed by: EMERGENCY MEDICINE

## 2023-09-04 PROCEDURE — 81001 URINALYSIS AUTO W/SCOPE: CPT | Performed by: EMERGENCY MEDICINE

## 2023-09-04 PROCEDURE — 85025 COMPLETE CBC W/AUTO DIFF WBC: CPT | Performed by: EMERGENCY MEDICINE

## 2023-09-04 PROCEDURE — 99284 EMERGENCY DEPT VISIT MOD MDM: CPT

## 2023-09-04 RX ORDER — SODIUM CHLORIDE 0.9 % (FLUSH) 0.9 %
10 SYRINGE (ML) INJECTION AS NEEDED
Status: DISCONTINUED | OUTPATIENT
Start: 2023-09-04 | End: 2023-09-04 | Stop reason: HOSPADM

## 2023-09-04 RX ORDER — CEFUROXIME AXETIL 500 MG/1
500 TABLET ORAL 2 TIMES DAILY
Qty: 6 TABLET | Refills: 0 | Status: SHIPPED | OUTPATIENT
Start: 2023-09-05 | End: 2023-09-08

## 2023-09-04 RX ORDER — CEFUROXIME AXETIL 250 MG/1
500 TABLET ORAL ONCE
Status: COMPLETED | OUTPATIENT
Start: 2023-09-04 | End: 2023-09-04

## 2023-09-04 RX ADMIN — CEFUROXIME AXETIL 500 MG: 250 TABLET, FILM COATED ORAL at 19:47

## 2023-09-04 RX ADMIN — SODIUM CHLORIDE 1000 ML: 9 INJECTION, SOLUTION INTRAVENOUS at 16:59

## 2023-09-04 NOTE — ED PROVIDER NOTES
Subjective  History of Present Illness:    Chief Complaint: Weakness, dizziness, nausea, history of abnormal electrolytes and low sodium  History of Present Illness: 63-year-old female presents with weakness, and nausea, affecting her ability to walk at times she reports.  Symptoms started over the last 1 to 2 days.  Significant past medical history for hypothyroidism, Parkinson's disease, and a previous history of hyponatremia, she had similar symptoms in the past when she had hyponatremia so she became concerned.  She denies any fever chills no vomiting, she is eating and drinking she does report some nausea.  Onset: Sudden onset  Duration: Symptoms started in the last 1 to 2 days  Exacerbating / Alleviating factors: As concerned her electrolytes might be abnormal  Associated symptoms: Difficulty with walking      Nurses Notes reviewed and agree, including vitals, allergies, social history and prior medical history.     REVIEW OF SYSTEMS: All systems reviewed and not pertinent unless noted.    Review of Systems   Musculoskeletal:  Positive for gait problem.   Neurological:  Positive for weakness.   All other systems reviewed and are negative.    Past Medical History:   Diagnosis Date    Allergic 2003    One shot every 2 weeks    Anxiety 10/1/2015    Arthritis Dec 2022    75 mg Twice/day    Cancer     Hyperthyroidism Pre 9/1/2013    Parkinson's disease        Allergies:    Codeine and Epinephrine      Past Surgical History:   Procedure Laterality Date    BREAST SURGERY  10/5/2014    lumpectomy    COLONOSCOPY  ??? 2018    Luis Eli MD [facility]         Social History     Socioeconomic History    Marital status:    Tobacco Use    Smoking status: Never     Passive exposure: Never    Smokeless tobacco: Never   Vaping Use    Vaping Use: Never used   Substance and Sexual Activity    Alcohol use: Yes     Alcohol/week: 14.0 standard drinks     Types: 4 Glasses of wine, 5 Shots of liquor, 5 Drinks  "containing 0.5 oz of alcohol per week     Comment: denies x 3 weeks    Drug use: Never    Sexual activity: Not Currently     Partners: Male     Birth control/protection: Post-menopausal         Family History   Problem Relation Age of Onset    Cancer Mother         bladder 2022    Alcohol abuse Father          92    Cancer Maternal Aunt         breast    Stroke Sister         double stroke 2022       Objective  Physical Exam:  /85   Pulse 79   Temp 98.1 °F (36.7 °C) (Oral)   Resp 18   Ht 165.1 cm (65\")   Wt 79.4 kg (175 lb)   SpO2 98%   BMI 29.12 kg/m²      Physical Exam  Vitals and nursing note reviewed.   Constitutional:       Appearance: She is well-developed.   HENT:      Head: Normocephalic.   Cardiovascular:      Rate and Rhythm: Normal rate and regular rhythm.   Pulmonary:      Effort: Pulmonary effort is normal.   Abdominal:      Palpations: Abdomen is soft.   Musculoskeletal:         General: No tenderness or signs of injury. Normal range of motion.      Cervical back: Normal range of motion and neck supple.   Skin:     General: Skin is warm and dry.   Neurological:      General: No focal deficit present.      Mental Status: She is alert and oriented to person, place, and time.      Deep Tendon Reflexes: Reflexes are normal and symmetric.         Procedures    ED Course:    ED Course as of 09/04/23 2111   Mon Sep 04, 2023   164 I updated the patient  on all pending labs and lab results, and all pending radiology and  results and answered all questions.   [CS]   6882 Discussed the repeat troponin with the patient, her heart score is 3.  Shared decision making was discussed, the patient and family would like to go home, she is not having any chest pain, and they will follow-up as an outpatient.  She will be treated for urinary tract infection, she received fluids for her low sodium, and she feels very comfortable at this time. [CS]   1750 HEART Score for Major Cardiac Events - " MDCalc  Calculated on Sep 04 2023 5:56 PM  3 points -> Low Score (0-3 points) Risk of MACE of 0.9-1.7%.  If troponin is positive, many experts recommend further workup and admission even with a low HEART Score. [CS]   1808 Patient reports she is having dizziness when she stands and went to the bathroom, will get a CT scan of her head before discharge [CS]      ED Course User Index  [CS] Henry Jimenez Jr., NORMA       Lab Results (last 24 hours)       Procedure Component Value Units Date/Time    CBC & Differential [[id number]]  (Abnormal) Collected: 09/04/23 1458    Specimen: Blood Updated: 09/04/23 1512    Narrative:      The following orders were created for panel order CBC & Differential.  Procedure                               Abnormality         Status                     ---------                               -----------         ------                     CBC Auto Differential[[id number]]        Abnormal            Final result                 Please view results for these tests on the individual orders.    Comprehensive Metabolic Panel [[id number]]  (Abnormal) Collected: 09/04/23 1458    Specimen: Blood Updated: 09/04/23 1543     Glucose 106 mg/dL      BUN 17 mg/dL      Creatinine 0.83 mg/dL      Sodium 133 mmol/L      Potassium 4.0 mmol/L      Comment: Slight hemolysis detected by analyzer. Results may be affected.        Chloride 95 mmol/L      CO2 27.0 mmol/L      Calcium 9.3 mg/dL      Total Protein 7.4 g/dL      Albumin 4.5 g/dL      ALT (SGPT) <5 U/L      AST (SGOT) 25 U/L      Alkaline Phosphatase 83 U/L      Total Bilirubin 0.4 mg/dL      Globulin 2.9 gm/dL      Comment: Calculated Result        A/G Ratio 1.6 g/dL      BUN/Creatinine Ratio 20.5     Anion Gap 11.0 mmol/L      eGFR 78.8 mL/min/1.73     Narrative:      GFR Normal >60  Chronic Kidney Disease <60  Kidney Failure <15      Single High Sensitivity Troponin T [[id number]]  (Abnormal) Collected: 09/04/23 1458    Specimen: Blood Updated:  09/04/23 1532     HS Troponin T 13 ng/L     Narrative:      High Sensitive Troponin T Reference Range:  <10.0 ng/L- Negative Female for AMI  <15.0 ng/L- Negative Male for AMI  >=10 - Abnormal Female indicating possible myocardial injury.  >=15 - Abnormal Male indicating possible myocardial injury.   Clinicians would have to utilize clinical acumen, EKG, Troponin, and serial changes to determine if it is an Acute Myocardial Infarction or myocardial injury due to an underlying chronic condition.         Magnesium [[id number]]  (Normal) Collected: 09/04/23 1458    Specimen: Blood Updated: 09/04/23 1533     Magnesium 2.2 mg/dL     CBC Auto Differential [[id number]]  (Abnormal) Collected: 09/04/23 1458    Specimen: Blood Updated: 09/04/23 1512     WBC 7.98 10*3/mm3      RBC 4.32 10*6/mm3      Hemoglobin 13.7 g/dL      Hematocrit 40.9 %      MCV 94.7 fL      MCH 31.7 pg      MCHC 33.5 g/dL      RDW 12.2 %      RDW-SD 43.1 fl      MPV 8.4 fL      Platelets 293 10*3/mm3      Neutrophil % 70.5 %      Lymphocyte % 19.0 %      Monocyte % 8.3 %      Eosinophil % 1.4 %      Basophil % 0.5 %      Immature Grans % 0.3 %      Neutrophils, Absolute 5.63 10*3/mm3      Lymphocytes, Absolute 1.52 10*3/mm3      Monocytes, Absolute 0.66 10*3/mm3      Eosinophils, Absolute 0.11 10*3/mm3      Basophils, Absolute 0.04 10*3/mm3      Immature Grans, Absolute 0.02 10*3/mm3      nRBC 0.0 /100 WBC     POC Chem 8 [[id number]]  (Abnormal) Resulted: 09/04/23 1507    Specimen: Blood Updated: 09/04/23 1508     Sodium 133 mmol/L      POC Potassium 3.9 mmol/L      Glucose 104 mg/dL      BUN 18 mg/dL      Creatinine 0.80 mg/dL      Hemoglobin 13.9 g/dL      Hematocrit 41 %     Urinalysis With Microscopic If Indicated (No Culture) - Urine, Clean Catch [[id number]]  (Abnormal) Collected: 09/04/23 1553    Specimen: Urine, Clean Catch Updated: 09/04/23 1603     Color, UA Yellow     Appearance, UA Cloudy     pH, UA 7.0     Specific Gravity, UA 1.022     Glucose,  UA Negative     Ketones, UA Trace     Bilirubin, UA Negative     Blood, UA Negative     Protein, UA Negative     Leuk Esterase, UA Negative     Nitrite, UA Negative     Urobilinogen, UA 0.2 E.U./dL    Urinalysis, Microscopic Only - Urine, Clean Catch [[id number]]  (Abnormal) Collected: 09/04/23 1553    Specimen: Urine, Clean Catch Updated: 09/04/23 1603     RBC, UA 3-6 /HPF      WBC, UA 3-5 /HPF      Bacteria, UA None Seen /HPF      Squamous Epithelial Cells, UA 7-12 /HPF      Hyaline Casts, UA 0-6 /LPF      Methodology Automated Microscopy    Single High Sensitivity Troponin T [[id number]]  (Abnormal) Collected: 09/04/23 1703    Specimen: Blood Updated: 09/04/23 1729     HS Troponin T 12 ng/L     Narrative:      High Sensitive Troponin T Reference Range:  <10.0 ng/L- Negative Female for AMI  <15.0 ng/L- Negative Male for AMI  >=10 - Abnormal Female indicating possible myocardial injury.  >=15 - Abnormal Male indicating possible myocardial injury.   Clinicians would have to utilize clinical acumen, EKG, Troponin, and serial changes to determine if it is an Acute Myocardial Infarction or myocardial injury due to an underlying chronic condition.                  CT Head Without Contrast    Result Date: 9/4/2023  CT HEAD WO CONTRAST Date of Exam: 9/4/2023 5:43 PM CDT Indication: dizzy. Comparison: None available. Technique: Axial CT images were obtained of the head without contrast administration.  Automated exposure control and iterative construction methods were used. Findings: There is no evidence of acute infarction. There there is no acute intracranial hemorrhage. There are no extra-axial collections. Ventricles and CSF spaces are symmetric. No mass effect nor hydrocephalus. Brain parenchyma appears normal for age.  Paranasal sinuses and mastoid air cells are adequately aerated.  Osseous structures and orbits appear intact.     Impression: Impression: 1.No acute intracranial process identified. Electronically  Signed: Keith Rinaldi MD  9/4/2023 5:58 PM CDT  Workstation ID: [id number]    XR Chest 1 View    Result Date: 9/4/2023  XR CHEST 1 VW Date of Exam: 9/4/2023 2:19 PM CDT Indication: Weak/Dizzy/AMS triage protocol Comparison: 7/3/2023 Findings: Cardiomediastinal silhouette is unremarkable.  No airspace disease, pneumothorax, nor pleural effusion.No acute osseous abnormality identified.     Impression: Impression: No acute process identified Electronically Signed: Keith Rinaldi MD  9/4/2023 2:29 PM CDT  Workstation ID: [id number]        Medical Decision Making  Patient Presentation presents 63-year-old female presents with weakness, dizziness, history of electrolyte abnormalities, initial assessment, her vital signs are stable, she was in no acute distress.    DDX hyponatremia, acute kidney insufficiency, UTI, intracranial abnormality, electrolyte abnormalities, hypokalemia, hypocalcemia.    Data Review/Analysis/Ordering unique tests reviewed and summarized previous medical records including records from [facility] and [facility] for symptoms of dizziness and Parkinson's.  Labs ordered including CBC, CMP, urinalysis, troponin, CT scan of the head.    Independent Review Studies independently reviewed labs including CBC, CMP, urinalysis, and troponin and discussed results of the CT head and x-ray with the patient.    Intervention/Re-evaluation intervention included IV fluids, which she showed improvement.    Independent Clinician consultation with my supervising physician Dr. Cristobal    Risk Stratification tools/clinical decision rules patient has a history of Parkinson's, she was recently admitted to the hospital for altered mental status, Parkinson's, and hypertension.  She presented with dizziness, that she has had intermittently and weakness, she had symptoms consistent with concern for urinary tract infection she also was concerned about hyponatremia her sodium was 133 which was  consistent with her recent labs.  She did receive IV fluids.  He also had initial elevation of her troponin repeat was not changed by more than 4, she did not complain of any chest pain    Shared Decision Making discussed the results including the urinalysis and her symptoms, weakness and dizziness, and the elevated troponin, she has a heart score of 3, with no chest pain, will treat with antibiotics, CT scan unremarkable, the patient and family feel comfortable going home, will follow up with primary care physician and return if symptoms worsen    Disposition patient is stable, will be discharged home.    Problems Addressed:  Acute cystitis without hematuria: complicated acute illness or injury  Dizziness: complicated acute illness or injury    Amount and/or Complexity of Data Reviewed  Labs: ordered. Decision-making details documented in ED Course.  Radiology: ordered.  ECG/medicine tests: ordered.    Risk  Prescription drug management.          Final diagnoses:   Dizziness   Acute cystitis without hematuria          Henry Jimenez Jr., PATASHIA  09/04/23 3303

## 2023-09-05 LAB
BUN BLDA-MCNC: 18 MG/DL (ref 8–26)
CA-I BLDA-SCNC: 1.17 MMOL/L (ref 1.2–1.32)
CHLORIDE BLDA-SCNC: 97 MMOL/L (ref 98–109)
CO2 BLDA-SCNC: 26 MMOL/L (ref 24–29)
CREAT BLDA-MCNC: 0.8 MG/DL (ref 0.6–1.3)
EGFRCR SERPLBLD CKD-EPI 2021: 82.4 ML/MIN/1.73
GLUCOSE BLDC GLUCOMTR-MCNC: 104 MG/DL (ref 70–130)
HCT VFR BLDA CALC: 41 % (ref 38–51)
HGB BLDA-MCNC: 13.9 G/DL (ref 12–17)
POTASSIUM BLDA-SCNC: 3.9 MMOL/L (ref 3.5–4.9)
QT INTERVAL: 362 MS
QTC INTERVAL: 442 MS
SODIUM BLD-SCNC: 133 MMOL/L (ref 138–146)

## 2023-09-21 DIAGNOSIS — K59.04 CHRONIC IDIOPATHIC CONSTIPATION: ICD-10-CM

## 2023-09-21 RX ORDER — LUBIPROSTONE 8 UG/1
CAPSULE ORAL
Qty: 60 CAPSULE | Refills: 2 | Status: SHIPPED | OUTPATIENT
Start: 2023-09-21

## 2023-09-21 NOTE — TELEPHONE ENCOUNTER
Rx Refill Note  Requested Prescriptions     Pending Prescriptions Disp Refills    lubiprostone (AMITIZA) 8 MCG capsule [Pharmacy Med Name: LUBIPROSTONE 8 MCG CAPS 8 Capsule] 60 capsule 2     Sig: TAKE 1 CAPSULE BY MOUTH 2 TIMES A DAY WITH MEALS.      Last office visit with prescribing clinician: 7/12/2023   Last telemedicine visit with prescribing clinician: Visit date not found   Next office visit with prescribing clinician: 5/3/2024     Alie Valencia Rep  09/21/23, 15:56 EDT    Rx sent

## 2024-02-08 ENCOUNTER — OFFICE VISIT (OUTPATIENT)
Dept: FAMILY MEDICINE CLINIC | Facility: CLINIC | Age: 64
End: 2024-02-08
Payer: COMMERCIAL

## 2024-02-08 ENCOUNTER — LAB (OUTPATIENT)
Dept: LAB | Facility: HOSPITAL | Age: 64
End: 2024-02-08
Payer: COMMERCIAL

## 2024-02-08 VITALS
SYSTOLIC BLOOD PRESSURE: 132 MMHG | HEIGHT: 65 IN | OXYGEN SATURATION: 97 % | WEIGHT: 159.4 LBS | HEART RATE: 85 BPM | BODY MASS INDEX: 26.56 KG/M2 | DIASTOLIC BLOOD PRESSURE: 82 MMHG

## 2024-02-08 DIAGNOSIS — E87.1 HYPONATREMIA: ICD-10-CM

## 2024-02-08 DIAGNOSIS — K59.04 CHRONIC IDIOPATHIC CONSTIPATION: ICD-10-CM

## 2024-02-08 DIAGNOSIS — E78.2 MIXED HYPERLIPIDEMIA: Primary | ICD-10-CM

## 2024-02-08 DIAGNOSIS — F41.9 ANXIETY: ICD-10-CM

## 2024-02-08 PROCEDURE — 80053 COMPREHEN METABOLIC PANEL: CPT

## 2024-02-08 PROCEDURE — 84550 ASSAY OF BLOOD/URIC ACID: CPT

## 2024-02-08 PROCEDURE — 36415 COLL VENOUS BLD VENIPUNCTURE: CPT

## 2024-02-08 PROCEDURE — 84443 ASSAY THYROID STIM HORMONE: CPT

## 2024-02-08 NOTE — ASSESSMENT & PLAN NOTE
Chronic recurring issue, I wonder if it is her dual serotonin agents duloxetine and Lexapro.  Will evaluate with laboratory indices.  Will consider weaning down Lexapro and duloxetine to minimize.  SIADH is a known side effect of these agents.

## 2024-02-08 NOTE — PROGRESS NOTES
"Carolina Duarte  1960  [id number]  Patient Care Team:  Janes Sanchez MD as PCP - General (Internal Medicine)    Carolina Duarte is a 63 y.o. female here today for follow up.     This patient is accompanied by their self who contributes to the history of their care.    Chief Complaint:    Chief Complaint   Patient presents with    Allergies        History of Present Illness:  I have reviewed and/or updated the patient's past medical, past surgical, family, social history, problem list and allergies as appropriate.     This lady is here to establish intraoffice transfer.  She has underlying Parkinson's disease for which she sees Dr. Everman [place].  She is currently on carbidopa levodopa.  She also has generalized anxiety disorder for which she takes Lexapro 20 mg daily.  She suffers with chronic constipation and has been on Amitiza.  Previously was on Linzess.  She currently takes Lipitor 80 mg daily intensive dose statin treatment for hyperlipidemia.  She is tolerating this without muscle aches or dark-colored urine.    Review of Systems    Vitals:    02/08/24 0926   BP: 132/82   Pulse: 85   SpO2: 97%   Weight: 72.3 kg (159 lb 6.4 oz)   Height: 165.1 cm (65\")     Body mass index is 26.53 kg/m².    Physical Exam    Procedures    Results Review:    {Results Review:43074::\"I reviewed the patient's new clinical results.\"}    Assessment/Plan:    Problem List Items Addressed This Visit    None      Plan of care reviewed with patient at the conclusion of today's visit. Education was provided regarding diagnosis and management.  Patient verbalizes understanding of and agreement with management plan.    No follow-ups on file.    Janes Sanchez MD      Please note than portions of this note were completed [facility] a Voice Recognition Program          "

## 2024-02-08 NOTE — PROGRESS NOTES
Carolina Duarte  1960  [id number]  Patient Care Team:  Janes Sanchez MD as PCP - General (Internal Medicine)    Carolina Duarte is a 63 y.o. female here today to establish care.  This patient is accompanied by their self who contributes to the history of their care.    Chief Complaint:    Chief Complaint   Patient presents with    Allergies        History of Present Illness:   This lady is here to establish  in transfer from Dr. Schmidt at [facility]..  She has underlying Parkinson's disease for which she sees Dr. Everman [place].  She is currently on carbidopa levodopa.  She also has generalized anxiety disorder for which she takes Lexapro 20 mg daily.  She suffers with chronic constipation and has been on Amitiza.  Previously was on Linzess.  She currently takes Lipitor 80 mg daily intensive dose statin treatment for hyperlipidemia.  She is tolerating this without muscle aches or dark-colored urine.    She has a history of hyponatremia.  She has been hospitalized for this in the past. She is currently taking gatorade zreo and water. They are not monitoring her s fluid balance. She has not been hospitalized recently for this. She currently is taking lexapro and duloxetine both prescribed by neurology.  They tell me it was low with recent visit with Dr. Schmidt.          Past Medical History:   Diagnosis Date    Allergic     One shot every 2 weeks    Anxiety 10/1/2015    Arthritis Dec 2022    75 mg Twice/day    Cancer     Hyperthyroidism Pre 2013    Parkinson's disease        Past Surgical History:   Procedure Laterality Date    BREAST SURGERY  10/5/2014    lumpectomy    COLONOSCOPY  ??? 2018    Luis Eli MD twice [facility]        Family History   Problem Relation Age of Onset    Cancer Mother         bladder 2022    Alcohol abuse Father          92    Cancer Maternal Aunt         breast    Stroke Sister         double stroke 2022       Social History     Socioeconomic  "History    Marital status:    Tobacco Use    Smoking status: Never     Passive exposure: Never    Smokeless tobacco: Never   Vaping Use    Vaping Use: Never used   Substance and Sexual Activity    Alcohol use: Yes     Alcohol/week: 14.0 standard drinks of alcohol     Types: 4 Glasses of wine, 5 Shots of liquor, 5 Drinks containing 0.5 oz of alcohol per week     Comment: denies x 3 weeks    Drug use: Never    Sexual activity: Not Currently     Partners: Male     Birth control/protection: Post-menopausal       Allergies   Allergen Reactions    Codeine Palpitations    Epinephrine Dizziness and Other (See Comments)       Review of Systems:    Review of Systems   Constitutional:  Positive for fatigue.   Respiratory: Negative.     Gastrointestinal: Negative.    Endocrine: Negative.    Neurological:  Positive for tremors.   Psychiatric/Behavioral:  The patient is nervous/anxious.        Vitals:    02/08/24 0926   BP: 132/82   Pulse: 85   SpO2: 97%   Weight: 72.3 kg (159 lb 6.4 oz)   Height: 165.1 cm (65\")     Body mass index is 26.53 kg/m².      Current Outpatient Medications:     atorvastatin (LIPITOR) 80 MG tablet, Take 1 tablet by mouth Every Night., Disp: 30 tablet, Rfl: 0    Biotin 1 MG capsule, Take 1 capsule by mouth Daily. OTC, Disp: , Rfl:     Calcium Carb-Cholecalciferol 1000-20 MG-MCG tablet, Take 1 tablet by mouth Daily. OTC, Disp: , Rfl:     Cholecalciferol 50 MCG (2000 UT) capsule, Take 1 capsule by mouth Daily. OTC, Disp: , Rfl:     cyanocobalamin (VITAMIN B-12N) 50 MCG tablet, Take 1 tablet by mouth Daily. OTC, Disp: , Rfl:     DICLOFENAC PO, Take 75 mg by mouth 2 (Two) Times a Day., Disp: , Rfl:     DULoxetine (CYMBALTA) 30 MG capsule, Take 1 capsule by mouth Daily. Takes with 60 mg for total of 90 mg daily, Disp: , Rfl:     DULoxetine (CYMBALTA) 60 MG capsule, Take 1 capsule by mouth Daily. Takes with 30 mg for total of 90 mg daily, Disp: , Rfl:     escitalopram (LEXAPRO) 20 MG tablet, Take 1 " tablet by mouth Daily., Disp: , Rfl:     levothyroxine (SYNTHROID, LEVOTHROID) 75 MCG tablet, Take 1 tablet by mouth Every Morning., Disp: , Rfl:     lubiprostone (AMITIZA) 8 MCG capsule, TAKE 1 CAPSULE BY MOUTH 2 TIMES A DAY WITH MEALS., Disp: 60 capsule, Rfl: 2    multivitamin with minerals tablet tablet, Take 1 tablet by mouth Daily. OTC, Disp: , Rfl:     carbidopa-levodopa (SINEMET)  MG per tablet, Take 1.5 tablets by mouth 4 (Four) Times a Day Before Meals & at Bedtime for 30 days., Disp: 180 tablet, Rfl: 0    Physical Exam:    Physical Exam  Vitals and nursing note reviewed.   Constitutional:       General: She is not in acute distress.     Appearance: She is well-developed. She is not diaphoretic.   HENT:      Head: Normocephalic and atraumatic.      Right Ear: Tympanic membrane and external ear normal.      Left Ear: Tympanic membrane and external ear normal.      Mouth/Throat:      Mouth: Mucous membranes are moist.      Pharynx: No oropharyngeal exudate.   Eyes:      General: No scleral icterus.        Right eye: No discharge.      Conjunctiva/sclera: Conjunctivae normal.   Neck:      Thyroid: No thyromegaly.      Vascular: No JVD.      Trachea: No tracheal deviation.   Cardiovascular:      Rate and Rhythm: Normal rate and regular rhythm.      Heart sounds: Normal heart sounds.      Comments: PMI nondisplaced  Pulmonary:      Effort: Pulmonary effort is normal.      Breath sounds: Normal breath sounds. No wheezing or rales.   Abdominal:      General: Bowel sounds are normal.      Palpations: Abdomen is soft.      Tenderness: There is no abdominal tenderness. There is no guarding or rebound.   Musculoskeletal:      Cervical back: Normal range of motion and neck supple.   Lymphadenopathy:      Cervical: No cervical adenopathy.   Skin:     General: Skin is warm and dry.      Capillary Refill: Capillary refill takes less than 2 seconds.      Coloration: Skin is not pale.      Findings: No rash.    Neurological:      Mental Status: She is alert and oriented to person, place, and time.      Motor: No abnormal muscle tone.      Coordination: Coordination normal.      Gait: Gait abnormal.      Comments: Tremor present, flat affect. bradykinesia   Psychiatric:         Judgment: Judgment normal.         Procedures    Results Review:    None    Assessment/Plan:    Problem List Items Addressed This Visit       Hyponatremia    Current Assessment & Plan     Chronic recurring issue, I wonder if it is her dual serotonin agents duloxetine and Lexapro.  Will evaluate with laboratory indices.  Will consider weaning down Lexapro and duloxetine to minimize.  SIADH is a known side effect of these agents.         Relevant Orders    Comprehensive Metabolic Panel    Uric acid    TSH Rfx On Abnormal To Free T4    Hyperlipidemia - Primary    Relevant Medications    atorvastatin (LIPITOR) 80 MG tablet    Chronic idiopathic constipation    Current Assessment & Plan     Currently controlled on Amitiza 8 mcg daily.  Continue hydration         Relevant Medications    lubiprostone (AMITIZA) 8 MCG capsule    Anxiety       Plan of care reviewed with patient at the conclusion of today's visit. Education was provided regarding diagnosis and management.  Patient verbalizes understanding of and agreement with management plan.    Return in about 3 months (around 5/8/2024) for hyponatremaia/anxiety/lipids.    Janes Sanchez MD      Please note than portions of this note were completed [facility] a Voice Recognition Program

## 2024-02-09 LAB
ALBUMIN SERPL-MCNC: 4.8 G/DL (ref 3.5–5.2)
ALBUMIN/GLOB SERPL: 2.4 G/DL
ALP SERPL-CCNC: 73 U/L (ref 39–117)
ALT SERPL W P-5'-P-CCNC: <5 U/L (ref 1–33)
ANION GAP SERPL CALCULATED.3IONS-SCNC: 9 MMOL/L (ref 5–15)
AST SERPL-CCNC: 19 U/L (ref 1–32)
BILIRUB SERPL-MCNC: 0.5 MG/DL (ref 0–1.2)
BUN SERPL-MCNC: 15 MG/DL (ref 8–23)
BUN/CREAT SERPL: 18.1 (ref 7–25)
CALCIUM SPEC-SCNC: 9.3 MG/DL (ref 8.6–10.5)
CHLORIDE SERPL-SCNC: 98 MMOL/L (ref 98–107)
CO2 SERPL-SCNC: 27 MMOL/L (ref 22–29)
CREAT SERPL-MCNC: 0.83 MG/DL (ref 0.57–1)
EGFRCR SERPLBLD CKD-EPI 2021: 78.8 ML/MIN/1.73
GLOBULIN UR ELPH-MCNC: 2 GM/DL
GLUCOSE SERPL-MCNC: 92 MG/DL (ref 65–99)
POTASSIUM SERPL-SCNC: 4.4 MMOL/L (ref 3.5–5.2)
PROT SERPL-MCNC: 6.8 G/DL (ref 6–8.5)
SODIUM SERPL-SCNC: 134 MMOL/L (ref 136–145)
TSH SERPL DL<=0.05 MIU/L-ACNC: 1.98 UIU/ML (ref 0.27–4.2)
URATE SERPL-MCNC: 3 MG/DL (ref 2.4–5.7)

## 2024-02-09 NOTE — PROGRESS NOTES
Her sodium level is now near normal.  I would not change anything given the normalcy I would not change her antidepressants however can consider this in the future.  She can should continue consuming fluids as she is currently doing so.

## 2024-02-15 ENCOUNTER — PATIENT ROUNDING (BHMG ONLY) (OUTPATIENT)
Dept: FAMILY MEDICINE CLINIC | Facility: CLINIC | Age: 64
End: 2024-02-15
Payer: COMMERCIAL

## 2024-02-25 ENCOUNTER — APPOINTMENT (OUTPATIENT)
Dept: GENERAL RADIOLOGY | Facility: HOSPITAL | Age: 64
End: 2024-02-25
Payer: COMMERCIAL

## 2024-02-25 ENCOUNTER — HOSPITAL ENCOUNTER (EMERGENCY)
Facility: HOSPITAL | Age: 64
Discharge: HOME | End: 2024-02-25
Attending: EMERGENCY MEDICINE | Admitting: EMERGENCY MEDICINE
Payer: COMMERCIAL

## 2024-02-25 VITALS
TEMPERATURE: 97.5 F | WEIGHT: 157 LBS | DIASTOLIC BLOOD PRESSURE: 95 MMHG | HEIGHT: 66 IN | SYSTOLIC BLOOD PRESSURE: 176 MMHG | HEART RATE: 68 BPM | RESPIRATION RATE: 16 BRPM | OXYGEN SATURATION: 100 % | BODY MASS INDEX: 25.23 KG/M2

## 2024-02-25 DIAGNOSIS — R53.1 GENERALIZED WEAKNESS: Primary | ICD-10-CM

## 2024-02-25 DIAGNOSIS — R41.0 DISORIENTED: ICD-10-CM

## 2024-02-25 DIAGNOSIS — Z86.69 HISTORY OF PARKINSON'S DISEASE: ICD-10-CM

## 2024-02-25 LAB
ALBUMIN SERPL-MCNC: 4.4 G/DL (ref 3.5–5.2)
ALBUMIN/GLOB SERPL: 1.6 G/DL
ALP SERPL-CCNC: 77 U/L (ref 39–117)
ALT SERPL W P-5'-P-CCNC: <5 U/L (ref 1–33)
ANION GAP SERPL CALCULATED.3IONS-SCNC: 9 MMOL/L (ref 5–15)
AST SERPL-CCNC: 16 U/L (ref 1–32)
BASOPHILS # BLD AUTO: 0.03 10*3/MM3 (ref 0–0.2)
BASOPHILS NFR BLD AUTO: 0.4 % (ref 0–1.5)
BILIRUB SERPL-MCNC: 0.5 MG/DL (ref 0–1.2)
BILIRUB UR QL STRIP: NEGATIVE
BUN SERPL-MCNC: 13 MG/DL (ref 8–23)
BUN/CREAT SERPL: 18.8 (ref 7–25)
CALCIUM SPEC-SCNC: 9.1 MG/DL (ref 8.6–10.5)
CHLORIDE SERPL-SCNC: 99 MMOL/L (ref 98–107)
CLARITY UR: CLEAR
CO2 SERPL-SCNC: 28 MMOL/L (ref 22–29)
COLOR UR: YELLOW
CREAT SERPL-MCNC: 0.69 MG/DL (ref 0.57–1)
DEPRECATED RDW RBC AUTO: 44.6 FL (ref 37–54)
EGFRCR SERPLBLD CKD-EPI 2021: 97.1 ML/MIN/1.73
EOSINOPHIL # BLD AUTO: 0.01 10*3/MM3 (ref 0–0.4)
EOSINOPHIL NFR BLD AUTO: 0.1 % (ref 0.3–6.2)
ERYTHROCYTE [DISTWIDTH] IN BLOOD BY AUTOMATED COUNT: 12.8 % (ref 12.3–15.4)
FLUAV RNA RESP QL NAA+PROBE: NOT DETECTED
FLUBV RNA RESP QL NAA+PROBE: NOT DETECTED
GLOBULIN UR ELPH-MCNC: 2.8 GM/DL
GLUCOSE SERPL-MCNC: 104 MG/DL (ref 65–99)
GLUCOSE UR STRIP-MCNC: NEGATIVE MG/DL
HCT VFR BLD AUTO: 40.7 % (ref 34–46.6)
HGB BLD-MCNC: 13.7 G/DL (ref 12–15.9)
HGB UR QL STRIP.AUTO: NEGATIVE
IMM GRANULOCYTES # BLD AUTO: 0.01 10*3/MM3 (ref 0–0.05)
IMM GRANULOCYTES NFR BLD AUTO: 0.1 % (ref 0–0.5)
KETONES UR QL STRIP: NEGATIVE
LEUKOCYTE ESTERASE UR QL STRIP.AUTO: NEGATIVE
LYMPHOCYTES # BLD AUTO: 1.35 10*3/MM3 (ref 0.7–3.1)
LYMPHOCYTES NFR BLD AUTO: 19.6 % (ref 19.6–45.3)
MCH RBC QN AUTO: 32.1 PG (ref 26.6–33)
MCHC RBC AUTO-ENTMCNC: 33.7 G/DL (ref 31.5–35.7)
MCV RBC AUTO: 95.3 FL (ref 79–97)
MONOCYTES # BLD AUTO: 0.52 10*3/MM3 (ref 0.1–0.9)
MONOCYTES NFR BLD AUTO: 7.5 % (ref 5–12)
NEUTROPHILS NFR BLD AUTO: 4.97 10*3/MM3 (ref 1.7–7)
NEUTROPHILS NFR BLD AUTO: 72.3 % (ref 42.7–76)
NITRITE UR QL STRIP: NEGATIVE
NRBC BLD AUTO-RTO: 0 /100 WBC (ref 0–0.2)
PH UR STRIP.AUTO: 6.5 [PH] (ref 5–8)
PLATELET # BLD AUTO: 263 10*3/MM3 (ref 140–450)
PMV BLD AUTO: 8.8 FL (ref 6–12)
POTASSIUM SERPL-SCNC: 3.8 MMOL/L (ref 3.5–5.2)
PROT SERPL-MCNC: 7.2 G/DL (ref 6–8.5)
PROT UR QL STRIP: NEGATIVE
QT INTERVAL: 382 MS
QTC INTERVAL: 432 MS
RBC # BLD AUTO: 4.27 10*6/MM3 (ref 3.77–5.28)
SARS-COV-2 RNA RESP QL NAA+PROBE: NOT DETECTED
SODIUM SERPL-SCNC: 136 MMOL/L (ref 136–145)
SP GR UR STRIP: 1.02 (ref 1–1.03)
T4 FREE SERPL-MCNC: 1.42 NG/DL (ref 0.93–1.7)
TROPONIN T SERPL HS-MCNC: 9 NG/L
TSH SERPL DL<=0.05 MIU/L-ACNC: 0.87 UIU/ML (ref 0.27–4.2)
UROBILINOGEN UR QL STRIP: NORMAL
WBC NRBC COR # BLD AUTO: 6.89 10*3/MM3 (ref 3.4–10.8)

## 2024-02-25 PROCEDURE — 93005 ELECTROCARDIOGRAM TRACING: CPT | Performed by: EMERGENCY MEDICINE

## 2024-02-25 PROCEDURE — 71045 X-RAY EXAM CHEST 1 VIEW: CPT

## 2024-02-25 PROCEDURE — 87636 SARSCOV2 & INF A&B AMP PRB: CPT | Performed by: EMERGENCY MEDICINE

## 2024-02-25 PROCEDURE — 84484 ASSAY OF TROPONIN QUANT: CPT | Performed by: EMERGENCY MEDICINE

## 2024-02-25 PROCEDURE — 84439 ASSAY OF FREE THYROXINE: CPT | Performed by: EMERGENCY MEDICINE

## 2024-02-25 PROCEDURE — P9612 CATHETERIZE FOR URINE SPEC: HCPCS

## 2024-02-25 PROCEDURE — 80050 GENERAL HEALTH PANEL: CPT | Performed by: EMERGENCY MEDICINE

## 2024-02-25 PROCEDURE — 99284 EMERGENCY DEPT VISIT MOD MDM: CPT

## 2024-02-25 PROCEDURE — 81003 URINALYSIS AUTO W/O SCOPE: CPT | Performed by: EMERGENCY MEDICINE

## 2024-02-25 NOTE — ED PROVIDER NOTES
[place]    EMERGENCY DEPARTMENT ENCOUNTER      Pt Name: Carolina Duarte  MRN: [id number]  YOB: 1960  Date of evaluation: 2/25/2024  Provider: Max Manning MD    CHIEF COMPLAINT       Chief Complaint   Patient presents with    Fatigue         HISTORY OF PRESENT ILLNESS   Carolina Duarte is a 63 y.o. female who presents to the emergency department with complaint of fatigue that began yesterday.  Per report from  at bedside, she has a history of hyponatremia which is caused her to have similar symptoms.  She seems slightly disoriented this morning as well which has also been present in the past.  She has a history of Parkinson's.  Patient denies any current specific symptoms including any headache, chest pain, shortness of breath, cough, fever, chills, abdominal pain, vomiting, diarrhea, or urinary symptoms.      Nursing notes were reviewed.    REVIEW OF SYSTEMS     ROS:  A chief complaint appropriate review of systems was completed and is negative except as noted in the HPI.      PAST MEDICAL HISTORY     Past Medical History:   Diagnosis Date    Allergic 2003    One shot every 2 weeks    Anxiety 10/1/2015    Arthritis Dec 2022    75 mg Twice/day    Cancer     Hyperthyroidism Pre 9/1/2013    Parkinson's disease          SURGICAL HISTORY       Past Surgical History:   Procedure Laterality Date    BREAST SURGERY  10/5/2014    lumpectomy    COLONOSCOPY  ??? 2018    Luis Eli MD [facility]         CURRENT MEDICATIONS     No current facility-administered medications for this encounter.    Current Outpatient Medications:     atorvastatin (LIPITOR) 80 MG tablet, Take 1 tablet by mouth Every Night., Disp: 30 tablet, Rfl: 0    Biotin 1 MG capsule, Take 1 capsule by mouth Daily. OTC, Disp: , Rfl:     Calcium Carb-Cholecalciferol 1000-20 MG-MCG tablet, Take 1 tablet by mouth Daily. OTC, Disp: , Rfl:     carbidopa-levodopa (SINEMET)  MG per tablet, Take 1.5 tablets by mouth 4 (Four)  Times a Day Before Meals & at Bedtime for 30 days., Disp: 180 tablet, Rfl: 0    Cholecalciferol 50 MCG (2000 UT) capsule, Take 1 capsule by mouth Daily. OTC, Disp: , Rfl:     cyanocobalamin (VITAMIN B-12N) 50 MCG tablet, Take 1 tablet by mouth Daily. OTC, Disp: , Rfl:     DICLOFENAC PO, Take 75 mg by mouth 2 (Two) Times a Day., Disp: , Rfl:     DULoxetine (CYMBALTA) 30 MG capsule, Take 1 capsule by mouth Daily. Takes with 60 mg for total of 90 mg daily, Disp: , Rfl:     DULoxetine (CYMBALTA) 60 MG capsule, Take 1 capsule by mouth Daily. Takes with 30 mg for total of 90 mg daily, Disp: , Rfl:     escitalopram (LEXAPRO) 20 MG tablet, Take 1 tablet by mouth Daily., Disp: , Rfl:     levothyroxine (SYNTHROID, LEVOTHROID) 75 MCG tablet, Take 1 tablet by mouth Every Morning., Disp: , Rfl:     lubiprostone (AMITIZA) 8 MCG capsule, TAKE 1 CAPSULE BY MOUTH 2 TIMES A DAY WITH MEALS., Disp: 60 capsule, Rfl: 2    multivitamin with minerals tablet tablet, Take 1 tablet by mouth Daily. OTC, Disp: , Rfl:     ALLERGIES     Codeine and Epinephrine    FAMILY HISTORY       Family History   Problem Relation Age of Onset    Cancer Mother         bladder 2022    Alcohol abuse Father          92    Cancer Maternal Aunt         breast    Stroke Sister         double stroke 2022          SOCIAL HISTORY       Social History     Socioeconomic History    Marital status:    Tobacco Use    Smoking status: Never     Passive exposure: Never    Smokeless tobacco: Never   Vaping Use    Vaping Use: Never used   Substance and Sexual Activity    Alcohol use: Yes     Alcohol/week: 14.0 standard drinks of alcohol     Types: 4 Glasses of wine, 5 Shots of liquor, 5 Drinks containing 0.5 oz of alcohol per week     Comment: denies x 3 weeks    Drug use: Never    Sexual activity: Not Currently     Partners: Male     Birth control/protection: Post-menopausal         PHYSICAL EXAM    (up to 7 for level 4, 8 or more for level 5)      Vitals:    02/25/24 1331 02/25/24 1405 02/25/24 1406 02/25/24 1430   BP:  169/95  176/95   BP Location:       Patient Position:       Pulse: 69  78 68   Resp:       Temp:       TempSrc:       SpO2: 100%  99% 100%   Weight:       Height:           General: Awake, alert, no acute distress.  HEENT: Conjunctivae normal.  Neck: Trachea midline.  Cardiac: Heart regular rate, rhythm, no murmurs, rubs, or gallops  Lungs: Lungs are clear to auscultation, there is no wheezing, rhonchi, or rales. There is no use of accessory muscles.  Chest wall: There is no tenderness to palpation over the chest wall or over ribs  Abdomen: Abdomen is soft, nontender, nondistended. There are no firm or pulsatile masses, no rebound rigidity or guarding.   Musculoskeletal: No deformity.  Neuro: Alert and oriented x4.  Cranial nerves II through XII are grossly intact.  There are no visual field deficits.  Cerebellar function intact with finger-to-nose and heel-to-shin testing.   Motor strength is intact in the face and strength is 5 out of 5 in all 4 extremities without any asymmetry.  Sensation to light touch is intact in all 4 extremities without any asymmetry.  Dermatology: Skin is warm and dry  Psych: Mentation is grossly normal, cognition is grossly normal. Affect is appropriate.        DIAGNOSTIC RESULTS     EKG: All EKGs are interpreted by the Emergency Department Physician who either signs or Co-signs this chart in the absence of a cardiologist.    ECG 12 Lead Altered Mental Status   Preliminary Result   Test Reason : Altered Mental Status   Blood Pressure :   */*   mmHG   Vent. Rate :  77 BPM     Atrial Rate :  77 BPM      P-R Int : 184 ms          QRS Dur :  84 ms       QT Int : 382 ms       P-R-T Axes :  58   6  33 degrees      QTc Int : 432 ms      Normal sinus rhythm   Cannot rule out Anterior infarct , age undetermined   Abnormal ECG   When compared with ECG of 04-SEP-2023 14:57,   premature ventricular complexes are no longer  present      Referred By: EDMD           Confirmed By:             RADIOLOGY:   [x] Radiologist's Report Reviewed:  XR Chest 1 View   Final Result   Impression:   No evidence of acute cardiopulmonary disease.       Electronically Signed: Kulwinder Saunders MD     2/25/2024 1:09 PM EST     Workstation ID: [id number]          I ordered and independently reviewed the above noted radiographic studies.        LABS:    I have reviewed and interpreted all of the currently available lab results from this visit (if applicable):  Results for orders placed or performed during the hospital encounter of 02/25/24   COVID-19 and FLU A/B PCR, 1 HR TAT - Swab, Nasopharynx    Specimen: Nasopharynx; Swab   Result Value Ref Range    COVID19 Not Detected Not Detected - Ref. Range    Influenza A PCR Not Detected Not Detected    Influenza B PCR Not Detected Not Detected   Comprehensive Metabolic Panel    Specimen: Blood   Result Value Ref Range    Glucose 104 (H) 65 - 99 mg/dL    BUN 13 8 - 23 mg/dL    Creatinine 0.69 0.57 - 1.00 mg/dL    Sodium 136 136 - 145 mmol/L    Potassium 3.8 3.5 - 5.2 mmol/L    Chloride 99 98 - 107 mmol/L    CO2 28.0 22.0 - 29.0 mmol/L    Calcium 9.1 8.6 - 10.5 mg/dL    Total Protein 7.2 6.0 - 8.5 g/dL    Albumin 4.4 3.5 - 5.2 g/dL    ALT (SGPT) <5 1 - 33 U/L    AST (SGOT) 16 1 - 32 U/L    Alkaline Phosphatase 77 39 - 117 U/L    Total Bilirubin 0.5 0.0 - 1.2 mg/dL    Globulin 2.8 gm/dL    A/G Ratio 1.6 g/dL    BUN/Creatinine Ratio 18.8 7.0 - 25.0    Anion Gap 9.0 5.0 - 15.0 mmol/L    eGFR 97.1 >60.0 mL/min/1.73   Urinalysis With Culture If Indicated - Straight Cath    Specimen: Straight Cath; Urine   Result Value Ref Range    Color, UA Yellow Yellow, Straw    Appearance, UA Clear Clear    pH, UA 6.5 5.0 - 8.0    Specific Gravity, UA 1.016 1.001 - 1.030    Glucose, UA Negative Negative    Ketones, UA Negative Negative    Bilirubin, UA Negative Negative    Blood, UA Negative Negative    Protein, UA Negative Negative     Leuk Esterase, UA Negative Negative    Nitrite, UA Negative Negative    Urobilinogen, UA 0.2 E.U./dL 0.2 - 1.0 E.U./dL   Single High Sensitivity Troponin T    Specimen: Blood   Result Value Ref Range    HS Troponin T 9 <14 ng/L   TSH    Specimen: Blood   Result Value Ref Range    TSH 0.870 0.270 - 4.200 uIU/mL   T4, Free    Specimen: Blood   Result Value Ref Range    Free T4 1.42 0.93 - 1.70 ng/dL   CBC Auto Differential    Specimen: Blood   Result Value Ref Range    WBC 6.89 3.40 - 10.80 10*3/mm3    RBC 4.27 3.77 - 5.28 10*6/mm3    Hemoglobin 13.7 12.0 - 15.9 g/dL    Hematocrit 40.7 34.0 - 46.6 %    MCV 95.3 79.0 - 97.0 fL    MCH 32.1 26.6 - 33.0 pg    MCHC 33.7 31.5 - 35.7 g/dL    RDW 12.8 12.3 - 15.4 %    RDW-SD 44.6 37.0 - 54.0 fl    MPV 8.8 6.0 - 12.0 fL    Platelets 263 140 - 450 10*3/mm3    Neutrophil % 72.3 42.7 - 76.0 %    Lymphocyte % 19.6 19.6 - 45.3 %    Monocyte % 7.5 5.0 - 12.0 %    Eosinophil % 0.1 (L) 0.3 - 6.2 %    Basophil % 0.4 0.0 - 1.5 %    Immature Grans % 0.1 0.0 - 0.5 %    Neutrophils, Absolute 4.97 1.70 - 7.00 10*3/mm3    Lymphocytes, Absolute 1.35 0.70 - 3.10 10*3/mm3    Monocytes, Absolute 0.52 0.10 - 0.90 10*3/mm3    Eosinophils, Absolute 0.01 0.00 - 0.40 10*3/mm3    Basophils, Absolute 0.03 0.00 - 0.20 10*3/mm3    Immature Grans, Absolute 0.01 0.00 - 0.05 10*3/mm3    nRBC 0.0 0.0 - 0.2 /100 WBC   ECG 12 Lead Altered Mental Status   Result Value Ref Range    QT Interval 382 ms    QTC Interval 432 ms        If labs were ordered, I independently reviewed the results and considered them in treating the patient.      EMERGENCY DEPARTMENT COURSE and DIFFERENTIAL DIAGNOSIS/MDM:   Vitals:  AS OF 22:02 EST    BP - 176/95  HR - 68  TEMP - 97.5 °F (36.4 °C) (Oral)  O2 SATS - 100%        Discussion below represents my analysis of pertinent findings related to patient's condition, differential diagnosis, treatment plan and final disposition.      Differential diagnosis:  The differential diagnosis  associated with the patient's presentation includes: Urinary tract infection, electrolyte derangement, pneumonia, dehydration, thyroid abnormality, anemia      Independent interpretations (ECG/rhythm strip/X-ray/US/CT scan): I independently interpreted the patient's chest x-ray and cardiac monitor.  There is no evidence of pulmonary infiltrate and the patient is in sinus rhythm.      Additional sources:  Discussed/obtained information from independent historians:   [x] Spouse: Additional history taken from the patient's  at bedside and is as noted in HPI.   [] Parent:   [] Friend:   [] EMS:   [] Other:  External (non-ED) record review:   [] Inpatient record:   [] Office record:   [] Outpatient record:   [] Prior Outpatient labs:   [] Prior Outpatient radiology:   [] Primary Care record:   [] Outside ED record:   [] Other:       Patient's care impacted by:   [] Diabetes   [] Hypertension   [] Coronary Artery Disease   [] Cancer   [x] Other: Parkinson's, history of hyponatremia    Care significantly affected by Social Determinants of Health (housing and economic circumstances, unemployment)    [] Yes     [x] No   If yes, Patient's care significantly limited by  Social Determinants of Health including:    [] Inadequate housing    [] Low income    [] Alcoholism and drug addiction in family    [] Problems related to primary support group    [] Unemployment    [] Problems related to employment    [] Other Social Determinants of Health:       Consideration of admission/observation vs discharge: Patient well-appearing with symptoms that resolved during the course of her stay in the emergency department and without any evidence of emergent pathology identified and feel that she is stable for discharge home with outpatient follow-up      ED Course:    ED Course as of 02/25/24 2202   Sun Feb 25, 2024   1517 On reexamination, the patient feels significantly better.  She is resting comfortably in bed with normal vital  signs.  Her  at bedside and says that she has improved significantly since arrival as well.  States that this happens intermittently.  Lab work is reassuring without any evidence of hyponatremia or other significant electrolyte derangement.  There is no evidence of urinary tract infection, thyroid abnormality, or other acute process.  Patient stable for discharge home with close outpatient follow-up with her primary care provider. [NS]      ED Course User Index  [NS] Max Manning MD             I had a discussion with the patient/family regarding diagnosis, diagnostic results, treatment plan, and medications.  The patient/family indicated understanding of these instructions.  I spent adequate time at the bedside preceding discharge necessary to personally discuss the aftercare instructions, giving patient education, providing explanations of the results of our evaluations/findings, and my decision making to assure that the patient/family understand the plan of care.  Time was allotted to answer questions at that time and throughout the ED course.  Emphasis was placed on timely follow-up after discharge.  I also discussed the potential for the development of an acute emergent condition requiring further evaluation, admission, or even surgical intervention. I discussed that we found nothing during the visit today indicating the need for further workup, admission, or the presence of an unstable medical condition.  I encouraged the patient to return to the emergency department immediately for ANY concerns, worsening, new complaints, or if symptoms persist and unable to seek follow-up in a timely fashion.  The patient/family expressed understanding and agreement with this plan.  The patient will follow-up with their PCP in 1-2 days for reevaluation.           PROCEDURES:  Procedures    CRITICAL CARE TIME        FINAL IMPRESSION      1. Generalized weakness    2. Disoriented    3. History of Parkinson's  disease          DISPOSITION/PLAN     ED Disposition       ED Disposition   Discharge    Condition   Stable    Comment   --                 Comment: Please note this report has been produced using speech recognition software.      Max Manning MD  Attending Emergency Physician             Max Manning MD  02/25/24 1444

## 2024-03-05 ENCOUNTER — OFFICE VISIT (OUTPATIENT)
Dept: FAMILY MEDICINE CLINIC | Facility: CLINIC | Age: 64
End: 2024-03-05
Payer: COMMERCIAL

## 2024-03-05 VITALS
DIASTOLIC BLOOD PRESSURE: 78 MMHG | OXYGEN SATURATION: 100 % | WEIGHT: 157 LBS | HEIGHT: 66 IN | SYSTOLIC BLOOD PRESSURE: 132 MMHG | BODY MASS INDEX: 25.23 KG/M2 | HEART RATE: 93 BPM

## 2024-03-05 DIAGNOSIS — R53.83 OTHER FATIGUE: ICD-10-CM

## 2024-03-05 DIAGNOSIS — G20.B2 PARKINSON'S DISEASE WITH DYSKINESIA AND FLUCTUATING MANIFESTATIONS: Primary | ICD-10-CM

## 2024-03-05 DIAGNOSIS — R07.9 CHEST PAIN, UNSPECIFIED TYPE: ICD-10-CM

## 2024-03-05 PROBLEM — R44.3 HALLUCINATIONS: Status: ACTIVE | Noted: 2024-03-05

## 2024-03-05 LAB
QT INTERVAL: 382 MS
QTC INTERVAL: 432 MS

## 2024-03-05 RX ORDER — OMEPRAZOLE 20 MG/1
20 CAPSULE, DELAYED RELEASE ORAL DAILY
Qty: 30 CAPSULE | Refills: 1 | Status: SHIPPED | OUTPATIENT
Start: 2024-03-05

## 2024-03-05 RX ORDER — ASPIRIN 81 MG/1
81 TABLET ORAL DAILY
Qty: 90 TABLET | Refills: 1 | Status: SHIPPED | OUTPATIENT
Start: 2024-03-05

## 2024-03-05 NOTE — PROGRESS NOTES
Carolina Duarte  1960  [id number]  Patient Care Team:  Janes Sanchez MD as PCP - General (Internal Medicine)    Carolina Duarte is a 63 y.o. female here today for follow up.     This patient is accompanied by their self who contributes to the history of their care.    Chief Complaint:    Chief Complaint   Patient presents with    Hospital Follow Up Visit        History of Present Illness:  I have reviewed and/or updated the patient's past medical, past surgical, family, social history, problem list and allergies as appropriate.     ED 2/25/2024  Carolina Duarte is a 63 y.o. female who presents to the emergency department with complaint of fatigue that began yesterday.  Per report from  at bedside, she has a history of hyponatremia which is caused her to have similar symptoms.  She seems slightly disoriented this morning as well which has also been present in the past.  She has a history of Parkinson's.  Patient denies any current specific symptoms including any headache, chest pain, shortness of breath, cough, fever, chills, abdominal pain, vomiting, diarrhea, or urinary symptoms................... chest x-ray was unremarkable.  Labs are essentially unremarkable with most notably normal sodium.  Thyroid function was normal.    He  felt she was staring off. Symptoms occurred with awakening. She admits to having hallucination.s this happens infrequently.  She had been given sx of nuplezid  one per day- it is cost prohibitive 5300/per month. This was prescribed recently by her neurologist. ( 2 weeks ago)    Yesterday she had felt an issue with her heart.  She reports pain left sided chest pain. Happed x 2 - last being yesterday. Last 20 min for reticulocyte. Burning pain radiating to left arm/neck. She had associated dyspnea. She took rolaid with slight improvement. There e was no nausea givens vomitin or dysphagia. She takes nothing for reflux. Sx occurred in kitchen. She bloom snot take aspirin.  "She has not seen cardiology recently ( Dr. Longoria years ago at [facility]. )    Review of Systems   Constitutional:  Positive for diaphoresis and fatigue.   Respiratory:  Positive for shortness of breath. Negative for cough.    Cardiovascular:  Positive for chest pain.   Gastrointestinal:  Negative for nausea and vomiting.   Endocrine: Negative.    Genitourinary: Negative.    Musculoskeletal: Negative.    Skin: Negative.    Neurological:  Positive for tremors and confusion.       Vitals:    03/05/24 1104   BP: 132/78   Pulse: 93   SpO2: 100%   Weight: 71.2 kg (157 lb)   Height: 167.6 cm (65.98\")     Body mass index is 25.35 kg/m².    Physical Exam  Vitals and nursing note reviewed.   Constitutional:       General: She is not in acute distress.     Appearance: She is well-developed. She is not diaphoretic.   HENT:      Head: Normocephalic and atraumatic.      Right Ear: External ear normal.      Left Ear: External ear normal.      Mouth/Throat:      Pharynx: No oropharyngeal exudate.   Eyes:      General: No scleral icterus.        Right eye: No discharge.      Conjunctiva/sclera: Conjunctivae normal.   Neck:      Thyroid: No thyromegaly.      Vascular: No JVD.      Trachea: No tracheal deviation.   Cardiovascular:      Rate and Rhythm: Normal rate and regular rhythm.      Heart sounds: Normal heart sounds.      Comments: PMI nondisplaced  Pulmonary:      Effort: Pulmonary effort is normal.      Breath sounds: Normal breath sounds. No wheezing or rales.   Abdominal:      General: Bowel sounds are normal.      Palpations: Abdomen is soft.      Tenderness: There is no abdominal tenderness. There is no guarding or rebound.   Musculoskeletal:      Cervical back: Normal range of motion and neck supple.   Lymphadenopathy:      Cervical: No cervical adenopathy.   Skin:     General: Skin is warm and dry.      Capillary Refill: Capillary refill takes less than 2 seconds.      Coloration: Skin is not pale.      Findings: " No rash.   Neurological:      Mental Status: She is alert and oriented to person, place, and time.      Motor: No abnormal muscle tone.      Coordination: Coordination normal.   Psychiatric:         Judgment: Judgment normal.         Procedures    Results Review:    None    Assessment/Plan:    Problem List Items Addressed This Visit       Parkinson's disease with dyskinesia and fluctuating manifestations - Primary    Fatigue    Chest pain    Relevant Medications    omeprazole (priLOSEC) 20 MG capsule    Other Relevant Orders    Ambulatory Referral to [facility]   Chest pain she will start a baby aspirin.  She did have a remote cardiac evaluation at [facility] with Dr. Longoria practice however does not recall the results of this.  She agrees to heart [facility] referral.  I have empirically started her on aspirin 81 mg daily.  Should chest pain recur she should seek emergent care prior to being seen in the heart valve clinic.  Since she is improved slightly.  With tums, we  placed her on Prilosec 20 mg daily.  I suspect her hallucinations and fatigue are fluctuations in Parkinson's complications and dopaminergic side effects of Sinemet.  I really do not have any other options for order.  Have asked him to reach back out to neurology regarding the Nuplazid      Plan of care reviewed with patient at the conclusion of today's visit. Education was provided regarding diagnosis and management.  Patient verbalizes understanding of and agreement with management plan.    No follow-ups on file.    Janes Sanchez MD      Please note than portions of this note were completed [facility] a Voice Recognition Program

## 2024-03-20 ENCOUNTER — HOSPITAL ENCOUNTER (EMERGENCY)
Facility: HOSPITAL | Age: 64
Discharge: HOME | End: 2024-03-21
Attending: EMERGENCY MEDICINE
Payer: COMMERCIAL

## 2024-03-20 ENCOUNTER — APPOINTMENT (OUTPATIENT)
Dept: GENERAL RADIOLOGY | Facility: HOSPITAL | Age: 64
End: 2024-03-20
Payer: COMMERCIAL

## 2024-03-20 DIAGNOSIS — G20.A1 PARKINSON'S DISEASE, UNSPECIFIED WHETHER DYSKINESIA PRESENT, UNSPECIFIED WHETHER MANIFESTATIONS FLUCTUATE: Primary | ICD-10-CM

## 2024-03-20 DIAGNOSIS — R07.9 CHEST PAIN, UNSPECIFIED TYPE: ICD-10-CM

## 2024-03-20 LAB
ALBUMIN SERPL-MCNC: 4.4 G/DL (ref 3.5–5.2)
ALBUMIN/GLOB SERPL: 1.8 G/DL
ALP SERPL-CCNC: 74 U/L (ref 39–117)
ALT SERPL W P-5'-P-CCNC: 5 U/L (ref 1–33)
ANION GAP SERPL CALCULATED.3IONS-SCNC: 12 MMOL/L (ref 5–15)
AST SERPL-CCNC: 24 U/L (ref 1–32)
BASOPHILS # BLD AUTO: 0.03 10*3/MM3 (ref 0–0.2)
BASOPHILS NFR BLD AUTO: 0.4 % (ref 0–1.5)
BILIRUB SERPL-MCNC: 0.4 MG/DL (ref 0–1.2)
BILIRUB UR QL STRIP: NEGATIVE
BUN SERPL-MCNC: 12 MG/DL (ref 8–23)
BUN/CREAT SERPL: 15.2 (ref 7–25)
CALCIUM SPEC-SCNC: 9.2 MG/DL (ref 8.6–10.5)
CHLORIDE SERPL-SCNC: 98 MMOL/L (ref 98–107)
CLARITY UR: CLEAR
CO2 SERPL-SCNC: 25 MMOL/L (ref 22–29)
COLOR UR: YELLOW
CREAT SERPL-MCNC: 0.79 MG/DL (ref 0.57–1)
D-LACTATE SERPL-SCNC: 1.1 MMOL/L (ref 0.5–2)
DEPRECATED RDW RBC AUTO: 43.3 FL (ref 37–54)
EGFRCR SERPLBLD CKD-EPI 2021: 83.6 ML/MIN/1.73
EOSINOPHIL # BLD AUTO: 0.03 10*3/MM3 (ref 0–0.4)
EOSINOPHIL NFR BLD AUTO: 0.4 % (ref 0.3–6.2)
ERYTHROCYTE [DISTWIDTH] IN BLOOD BY AUTOMATED COUNT: 12.7 % (ref 12.3–15.4)
GLOBULIN UR ELPH-MCNC: 2.5 GM/DL
GLUCOSE SERPL-MCNC: 108 MG/DL (ref 65–99)
GLUCOSE UR STRIP-MCNC: NEGATIVE MG/DL
HCT VFR BLD AUTO: 37.1 % (ref 34–46.6)
HGB BLD-MCNC: 12.4 G/DL (ref 12–15.9)
HGB UR QL STRIP.AUTO: NEGATIVE
HOLD SPECIMEN: NORMAL
HOLD SPECIMEN: NORMAL
IMM GRANULOCYTES # BLD AUTO: 0.01 10*3/MM3 (ref 0–0.05)
IMM GRANULOCYTES NFR BLD AUTO: 0.1 % (ref 0–0.5)
KETONES UR QL STRIP: ABNORMAL
LEUKOCYTE ESTERASE UR QL STRIP.AUTO: ABNORMAL
LYMPHOCYTES # BLD AUTO: 2.82 10*3/MM3 (ref 0.7–3.1)
LYMPHOCYTES NFR BLD AUTO: 40.7 % (ref 19.6–45.3)
MAGNESIUM SERPL-MCNC: 2.1 MG/DL (ref 1.6–2.4)
MCH RBC QN AUTO: 31 PG (ref 26.6–33)
MCHC RBC AUTO-ENTMCNC: 33.4 G/DL (ref 31.5–35.7)
MCV RBC AUTO: 92.8 FL (ref 79–97)
MONOCYTES # BLD AUTO: 0.59 10*3/MM3 (ref 0.1–0.9)
MONOCYTES NFR BLD AUTO: 8.5 % (ref 5–12)
NEUTROPHILS NFR BLD AUTO: 3.45 10*3/MM3 (ref 1.7–7)
NEUTROPHILS NFR BLD AUTO: 49.9 % (ref 42.7–76)
NITRITE UR QL STRIP: NEGATIVE
NRBC BLD AUTO-RTO: 0 /100 WBC (ref 0–0.2)
PH UR STRIP.AUTO: 6 [PH] (ref 5–8)
PLATELET # BLD AUTO: 288 10*3/MM3 (ref 140–450)
PMV BLD AUTO: 8.9 FL (ref 6–12)
POTASSIUM SERPL-SCNC: 3.9 MMOL/L (ref 3.5–5.2)
PROCALCITONIN SERPL-MCNC: 0.02 NG/ML (ref 0–0.25)
PROT SERPL-MCNC: 6.9 G/DL (ref 6–8.5)
PROT UR QL STRIP: ABNORMAL
RBC # BLD AUTO: 4 10*6/MM3 (ref 3.77–5.28)
SODIUM SERPL-SCNC: 135 MMOL/L (ref 136–145)
SP GR UR STRIP: 1.03 (ref 1–1.03)
TROPONIN T SERPL HS-MCNC: 10 NG/L
UROBILINOGEN UR QL STRIP: ABNORMAL
WBC NRBC COR # BLD AUTO: 6.93 10*3/MM3 (ref 3.4–10.8)
WHOLE BLOOD HOLD COAG: NORMAL
WHOLE BLOOD HOLD SPECIMEN: NORMAL

## 2024-03-20 PROCEDURE — 25810000003 SODIUM CHLORIDE 0.9 % SOLUTION: Performed by: PHYSICIAN ASSISTANT

## 2024-03-20 PROCEDURE — 87086 URINE CULTURE/COLONY COUNT: CPT | Performed by: PHYSICIAN ASSISTANT

## 2024-03-20 PROCEDURE — 71045 X-RAY EXAM CHEST 1 VIEW: CPT

## 2024-03-20 PROCEDURE — 85025 COMPLETE CBC W/AUTO DIFF WBC: CPT

## 2024-03-20 PROCEDURE — 84484 ASSAY OF TROPONIN QUANT: CPT

## 2024-03-20 PROCEDURE — 99284 EMERGENCY DEPT VISIT MOD MDM: CPT

## 2024-03-20 PROCEDURE — 96360 HYDRATION IV INFUSION INIT: CPT

## 2024-03-20 PROCEDURE — 36415 COLL VENOUS BLD VENIPUNCTURE: CPT

## 2024-03-20 PROCEDURE — 81001 URINALYSIS AUTO W/SCOPE: CPT | Performed by: PHYSICIAN ASSISTANT

## 2024-03-20 PROCEDURE — 93005 ELECTROCARDIOGRAM TRACING: CPT

## 2024-03-20 PROCEDURE — 84145 PROCALCITONIN (PCT): CPT | Performed by: PHYSICIAN ASSISTANT

## 2024-03-20 PROCEDURE — 83735 ASSAY OF MAGNESIUM: CPT

## 2024-03-20 PROCEDURE — 80053 COMPREHEN METABOLIC PANEL: CPT

## 2024-03-20 PROCEDURE — 83605 ASSAY OF LACTIC ACID: CPT | Performed by: PHYSICIAN ASSISTANT

## 2024-03-20 RX ORDER — OMEPRAZOLE 20 MG/1
20 CAPSULE, DELAYED RELEASE ORAL DAILY
Qty: 30 CAPSULE | Refills: 1 | OUTPATIENT
Start: 2024-03-20

## 2024-03-20 RX ORDER — SODIUM CHLORIDE 0.9 % (FLUSH) 0.9 %
10 SYRINGE (ML) INJECTION AS NEEDED
Status: DISCONTINUED | OUTPATIENT
Start: 2024-03-20 | End: 2024-03-21 | Stop reason: HOSPADM

## 2024-03-20 RX ADMIN — SODIUM CHLORIDE 1000 ML: 9 INJECTION, SOLUTION INTRAVENOUS at 22:31

## 2024-03-21 ENCOUNTER — APPOINTMENT (OUTPATIENT)
Dept: CT IMAGING | Facility: HOSPITAL | Age: 64
End: 2024-03-21
Payer: COMMERCIAL

## 2024-03-21 VITALS
SYSTOLIC BLOOD PRESSURE: 144 MMHG | WEIGHT: 130 LBS | HEIGHT: 66 IN | HEART RATE: 75 BPM | DIASTOLIC BLOOD PRESSURE: 80 MMHG | OXYGEN SATURATION: 98 % | BODY MASS INDEX: 20.89 KG/M2 | RESPIRATION RATE: 20 BRPM | TEMPERATURE: 98 F

## 2024-03-21 LAB
BACTERIA UR QL AUTO: ABNORMAL /HPF
COD CRY URNS QL: ABNORMAL /HPF
GLUCOSE BLDC GLUCOMTR-MCNC: 107 MG/DL (ref 70–130)
HOLD SPECIMEN: NORMAL
HYALINE CASTS UR QL AUTO: ABNORMAL /LPF
RBC # UR STRIP: ABNORMAL /HPF
REF LAB TEST METHOD: ABNORMAL
SQUAMOUS #/AREA URNS HPF: ABNORMAL /HPF
WBC # UR STRIP: ABNORMAL /HPF

## 2024-03-21 PROCEDURE — 70450 CT HEAD/BRAIN W/O DYE: CPT

## 2024-03-21 PROCEDURE — 82948 REAGENT STRIP/BLOOD GLUCOSE: CPT

## 2024-03-21 NOTE — ED PROVIDER NOTES
Subjective  History of Present Illness:    Chief Complaint: Confusion, dysuria, decreased urine  History of Present Illness: 63-year-old female arrived via EMS for report of confusion, she states she has not had normal urination for over a week, she does have frequency and burning and dribbling with urinating.  She states these are similar symptoms to when she had a UTI before.   was concerned due to confusion. Past medical history for anxiety, hypothyroidism, Parkinson's disease  Onset: Gradual onset  Duration: Symptoms worse today  Exacerbating / Alleviating factors: Painful urination and frequency  Associated symptoms: Confusion      Nurses Notes reviewed and agree, including vitals, allergies, social history and prior medical history.     REVIEW OF SYSTEMS: All systems reviewed and not pertinent unless noted.    Review of Systems   Genitourinary:  Positive for difficulty urinating and dysuria.   Neurological:  Positive for weakness.   Psychiatric/Behavioral:  Positive for confusion.    All other systems reviewed and are negative.      Past Medical History:   Diagnosis Date    Allergic 2003    One shot every 2 weeks    Anxiety 10/1/2015    Arthritis Dec 2022    75 mg Twice/day    Cancer     Hyperthyroidism Pre 9/1/2013    Parkinson's disease        Allergies:    Codeine and Epinephrine      Past Surgical History:   Procedure Laterality Date    BREAST SURGERY  10/5/2014    lumpectomy    COLONOSCOPY  ??? 2018    Luis Eli MD [facility]         Social History     Socioeconomic History    Marital status:    Tobacco Use    Smoking status: Never     Passive exposure: Never    Smokeless tobacco: Never   Vaping Use    Vaping status: Never Used   Substance and Sexual Activity    Alcohol use: Yes     Alcohol/week: 14.0 standard drinks of alcohol     Types: 4 Glasses of wine, 5 Shots of liquor, 5 Drinks containing 0.5 oz of alcohol per week     Comment: denies x 3 weeks    Drug use: Never    Sexual  "activity: Not Currently     Partners: Male     Birth control/protection: Post-menopausal         Family History   Problem Relation Age of Onset    Cancer Mother         bladder 2022    Alcohol abuse Father          92    Cancer Maternal Aunt         breast    Stroke Sister         double stroke 2022       Objective  Physical Exam:  /80 (BP Location: Right arm, Patient Position: Lying)   Pulse 75   Temp 98 °F (36.7 °C) (Oral)   Resp 20   Ht 167.6 cm (66\")   Wt 59 kg (130 lb)   SpO2 98%   BMI 20.98 kg/m²      Physical Exam  Vitals and nursing note reviewed.   Constitutional:       Appearance: She is well-developed.   HENT:      Head: Normocephalic and atraumatic.   Cardiovascular:      Rate and Rhythm: Normal rate and regular rhythm.   Pulmonary:      Effort: Pulmonary effort is normal.      Breath sounds: Normal breath sounds.   Abdominal:      Palpations: Abdomen is soft.   Musculoskeletal:         General: Normal range of motion.      Cervical back: Normal range of motion and neck supple.   Skin:     General: Skin is warm and dry.   Neurological:      Mental Status: She is alert and oriented to person, place, and time.      Deep Tendon Reflexes: Reflexes are normal and symmetric.           Procedures    ED Course:        ED Course as of 24 0403   Wed Mar 20, 2024   2325 Chest x-ray interpretation by me: No acute cardiopulmonary abnormality [CS]   2325 Review of previous  non ED visits, prior labs, prior imaging, available notes from prior evaluations or visits with specialists, medication list, allergies, past medical history, past surgical history     [CS]   2325 I Personally reviewed all laboratory studies performed in the emergency department  [CS]   Thu Mar 21, 2024   0043 CT scan head interpretation by me: No acute mass or bleed. [CS]   0104 I had a discussion with the patient/family regarding diagnosis, diagnostic results, treatment plan, and medications. The " patient/family indicated understanding of these instructions. I spent adequate time at the bedside prior to discharge necessary to discuss the aftercare instructions, giving patient education, providing explanations of the results of our evaluations/findings, and my decision making to assure that the patient/family understand the plan of care. Time was allotted to answer questions at that time and throughout the ED course. Patient is required to maintain timely follow up, as discussed. I also discussed the potential for the development of an acute emergent condition requiring further evaluation, return to the ER, admission, or even surgical intervention.  I encouraged the patient to return to the emergency department immediately for any concerns, worsening symptoms, new complaints, or if symptoms persist and they are unable to seek follow-up in a timely fashion. The patient/family expressed understanding and agreement with this plan   [CS]   0107 Patient's  says this has been the predominant symptom in her Parkinson's, she gets reoccurring confusion, in the past her sodium has been low, he was most concerned about hyponatremia, her sodium was 135 tonight, her workup was unremarkable, patient and family are comfortable with her being discharged home and requesting discharge at this time. [CS]      ED Course User Index  [CS] Henry Jimenez Jr., NORMA       Lab Results (last 24 hours)       Procedure Component Value Units Date/Time    CBC & Differential [[id number]]  (Normal) Collected: 03/20/24 2201    Specimen: Blood Updated: 03/20/24 2220    Narrative:      The following orders were created for panel order CBC & Differential.  Procedure                               Abnormality         Status                     ---------                               -----------         ------                     CBC Auto Differential[[id number]]        Normal              Final result                 Please view results for  these tests on the individual orders.    Comprehensive Metabolic Panel [[id number]]  (Abnormal) Collected: 03/20/24 2201    Specimen: Blood Updated: 03/20/24 2257     Glucose 108 mg/dL      BUN 12 mg/dL      Creatinine 0.79 mg/dL      Sodium 135 mmol/L      Potassium 3.9 mmol/L      Comment: Slight hemolysis detected by analyzer. Result may be falsely elevated.        Chloride 98 mmol/L      CO2 25.0 mmol/L      Calcium 9.2 mg/dL      Total Protein 6.9 g/dL      Albumin 4.4 g/dL      ALT (SGPT) 5 U/L      AST (SGOT) 24 U/L      Alkaline Phosphatase 74 U/L      Total Bilirubin 0.4 mg/dL      Globulin 2.5 gm/dL      Comment: Calculated Result        A/G Ratio 1.8 g/dL      BUN/Creatinine Ratio 15.2     Anion Gap 12.0 mmol/L      eGFR 83.6 mL/min/1.73     Narrative:      GFR Normal >60  Chronic Kidney Disease <60  Kidney Failure <15    Hemolyzed specimen. Testing performed per physician request.SLIGHTHEMOLYSIS    Single High Sensitivity Troponin T [[id number]]  (Normal) Collected: 03/20/24 2201    Specimen: Blood Updated: 03/20/24 2239     HS Troponin T 10 ng/L     Narrative:      High Sensitive Troponin T Reference Range:  <14.0 ng/L- Negative Female for AMI  <22.0 ng/L- Negative Male for AMI  >=14 - Abnormal Female indicating possible myocardial injury.  >=22 - Abnormal Male indicating possible myocardial injury.   Clinicians would have to utilize clinical acumen, EKG, Troponin, and serial changes to determine if it is an Acute Myocardial Infarction or myocardial injury due to an underlying chronic condition.         Magnesium [[id number]]  (Normal) Collected: 03/20/24 2201    Specimen: Blood Updated: 03/20/24 2245     Magnesium 2.1 mg/dL     CBC Auto Differential [[id number]]  (Normal) Collected: 03/20/24 2201    Specimen: Blood Updated: 03/20/24 2220     WBC 6.93 10*3/mm3      RBC 4.00 10*6/mm3      Hemoglobin 12.4 g/dL      Hematocrit 37.1 %      MCV 92.8 fL      MCH 31.0 pg      MCHC 33.4 g/dL      RDW 12.7 %       "RDW-SD 43.3 fl      MPV 8.9 fL      Platelets 288 10*3/mm3      Neutrophil % 49.9 %      Lymphocyte % 40.7 %      Monocyte % 8.5 %      Eosinophil % 0.4 %      Basophil % 0.4 %      Immature Grans % 0.1 %      Neutrophils, Absolute 3.45 10*3/mm3      Lymphocytes, Absolute 2.82 10*3/mm3      Monocytes, Absolute 0.59 10*3/mm3      Eosinophils, Absolute 0.03 10*3/mm3      Basophils, Absolute 0.03 10*3/mm3      Immature Grans, Absolute 0.01 10*3/mm3      nRBC 0.0 /100 WBC     Lactic Acid, Plasma [[id number]]  (Normal) Collected: 03/20/24 2201    Specimen: Blood Updated: 03/20/24 2236     Lactate 1.1 mmol/L      Comment: Falsely depressed results may occur on samples drawn from patients receiving N-Acetylcysteine (NAC) or Metamizole.       Procalcitonin [[id number]]  (Normal) Collected: 03/20/24 2201    Specimen: Blood Updated: 03/20/24 2243     Procalcitonin 0.02 ng/mL     Narrative:      As a Marker for Sepsis (Non-Neonates):    1. <0.5 ng/mL represents a low risk of severe sepsis and/or septic shock.  2. >2 ng/mL represents a high risk of severe sepsis and/or septic shock.    As a Marker for Lower Respiratory Tract Infections that require antibiotic therapy:    PCT on Admission    Antibiotic Therapy       6-12 Hrs later    >0.5                Strongly Recommended  >0.25 - <0.5        Recommended   0.1 - 0.25          Discouraged              Remeasure/reassess PCT  <0.1                Strongly Discouraged     Remeasure/reassess PCT    As 28 day mortality risk marker: \"Change in Procalcitonin Result\" (>80% or <=80%) if Day 0 (or Day 1) and Day 4 values are available. Refer to http://[email or web address]-pct-calculator.com    Change in PCT <=80%  A decrease of PCT levels below or equal to 80% defines a positive change in PCT test result representing a higher risk for 28-day all-cause mortality of patients diagnosed with severe sepsis for septic shock.    Change in PCT >80%  A decrease of PCT levels of more than 80% defines a " negative change in PCT result representing a lower risk for 28-day all-cause mortality of patients diagnosed with severe sepsis or septic shock.       Urinalysis With Culture If Indicated - Urine, Clean Catch [[id number]]  (Abnormal) Collected: 03/20/24 2304    Specimen: Urine, Clean Catch Updated: 03/20/24 2344     Color, UA Yellow     Appearance, UA Clear     pH, UA 6.0     Specific Gravity, UA 1.026     Glucose, UA Negative     Ketones, UA Trace     Bilirubin, UA Negative     Blood, UA Negative     Protein, UA 30 mg/dL (1+)     Leuk Esterase, UA Trace     Nitrite, UA Negative     Urobilinogen, UA 1.0 E.U./dL    Narrative:      In absence of clinical symptoms, the presence of pyuria, bacteria, and/or nitrites on the urinalysis result does not correlate with infection.    Urinalysis, Microscopic Only - Urine, Clean Catch [[id number]]  (Abnormal) Collected: 03/20/24 2304    Specimen: Urine, Clean Catch Updated: 03/21/24 0053     RBC, UA 6-10 /HPF      WBC, UA 6-10 /HPF      Bacteria, UA None Seen /HPF      Squamous Epithelial Cells, UA 3-6 /HPF      Hyaline Casts, UA 0-6 /LPF      Calcium Oxalate Crystals, UA Moderate/2+ /HPF      Methodology Manual Light Microscopy    Urine Culture - Urine, Urine, Clean Catch [[id number]] Collected: 03/20/24 2304    Specimen: Urine, Clean Catch Updated: 03/21/24 0053    POC Glucose Once [[id number]]  (Normal) Collected: 03/21/24 0030    Specimen: Blood Updated: 03/21/24 0031     Glucose 107 mg/dL              CT Head Without Contrast    Result Date: 3/21/2024  CT HEAD WO CONTRAST Date of Exam: 3/21/2024 12:22 AM EDT Indication: confusion. Comparison: CT scan the head from September 4, 2023 Technique: Axial CT images were obtained of the head without contrast administration.  Automated exposure control and iterative construction methods were used. Findings: There is no evidence of hemorrhage. There is no mass effect or midline shift. There is no extracerebral collection. Ventricles are  normal in size and configuration for patient's stated age.  Posterior fossa is within normal limits. Calvarium and skull base appear intact.   Visualized sinuses show no air fluid levels. Visualized orbits are unremarkable.     Impression: Impression: Normal Electronically Signed: Chase Coulter MD  3/21/2024 12:38 AM EDT  Workstation ID: [id number]    XR Chest 1 View    Result Date: 3/20/2024  XR CHEST 1 VW Date of Exam: 3/20/2024 10:11 PM EDT Indication: Weak/Dizzy/AMS triage protocol Comparison: None available. Findings: No focal consolidation. No pneumothorax or pleural effusion. Cardiac size is normal. The visualized clavicles appear intact. No displaced rib fractures. The visualized upper abdomen is normal.     Impression: Impression: No acute cardiopulmonary disease. Electronically Signed: Paul Peacock MD  3/20/2024 10:31 PM EDT  Workstation ID: [id number]        Medical Decision Making  Patient Presentation to 63-year-old female with Parkinson's presented with confusion intermittently, and weakness here with her  who help provide history due to her history of Parkinson's, stating that this is the predominant symptom with her disease and he was concerned about hyponatremia, she had no acute neurologic deficits on physical exam    DDX hyponatremia, dehydration, electrolyte abnormality, intracranial abnormality, UTI, pneumonia    Data Review/ Non ED Records /Analysis/Ordering unique tests  Review of previous  non ED visits, prior labs, prior imaging, available notes from prior evaluations or visits with specialists, medication list, allergies, past medical history, past surgical history        Independent Review Studies  I Personally reviewed all laboratory studies performed in the emergency department     Intervention/Re-evaluation no acute intervention on reevaluation she remained hemodynamically stable, and no neurologic deficits    Independent Clinician no consultation    Risk Stratification tools/clinical  decision rules patient presented with history of Parkinson's and hyponatremia, also had symptoms of confusion at home according to .  I considered, sources of infection including UTI, pneumonia, I also considered dehydration or intracranial abnormalities therefore labs and a CT scan were performed, that were unremarkable shared decision making was discussed and patient and family felt comfortable going home    Shared Decision Making patient and family felt agreeable going home    Disposition patient stable for discharge    Problems Addressed:  Parkinson's disease, unspecified whether dyskinesia present, unspecified whether manifestations fluctuate: complicated acute illness or injury    Amount and/or Complexity of Data Reviewed  External Data Reviewed: labs, radiology and notes.  Labs: ordered. Decision-making details documented in ED Course.  Radiology: ordered and independent interpretation performed. Decision-making details documented in ED Course.          Final diagnoses:   Parkinson's disease, unspecified whether dyskinesia present, unspecified whether manifestations fluctuate          Henry Jimenez Jr., PA-C  03/21/24 0409

## 2024-03-22 LAB
BACTERIA SPEC AEROBE CULT: NORMAL
QT INTERVAL: 382 MS
QTC INTERVAL: 446 MS

## 2024-05-08 ENCOUNTER — OFFICE VISIT (OUTPATIENT)
Dept: FAMILY MEDICINE CLINIC | Facility: CLINIC | Age: 64
End: 2024-05-08
Payer: MEDICARE

## 2024-05-08 VITALS
DIASTOLIC BLOOD PRESSURE: 80 MMHG | WEIGHT: 144.4 LBS | SYSTOLIC BLOOD PRESSURE: 142 MMHG | BODY MASS INDEX: 23.21 KG/M2 | HEIGHT: 66 IN | HEART RATE: 91 BPM | OXYGEN SATURATION: 97 %

## 2024-05-08 DIAGNOSIS — R44.3 HALLUCINATIONS: ICD-10-CM

## 2024-05-08 DIAGNOSIS — F32.A DEPRESSION, UNSPECIFIED DEPRESSION TYPE: Primary | ICD-10-CM

## 2024-05-08 DIAGNOSIS — E03.9 ACQUIRED HYPOTHYROIDISM: ICD-10-CM

## 2024-05-08 DIAGNOSIS — E78.2 MIXED HYPERLIPIDEMIA: ICD-10-CM

## 2024-05-08 PROCEDURE — 1126F AMNT PAIN NOTED NONE PRSNT: CPT | Performed by: INTERNAL MEDICINE

## 2024-05-08 PROCEDURE — 99214 OFFICE O/P EST MOD 30 MIN: CPT | Performed by: INTERNAL MEDICINE

## 2024-05-08 RX ORDER — PIMAVANSERIN TARTRATE 34 MG/1
CAPSULE ORAL
COMMUNITY

## 2024-06-09 ENCOUNTER — HOSPITAL ENCOUNTER (OUTPATIENT)
Dept: RADIOLOGY | Facility: CLINIC | Age: 64
Discharge: HOME | End: 2024-06-09
Payer: MEDICARE

## 2024-06-09 DIAGNOSIS — R05.1 ACUTE COUGH: ICD-10-CM

## 2024-06-09 PROCEDURE — 71046 X-RAY EXAM CHEST 2 VIEWS: CPT

## 2024-09-09 ENCOUNTER — APPOINTMENT (OUTPATIENT)
Dept: CT IMAGING | Facility: HOSPITAL | Age: 64
End: 2024-09-09
Payer: MEDICARE

## 2024-09-09 ENCOUNTER — APPOINTMENT (OUTPATIENT)
Dept: GENERAL RADIOLOGY | Facility: HOSPITAL | Age: 64
End: 2024-09-09
Payer: MEDICARE

## 2024-09-09 ENCOUNTER — HOSPITAL ENCOUNTER (EMERGENCY)
Facility: HOSPITAL | Age: 64
Discharge: HOME | End: 2024-09-09
Attending: EMERGENCY MEDICINE | Admitting: EMERGENCY MEDICINE
Payer: MEDICARE

## 2024-09-09 VITALS
DIASTOLIC BLOOD PRESSURE: 98 MMHG | HEIGHT: 65 IN | OXYGEN SATURATION: 97 % | SYSTOLIC BLOOD PRESSURE: 116 MMHG | TEMPERATURE: 99.2 F | HEART RATE: 80 BPM | RESPIRATION RATE: 18 BRPM | BODY MASS INDEX: 23.32 KG/M2 | WEIGHT: 140 LBS

## 2024-09-09 DIAGNOSIS — Z86.69 HISTORY OF PARKINSON'S DISEASE: ICD-10-CM

## 2024-09-09 DIAGNOSIS — Z86.59 HISTORY OF DEMENTIA: ICD-10-CM

## 2024-09-09 DIAGNOSIS — R41.82 ALTERED MENTAL STATUS, UNSPECIFIED ALTERED MENTAL STATUS TYPE: Primary | ICD-10-CM

## 2024-09-09 LAB
ALBUMIN SERPL-MCNC: 4.2 G/DL (ref 3.5–5.2)
ALBUMIN/GLOB SERPL: 1.7 G/DL
ALP SERPL-CCNC: 68 U/L (ref 39–117)
ALT SERPL W P-5'-P-CCNC: 8 U/L (ref 1–33)
AMMONIA BLD-SCNC: <10 UMOL/L (ref 11–51)
AMPHET+METHAMPHET UR QL: NEGATIVE
AMPHETAMINES UR QL: NEGATIVE
ANION GAP SERPL CALCULATED.3IONS-SCNC: 12 MMOL/L (ref 5–15)
APAP SERPL-MCNC: <5 MCG/ML (ref 0–30)
AST SERPL-CCNC: 23 U/L (ref 1–32)
B PARAPERT DNA SPEC QL NAA+PROBE: NOT DETECTED
B PERT DNA SPEC QL NAA+PROBE: NOT DETECTED
BARBITURATES UR QL SCN: NEGATIVE
BASOPHILS # BLD AUTO: 0.01 10*3/MM3 (ref 0–0.2)
BASOPHILS NFR BLD AUTO: 0.2 % (ref 0–1.5)
BENZODIAZ UR QL SCN: NEGATIVE
BILIRUB SERPL-MCNC: 0.6 MG/DL (ref 0–1.2)
BILIRUB UR QL STRIP: NEGATIVE
BUN SERPL-MCNC: 14 MG/DL (ref 8–23)
BUN/CREAT SERPL: 19.7 (ref 7–25)
BUPRENORPHINE SERPL-MCNC: NEGATIVE NG/ML
C PNEUM DNA NPH QL NAA+NON-PROBE: NOT DETECTED
CALCIUM SPEC-SCNC: 9.2 MG/DL (ref 8.6–10.5)
CANNABINOIDS SERPL QL: NEGATIVE
CHLORIDE SERPL-SCNC: 103 MMOL/L (ref 98–107)
CLARITY UR: CLEAR
CO2 SERPL-SCNC: 25 MMOL/L (ref 22–29)
COCAINE UR QL: NEGATIVE
COLOR UR: YELLOW
CREAT SERPL-MCNC: 0.71 MG/DL (ref 0.57–1)
D-LACTATE SERPL-SCNC: 0.7 MMOL/L (ref 0.5–2)
DEPRECATED RDW RBC AUTO: 42.9 FL (ref 37–54)
EGFRCR SERPLBLD CKD-EPI 2021: 94.5 ML/MIN/1.73
EOSINOPHIL # BLD AUTO: 0 10*3/MM3 (ref 0–0.4)
EOSINOPHIL NFR BLD AUTO: 0 % (ref 0.3–6.2)
ERYTHROCYTE [DISTWIDTH] IN BLOOD BY AUTOMATED COUNT: 12.9 % (ref 12.3–15.4)
ETHANOL BLD-MCNC: <10 MG/DL (ref 0–10)
FLUAV SUBTYP SPEC NAA+PROBE: NOT DETECTED
FLUBV RNA ISLT QL NAA+PROBE: NOT DETECTED
GLOBULIN UR ELPH-MCNC: 2.5 GM/DL
GLUCOSE SERPL-MCNC: 99 MG/DL (ref 65–99)
GLUCOSE UR STRIP-MCNC: NEGATIVE MG/DL
HADV DNA SPEC NAA+PROBE: NOT DETECTED
HCOV 229E RNA SPEC QL NAA+PROBE: NOT DETECTED
HCOV HKU1 RNA SPEC QL NAA+PROBE: NOT DETECTED
HCOV NL63 RNA SPEC QL NAA+PROBE: NOT DETECTED
HCOV OC43 RNA SPEC QL NAA+PROBE: NOT DETECTED
HCT VFR BLD AUTO: 38.5 % (ref 34–46.6)
HGB BLD-MCNC: 13 G/DL (ref 12–15.9)
HGB UR QL STRIP.AUTO: NEGATIVE
HMPV RNA NPH QL NAA+NON-PROBE: NOT DETECTED
HOLD SPECIMEN: NORMAL
HPIV1 RNA ISLT QL NAA+PROBE: NOT DETECTED
HPIV2 RNA SPEC QL NAA+PROBE: NOT DETECTED
HPIV3 RNA NPH QL NAA+PROBE: NOT DETECTED
HPIV4 P GENE NPH QL NAA+PROBE: NOT DETECTED
IMM GRANULOCYTES # BLD AUTO: 0.01 10*3/MM3 (ref 0–0.05)
IMM GRANULOCYTES NFR BLD AUTO: 0.2 % (ref 0–0.5)
KETONES UR QL STRIP: NEGATIVE
LEUKOCYTE ESTERASE UR QL STRIP.AUTO: NEGATIVE
LYMPHOCYTES # BLD AUTO: 1.07 10*3/MM3 (ref 0.7–3.1)
LYMPHOCYTES NFR BLD AUTO: 17.6 % (ref 19.6–45.3)
M PNEUMO IGG SER IA-ACNC: NOT DETECTED
MAGNESIUM SERPL-MCNC: 2.1 MG/DL (ref 1.6–2.4)
MCH RBC QN AUTO: 30.7 PG (ref 26.6–33)
MCHC RBC AUTO-ENTMCNC: 33.8 G/DL (ref 31.5–35.7)
MCV RBC AUTO: 90.8 FL (ref 79–97)
METHADONE UR QL SCN: NEGATIVE
MONOCYTES # BLD AUTO: 0.43 10*3/MM3 (ref 0.1–0.9)
MONOCYTES NFR BLD AUTO: 7.1 % (ref 5–12)
NEUTROPHILS NFR BLD AUTO: 4.57 10*3/MM3 (ref 1.7–7)
NEUTROPHILS NFR BLD AUTO: 74.9 % (ref 42.7–76)
NITRITE UR QL STRIP: NEGATIVE
NRBC BLD AUTO-RTO: 0 /100 WBC (ref 0–0.2)
OPIATES UR QL: NEGATIVE
OXYCODONE UR QL SCN: NEGATIVE
PCP UR QL SCN: NEGATIVE
PH UR STRIP.AUTO: 6.5 [PH] (ref 5–8)
PLATELET # BLD AUTO: 249 10*3/MM3 (ref 140–450)
PMV BLD AUTO: 9.2 FL (ref 6–12)
POTASSIUM SERPL-SCNC: 3.8 MMOL/L (ref 3.5–5.2)
PROCALCITONIN SERPL-MCNC: 0.03 NG/ML (ref 0–0.25)
PROT SERPL-MCNC: 6.7 G/DL (ref 6–8.5)
PROT UR QL STRIP: NEGATIVE
QT INTERVAL: 378 MS
QT INTERVAL: 380 MS
QTC INTERVAL: 439 MS
QTC INTERVAL: 443 MS
RBC # BLD AUTO: 4.24 10*6/MM3 (ref 3.77–5.28)
RHINOVIRUS RNA SPEC NAA+PROBE: NOT DETECTED
RSV RNA NPH QL NAA+NON-PROBE: NOT DETECTED
SALICYLATES SERPL-MCNC: <0.3 MG/DL
SARS-COV-2 RNA NPH QL NAA+NON-PROBE: NOT DETECTED
SODIUM SERPL-SCNC: 140 MMOL/L (ref 136–145)
SP GR UR STRIP: 1.02 (ref 1–1.03)
T4 FREE SERPL-MCNC: 1.45 NG/DL (ref 0.92–1.68)
TRICYCLICS UR QL SCN: NEGATIVE
TROPONIN T SERPL HS-MCNC: 40 NG/L
TROPONIN T SERPL HS-MCNC: 43 NG/L
TSH SERPL DL<=0.05 MIU/L-ACNC: 2.39 UIU/ML (ref 0.27–4.2)
UROBILINOGEN UR QL STRIP: NORMAL
WBC NRBC COR # BLD AUTO: 6.09 10*3/MM3 (ref 3.4–10.8)
WHOLE BLOOD HOLD COAG: NORMAL
WHOLE BLOOD HOLD SPECIMEN: NORMAL

## 2024-09-09 PROCEDURE — P9612 CATHETERIZE FOR URINE SPEC: HCPCS

## 2024-09-09 PROCEDURE — 83735 ASSAY OF MAGNESIUM: CPT | Performed by: EMERGENCY MEDICINE

## 2024-09-09 PROCEDURE — 0202U NFCT DS 22 TRGT SARS-COV-2: CPT | Performed by: EMERGENCY MEDICINE

## 2024-09-09 PROCEDURE — 71045 X-RAY EXAM CHEST 1 VIEW: CPT

## 2024-09-09 PROCEDURE — 84443 ASSAY THYROID STIM HORMONE: CPT | Performed by: EMERGENCY MEDICINE

## 2024-09-09 PROCEDURE — 80306 DRUG TEST PRSMV INSTRMNT: CPT | Performed by: EMERGENCY MEDICINE

## 2024-09-09 PROCEDURE — 99284 EMERGENCY DEPT VISIT MOD MDM: CPT

## 2024-09-09 PROCEDURE — 80143 DRUG ASSAY ACETAMINOPHEN: CPT | Performed by: EMERGENCY MEDICINE

## 2024-09-09 PROCEDURE — 84484 ASSAY OF TROPONIN QUANT: CPT | Performed by: EMERGENCY MEDICINE

## 2024-09-09 PROCEDURE — 84439 ASSAY OF FREE THYROXINE: CPT | Performed by: EMERGENCY MEDICINE

## 2024-09-09 PROCEDURE — 83605 ASSAY OF LACTIC ACID: CPT | Performed by: EMERGENCY MEDICINE

## 2024-09-09 PROCEDURE — 81003 URINALYSIS AUTO W/O SCOPE: CPT | Performed by: EMERGENCY MEDICINE

## 2024-09-09 PROCEDURE — 36415 COLL VENOUS BLD VENIPUNCTURE: CPT

## 2024-09-09 PROCEDURE — 70450 CT HEAD/BRAIN W/O DYE: CPT

## 2024-09-09 PROCEDURE — 82140 ASSAY OF AMMONIA: CPT | Performed by: EMERGENCY MEDICINE

## 2024-09-09 PROCEDURE — 80053 COMPREHEN METABOLIC PANEL: CPT | Performed by: EMERGENCY MEDICINE

## 2024-09-09 PROCEDURE — 85025 COMPLETE CBC W/AUTO DIFF WBC: CPT | Performed by: EMERGENCY MEDICINE

## 2024-09-09 PROCEDURE — 93005 ELECTROCARDIOGRAM TRACING: CPT | Performed by: EMERGENCY MEDICINE

## 2024-09-09 PROCEDURE — 87040 BLOOD CULTURE FOR BACTERIA: CPT | Performed by: EMERGENCY MEDICINE

## 2024-09-09 PROCEDURE — 82077 ASSAY SPEC XCP UR&BREATH IA: CPT | Performed by: EMERGENCY MEDICINE

## 2024-09-09 PROCEDURE — 80179 DRUG ASSAY SALICYLATE: CPT | Performed by: EMERGENCY MEDICINE

## 2024-09-09 PROCEDURE — 84145 PROCALCITONIN (PCT): CPT | Performed by: EMERGENCY MEDICINE

## 2024-09-09 RX ORDER — SODIUM CHLORIDE 0.9 % (FLUSH) 0.9 %
10 SYRINGE (ML) INJECTION AS NEEDED
Status: DISCONTINUED | OUTPATIENT
Start: 2024-09-09 | End: 2024-09-09 | Stop reason: HOSPADM

## 2024-09-09 NOTE — ED PROVIDER NOTES
Subjective   History of Present Illness  64-year-old female presents for evaluation of generalized weakness and confusion.  Of note, the patient has a history of Parkinson's disease and dementia.  She lives at home.  She has had increased fatigue and lethargy when compared to baseline over the past 2 to 3 days.  Today, her confusion was increased and persistent so EMS was called.  She was not hypoglycemic.  No known fevers.  No known sick contacts.      Review of Systems   Neurological:  Positive for weakness.   Psychiatric/Behavioral:  Positive for confusion.    All other systems reviewed and are negative.      Past Medical History:   Diagnosis Date    Allergic     One shot every 2 weeks    Anxiety 10/1/2015    Arthritis Dec 2022    75 mg Twice/day    Cancer     Hyperthyroidism Pre 2013    Parkinson's disease        Allergies   Allergen Reactions    Codeine Palpitations    Epinephrine Dizziness and Other (See Comments)       Past Surgical History:   Procedure Laterality Date    BREAST SURGERY  10/5/2014    lumpectomy    COLONOSCOPY  ??? 2018    Luis Eli MD [facility]       Family History   Problem Relation Age of Onset    Cancer Mother         bladder 2022    Alcohol abuse Father          92    Cancer Maternal Aunt         breast    Stroke Sister         double stroke 2022       Social History     Socioeconomic History    Marital status:    Tobacco Use    Smoking status: Never     Passive exposure: Never    Smokeless tobacco: Never   Vaping Use    Vaping status: Never Used   Substance and Sexual Activity    Alcohol use: Yes     Alcohol/week: 14.0 standard drinks of alcohol     Types: 4 Glasses of wine, 5 Shots of liquor, 5 Drinks containing 0.5 oz of alcohol per week     Comment: denies x 3 weeks    Drug use: Never    Sexual activity: Not Currently     Partners: Male     Birth control/protection: Post-menopausal           Objective   Physical Exam  Vitals and nursing  note reviewed.   Constitutional:       Appearance: She is well-developed. She is not diaphoretic.      Comments: Chronically ill-appearing female   HENT:      Head: Normocephalic and atraumatic.   Eyes:      Pupils: Pupils are equal, round, and reactive to light.   Neck:      Comments: No meningeal signs or nuchal rigidity  Cardiovascular:      Rate and Rhythm: Normal rate and regular rhythm.      Heart sounds: Normal heart sounds. No murmur heard.     No friction rub. No gallop.   Pulmonary:      Effort: Pulmonary effort is normal. No respiratory distress.      Breath sounds: Normal breath sounds. No wheezing or rales.   Abdominal:      General: Bowel sounds are normal. There is no distension.      Palpations: Abdomen is soft. There is no mass.      Tenderness: There is no abdominal tenderness. There is no guarding or rebound.      Comments: No focal abdominal tenderness, no peritoneal signs, no pain out of proportion to exam   Genitourinary:     Comments: No CVA tenderness noted  Musculoskeletal:      Cervical back: Neck supple.      Right lower leg: No edema.      Left lower leg: No edema.   Skin:     General: Skin is warm and dry.      Findings: No erythema or rash.   Neurological:      Comments: Following simple commands in all 4 extremities, appears mildly confused   Psychiatric:         Thought Content: Thought content normal.         Procedures           ED Course  ED Course as of 09/09/24 1552   Mon Sep 09, 2024   1227 64-year-old female presents for evaluation of generalized weakness and confusion.  Of note, the patient has a history of Parkinson's disease and dementia.  She lives at home.  She has had increased fatigue and lethargy when compared to baseline over the past 2 to 3 days.  Today, her confusion was increased and persistent so EMS was called.  She was not hypoglycemic.  She was brought to our facility to be evaluated.  On arrival, the patient is chronically ill-appearing.  She is able to follow  simple commands but does appear somewhat confused.  No meningeal signs or nuchal rigidity.  Differential diagnosis is quite broad.  Nonsurgical abdomen.  We will obtain labs and imaging, and we will reassess following initial interventions. [DD]   1248 Labs are bland/unrevealing. [DD]   1257 High-sensitivity troponin is elevated at 43. [DD]   1257 EKG interpreted independently by me revealed normal sinus rhythm with a heart rate of 82 and no ST segments suggestive of or concerning for ischemia. [DD]   1349 I personally and independently viewed the patient's x-ray images myself, and I am in agreement with the radiologist's reading for final interpretation, particularly there is no pneumonia noted. [DD]   1428 I personally and independently reviewed the patient's CT images and findings, and I am in agreement with the radiologist regarding CT interpretation--particularly there is no emergent/surgical intracranial process noted. [DD]   1443 After reviewing the patient's negative workup, discussed her findings with her  at bedside.  She is much more alert and attentive now.  Mental status is back to baseline.  Doubt CNS infection.  Doubt emergent central process.  We will obtain a repeat troponin and EKG, and hopefully, we will be able to discharge the patient back to home under his care. [DD]   1521 Repeat troponin is flat at 40.  Repeat EKG revealed normal sinus rhythm with a heart rate of 81 and no ST segments suggestive of or concerning for ischemia. [DD]   1522 I feel that the patient can be discharged home at this point and safely managed on an outpatient basis.  She will follow-up with her primary care physician within the next week.  Agreeable with plan and given appropriate strict return precautions. [DD]      ED Course User Index  [DD] Lisandro Hill MD                                        Recent Results (from the past 24 hour(s))   ECG 12 Lead ED Triage Standing Order; Weak / Dizzy / AMS     Collection Time: 09/09/24 12:05 PM   Result Value Ref Range    QT Interval 380 ms    QTC Interval 443 ms   Comprehensive Metabolic Panel    Collection Time: 09/09/24 12:10 PM    Specimen: Blood   Result Value Ref Range    Glucose 99 65 - 99 mg/dL    BUN 14 8 - 23 mg/dL    Creatinine 0.71 0.57 - 1.00 mg/dL    Sodium 140 136 - 145 mmol/L    Potassium 3.8 3.5 - 5.2 mmol/L    Chloride 103 98 - 107 mmol/L    CO2 25.0 22.0 - 29.0 mmol/L    Calcium 9.2 8.6 - 10.5 mg/dL    Total Protein 6.7 6.0 - 8.5 g/dL    Albumin 4.2 3.5 - 5.2 g/dL    ALT (SGPT) 8 1 - 33 U/L    AST (SGOT) 23 1 - 32 U/L    Alkaline Phosphatase 68 39 - 117 U/L    Total Bilirubin 0.6 0.0 - 1.2 mg/dL    Globulin 2.5 gm/dL    A/G Ratio 1.7 g/dL    BUN/Creatinine Ratio 19.7 7.0 - 25.0    Anion Gap 12.0 5.0 - 15.0 mmol/L    eGFR 94.5 >60.0 mL/min/1.73   Single High Sensitivity Troponin T    Collection Time: 09/09/24 12:10 PM    Specimen: Blood   Result Value Ref Range    HS Troponin T 43 (H) <14 ng/L   Magnesium    Collection Time: 09/09/24 12:10 PM    Specimen: Blood   Result Value Ref Range    Magnesium 2.1 1.6 - 2.4 mg/dL   Green Top (Gel)    Collection Time: 09/09/24 12:10 PM   Result Value Ref Range    Extra Tube Hold for add-ons.    Lavender Top    Collection Time: 09/09/24 12:10 PM   Result Value Ref Range    Extra Tube hold for add-on    Gold Top - SST    Collection Time: 09/09/24 12:10 PM   Result Value Ref Range    Extra Tube Hold for add-ons.    Gray Top    Collection Time: 09/09/24 12:10 PM   Result Value Ref Range    Extra Tube Hold for add-ons.    Light Blue Top    Collection Time: 09/09/24 12:10 PM   Result Value Ref Range    Extra Tube Hold for add-ons.    CBC Auto Differential    Collection Time: 09/09/24 12:10 PM    Specimen: Blood   Result Value Ref Range    WBC 6.09 3.40 - 10.80 10*3/mm3    RBC 4.24 3.77 - 5.28 10*6/mm3    Hemoglobin 13.0 12.0 - 15.9 g/dL    Hematocrit 38.5 34.0 - 46.6 %    MCV 90.8 79.0 - 97.0 fL    MCH 30.7 26.6 - 33.0 pg     MCHC 33.8 31.5 - 35.7 g/dL    RDW 12.9 12.3 - 15.4 %    RDW-SD 42.9 37.0 - 54.0 fl    MPV 9.2 6.0 - 12.0 fL    Platelets 249 140 - 450 10*3/mm3    Neutrophil % 74.9 42.7 - 76.0 %    Lymphocyte % 17.6 (L) 19.6 - 45.3 %    Monocyte % 7.1 5.0 - 12.0 %    Eosinophil % 0.0 (L) 0.3 - 6.2 %    Basophil % 0.2 0.0 - 1.5 %    Immature Grans % 0.2 0.0 - 0.5 %    Neutrophils, Absolute 4.57 1.70 - 7.00 10*3/mm3    Lymphocytes, Absolute 1.07 0.70 - 3.10 10*3/mm3    Monocytes, Absolute 0.43 0.10 - 0.90 10*3/mm3    Eosinophils, Absolute 0.00 0.00 - 0.40 10*3/mm3    Basophils, Absolute 0.01 0.00 - 0.20 10*3/mm3    Immature Grans, Absolute 0.01 0.00 - 0.05 10*3/mm3    nRBC 0.0 0.0 - 0.2 /100 WBC   Lactic Acid, Plasma    Collection Time: 09/09/24 12:10 PM    Specimen: Blood   Result Value Ref Range    Lactate 0.7 0.5 - 2.0 mmol/L   Procalcitonin    Collection Time: 09/09/24 12:10 PM    Specimen: Blood   Result Value Ref Range    Procalcitonin 0.03 0.00 - 0.25 ng/mL   T4, Free    Collection Time: 09/09/24 12:10 PM    Specimen: Blood   Result Value Ref Range    Free T4 1.45 0.92 - 1.68 ng/dL   TSH    Collection Time: 09/09/24 12:10 PM    Specimen: Blood   Result Value Ref Range    TSH 2.390 0.270 - 4.200 uIU/mL   Ammonia    Collection Time: 09/09/24 12:10 PM    Specimen: Blood   Result Value Ref Range    Ammonia <10 (L) 11 - 51 umol/L   Salicylate Level    Collection Time: 09/09/24 12:10 PM    Specimen: Blood   Result Value Ref Range    Salicylate <0.3 <=30.0 mg/dL   Ethanol    Collection Time: 09/09/24 12:10 PM    Specimen: Blood   Result Value Ref Range    Ethanol <10 0 - 10 mg/dL   Acetaminophen Level    Collection Time: 09/09/24 12:10 PM    Specimen: Blood   Result Value Ref Range    Acetaminophen <5.0 0.0 - 30.0 mcg/mL   Respiratory Panel PCR w/COVID-19(SARS-CoV-2) ANEESH/TREVOR/DEVON/PAD/COR/JOSIE In-House, NP Swab in UTM/VTM, 2 HR TAT - Swab, Nasopharynx    Collection Time: 09/09/24 12:13 PM    Specimen: Nasopharynx; Swab   Result Value  Ref Range    ADENOVIRUS, PCR Not Detected Not Detected    Coronavirus 229E Not Detected Not Detected    Coronavirus HKU1 Not Detected Not Detected    Coronavirus NL63 Not Detected Not Detected    Coronavirus OC43 Not Detected Not Detected    COVID19 Not Detected Not Detected - Ref. Range    Human Metapneumovirus Not Detected Not Detected    Human Rhinovirus/Enterovirus Not Detected Not Detected    Influenza A PCR Not Detected Not Detected    Influenza B PCR Not Detected Not Detected    Parainfluenza Virus 1 Not Detected Not Detected    Parainfluenza Virus 2 Not Detected Not Detected    Parainfluenza Virus 3 Not Detected Not Detected    Parainfluenza Virus 4 Not Detected Not Detected    RSV, PCR Not Detected Not Detected    Bordetella pertussis pcr Not Detected Not Detected    Bordetella parapertussis PCR Not Detected Not Detected    Chlamydophila pneumoniae PCR Not Detected Not Detected    Mycoplasma pneumo by PCR Not Detected Not Detected   Urinalysis With Culture If Indicated - Urine, Catheter    Collection Time: 09/09/24 12:21 PM    Specimen: Urine, Catheter   Result Value Ref Range    Color, UA Yellow Yellow, Straw    Appearance, UA Clear Clear    pH, UA 6.5 5.0 - 8.0    Specific Gravity, UA 1.022 1.001 - 1.030    Glucose, UA Negative Negative    Ketones, UA Negative Negative    Bilirubin, UA Negative Negative    Blood, UA Negative Negative    Protein, UA Negative Negative    Leuk Esterase, UA Negative Negative    Nitrite, UA Negative Negative    Urobilinogen, UA 1.0 E.U./dL 0.2 - 1.0 E.U./dL   Urine Drug Screen - Urine, Catheter    Collection Time: 09/09/24 12:21 PM    Specimen: Urine, Catheter   Result Value Ref Range    THC, Screen, Urine Negative Negative    Phencyclidine (PCP), Urine Negative Negative    Cocaine Screen, Urine Negative Negative    Methamphetamine, Ur Negative Negative    Opiate Screen Negative Negative    Amphetamine Screen, Urine Negative Negative    Benzodiazepine Screen, Urine Negative  "Negative    Tricyclic Antidepressants Screen Negative Negative    Methadone Screen, Urine Negative Negative    Barbiturates Screen, Urine Negative Negative    Oxycodone Screen, Urine Negative Negative    Buprenorphine, Screen, Urine Negative Negative   ECG 12 Lead Other; repeats    Collection Time: 09/09/24  2:45 PM   Result Value Ref Range    QT Interval 378 ms    QTC Interval 439 ms   Single High Sensitivity Troponin T    Collection Time: 09/09/24  2:52 PM    Specimen: Blood   Result Value Ref Range    HS Troponin T 40 (H) <14 ng/L     Note: In addition to lab results from this visit, the labs listed above may include labs taken at another facility or during a different encounter within the last 24 hours. Please correlate lab times with ED admission and discharge times for further clarification of the services performed during this visit.    CT Head Without Contrast   Final Result   Minimal age-related changes of the brain as above, otherwise without evidence of acute intracranial abnormality.            Electronically Signed: Bart Freeman MD     9/9/2024 1:52 PM EDT     Workstation ID: [id number]      XR Chest 1 View   Final Result   Impression:   No evidence of acute cardiopulmonary disease.          Electronically Signed: Kulwinder Saunders MD     9/9/2024 1:39 PM EDT     Workstation ID: [id number]        Vitals:    09/09/24 1201 09/09/24 1430 09/09/24 1530   BP: 158/84 142/87 138/76   BP Location: Right arm     Patient Position: Sitting     Pulse: 84 83 78   Resp: 18     Temp: 99.2 °F (37.3 °C)     TempSrc: Oral     SpO2: 97% 96% 96%   Weight: 63.5 kg (140 lb)     Height: 165.1 cm (65\")       Medications   sodium chloride 0.9 % flush 10 mL (has no administration in time range)     ECG/EMG Results (last 24 hours)       Procedure Component Value Units Date/Time    ECG 12 Lead ED Triage Standing Order; Weak / Dizzy / AMS [[id number]] Collected: 09/09/24 1205     Updated: 09/09/24 1239     QT Interval 380 ms      QTC " Interval 443 ms     Narrative:      Test Reason : ED Triage Standing Order~  Blood Pressure :   */*   mmHG  Vent. Rate :  82 BPM     Atrial Rate :  82 BPM     P-R Int : 170 ms          QRS Dur :  82 ms      QT Int : 380 ms       P-R-T Axes :  62  17  40 degrees     QTc Int : 443 ms    Normal sinus rhythm  Normal ECG  Confirmed by MD Hill Michael ([id number]) on 9/9/2024 12:39:24 PM    Referred By: ed           Confirmed By: Cam Hill MD    ECG 12 Lead Other; repeats [[id number]] Collected: 09/09/24 1445     Updated: 09/09/24 1504     QT Interval 378 ms      QTC Interval 439 ms     Narrative:      Test Reason : 2nd set  Blood Pressure :   */*   mmHG  Vent. Rate :  81 BPM     Atrial Rate :  81 BPM     P-R Int : 174 ms          QRS Dur :  68 ms      QT Int : 378 ms       P-R-T Axes :  53  16  44 degrees     QTc Int : 439 ms    Normal sinus rhythm  Normal ECG  When compared with ECG of 09-SEP-2024 12:05,  No significant change was found    Referred By: mellisa           Confirmed By:           ECG 12 Lead Other; repeats   Preliminary Result   Test Reason : 2nd set   Blood Pressure :   */*   mmHG   Vent. Rate :  81 BPM     Atrial Rate :  81 BPM      P-R Int : 174 ms          QRS Dur :  68 ms       QT Int : 378 ms       P-R-T Axes :  53  16  44 degrees      QTc Int : 439 ms      Normal sinus rhythm   Normal ECG   When compared with ECG of 09-SEP-2024 12:05,   No significant change was found      Referred By: mellisa           Confirmed By:       ECG 12 Lead ED Triage Standing Order; Weak / Dizzy / AMS   Final Result   Test Reason : ED Triage Standing Order~   Blood Pressure :   */*   mmHG   Vent. Rate :  82 BPM     Atrial Rate :  82 BPM      P-R Int : 170 ms          QRS Dur :  82 ms       QT Int : 380 ms       P-R-T Axes :  62  17  40 degrees      QTc Int : 443 ms      Normal sinus rhythm   Normal ECG   Confirmed by MD Hill Michael ([id number]) on 9/9/2024 12:39:24 PM      Referred By: ed           Confirmed By: Cam Hill MD                  Medical Decision Making  Problems Addressed:  Altered mental status, unspecified altered mental status type: complicated acute illness or injury  History of dementia: complicated acute illness or injury  History of Parkinson's disease: complicated acute illness or injury    Amount and/or Complexity of Data Reviewed  Labs: ordered.  Radiology: ordered.  ECG/medicine tests: ordered.    Risk  Prescription drug management.        Final diagnoses:   Altered mental status, unspecified altered mental status type   History of dementia   History of Parkinson's disease       ED Disposition  ED Disposition       ED Disposition   Discharge    Condition   Stable    Comment   --               Janes Sanchez MD  [address] [address]  [phone]    In 1 week           Medication List      No changes were made to your prescriptions during this visit.            Lisandro Hill MD  09/09/24 2121

## 2024-09-14 LAB
BACTERIA SPEC AEROBE CULT: NORMAL
BACTERIA SPEC AEROBE CULT: NORMAL

## 2024-09-25 ENCOUNTER — OFFICE VISIT (OUTPATIENT)
Dept: FAMILY MEDICINE CLINIC | Facility: CLINIC | Age: 64
End: 2024-09-25
Payer: MEDICARE

## 2024-09-25 VITALS
SYSTOLIC BLOOD PRESSURE: 118 MMHG | HEART RATE: 81 BPM | BODY MASS INDEX: 21.79 KG/M2 | DIASTOLIC BLOOD PRESSURE: 84 MMHG | OXYGEN SATURATION: 96 % | HEIGHT: 65 IN | WEIGHT: 130.8 LBS

## 2024-09-25 DIAGNOSIS — G20.A1 MODERATE DEMENTIA DUE TO PARKINSON'S DISEASE, WITH ANXIETY: ICD-10-CM

## 2024-09-25 DIAGNOSIS — G20.B2 PARKINSON'S DISEASE WITH DYSKINESIA AND FLUCTUATING MANIFESTATIONS: Primary | ICD-10-CM

## 2024-09-25 DIAGNOSIS — R44.3 HALLUCINATIONS: ICD-10-CM

## 2024-09-25 DIAGNOSIS — F02.B4 MODERATE DEMENTIA DUE TO PARKINSON'S DISEASE, WITH ANXIETY: ICD-10-CM

## 2024-09-25 PROCEDURE — 99214 OFFICE O/P EST MOD 30 MIN: CPT | Performed by: INTERNAL MEDICINE

## 2024-09-25 PROCEDURE — 1126F AMNT PAIN NOTED NONE PRSNT: CPT | Performed by: INTERNAL MEDICINE

## 2024-09-26 ENCOUNTER — PATIENT OUTREACH (OUTPATIENT)
Dept: CASE MANAGEMENT | Facility: OTHER | Age: 64
End: 2024-09-26
Payer: MEDICARE

## 2024-09-26 ENCOUNTER — REFERRAL TRIAGE (OUTPATIENT)
Dept: CASE MANAGEMENT | Facility: OTHER | Age: 64
End: 2024-09-26
Payer: MEDICARE

## 2024-09-30 ENCOUNTER — TELEPHONE (OUTPATIENT)
Dept: FAMILY MEDICINE CLINIC | Facility: CLINIC | Age: 64
End: 2024-09-30
Payer: MEDICARE

## 2024-09-30 ENCOUNTER — PATIENT OUTREACH (OUTPATIENT)
Dept: CASE MANAGEMENT | Facility: OTHER | Age: 64
End: 2024-09-30
Payer: MEDICARE

## 2024-09-30 NOTE — TELEPHONE ENCOUNTER
Per Scheduling:     ROUTING BACK TO REFERRING: WE DO NOT SCHEDULE FOR THIS PROVIDER; DR LOPEZ @ [facility] [place] OFFICE DOES SEE FOR PARKINSONS; PLEASE ADVISE IF OK TO CHANGE. THANKS!

## 2024-09-30 NOTE — OUTREACH NOTE
AMBULATORY CASE MANAGEMENT NOTE    Names and Relationships of Patient/Support Persons: Contact: CHAPARRO WILDER; Relationship: Emergency Contact -     Patient Outreach    RN-NENA called patient, spoke with , Chaparro, regarding follow [facility].  Explained role of PEARL and Dr. Sanchez referral for ACM.  Provided the  information needed for admission to [facility] from home setting.  Provided him with the phone number to call the [facility] OutPatient clinic and set up an appointment for patient with the Out Patient Clinic Physician there, along with a PT and OT evaluation appt  on same day.   said he would call them.  Confirmed  has RN-VARSHAM phone number for any questions or assistance needed.     Care Coordination    PEARL called [facility], and spoke with Mallorie.  She said they she did not see the PCP's orders and paperwork that was faxed by the PCP office, but that it still may show up.    Betsey CEDEÑO  Ambulatory Case Management    9/30/2024, 11:56 EDT

## 2024-10-07 ENCOUNTER — PATIENT OUTREACH (OUTPATIENT)
Dept: CASE MANAGEMENT | Facility: OTHER | Age: 64
End: 2024-10-07
Payer: MEDICARE

## 2024-10-07 ENCOUNTER — TELEPHONE (OUTPATIENT)
Dept: CASE MANAGEMENT | Facility: OTHER | Age: 64
End: 2024-10-07
Payer: MEDICARE

## 2024-10-07 NOTE — OUTREACH NOTE
"AMBULATORY CASE MANAGEMENT NOTE    Names and Relationships of Patient/Support Persons: Contact: Carolina Duarte; Relationship: Self -     Care Coordination    PEARL called [facility] Out[facility], spoke with Sabine YORK, Patient  in the OutPatient Clinic at [facility], run by [facility], regarding patient's order and paperwork from PCP office for patient to have InPatient Rehab.  Sabine said she spoke with  of patient on Friday, 10/4/24, and he decided now that OutPatient Rehab at [facility] is what would be best for the patient, rather than InPatient Rehab.      Sabine said he gave the  the phone and fax number of the [facility] at [facility], and told him that the PCP office would need to send new orders to them that request OutPatient Therapy:  Phone# [phone] / Fax# [phone].  She said the PCP office will need to fax a separate order for each discipline requested along with Diagnosis related.  Said the order needs to read, \"Evaluate and Treat for Out-Patient Physical Therapy\", plus the Dx related to this;   \"Evaluate and Treat for Out-Patient Speech Therapy, for swallowing, cognitive, communication\", or what ever the specific need is under Speech Therapy, along with Dx related. - If O.T., is needed, \"Evaluate and Treat for Out-Patient Occupational Therapy\", plus the Dx related to this.  She said the orders must be signed by the doctor, and electronic signature is okay.  She said on the Cover Sheet of the faxes, to say \"Patient's notes already sent to [facility].\"  RN-ACM will contact PCP office with the above information for orders to be sent now to OutPatient Therapy at [facility].      Patient Outreach    RN-ACM called  at 12:30pm today and confirmed with him that he is now thinking that out-patient Therapy is what will be best for the patient, and he would like this to be at [facility].      Betsey CEDEÑO  Ambulatory Case " Management    10/7/2024, 13:56 EDT

## 2024-10-07 NOTE — TELEPHONE ENCOUNTER
Would need 3 separate orders for these. For Speech therapy, be specific as to why; speech, swallowing, etc.

## 2024-10-07 NOTE — TELEPHONE ENCOUNTER
"PEARL has talked with [facility] and to the  today.     Patient's  is now requesting that patient go to OutPatient Rehab at [facility], rather than InPatient Rehab.  New orders have to be sent to a different department requesting the outpatient services at [facility].    To:  NANCY. OutPatient Therapy at [facility]  Phone:  [phone]  Fax:  [phone]    A separate order was requested for each discipline ordered (PT, ST, or OT), along with the Diagnosis related; to say,  \"Evaluate and Treat for Out-Patient Physical Therapy\", plus the Dx related to this;   \"Evaluate and Treat for Out-Patient Speech Therapy, for swallowing, cognitive, communication\", (or what ever the specific need is under Speech Therapy), along with Dx related. -  If O.T., is needed, \"Evaluate and Treat for Out-Patient Occupational Therapy\", plus the Dx related to this.  [facility] said the orders must be signed by the doctor, and electronic signature is okay.  She said on the Cover Sheet of the faxes, to say \"Patient's notes already sent to [facility].\"    Thank you,    MILEY Leggett  Ambulatory   "

## 2024-10-10 DIAGNOSIS — G31.84 MCI (MILD COGNITIVE IMPAIRMENT): ICD-10-CM

## 2024-10-10 DIAGNOSIS — R13.12 OROPHARYNGEAL DYSPHAGIA: ICD-10-CM

## 2024-10-10 DIAGNOSIS — G20.B2 PARKINSON'S DISEASE WITH DYSKINESIA AND FLUCTUATING MANIFESTATIONS: Primary | ICD-10-CM

## 2024-10-11 ENCOUNTER — PATIENT OUTREACH (OUTPATIENT)
Dept: CASE MANAGEMENT | Facility: OTHER | Age: 64
End: 2024-10-11
Payer: MEDICARE

## 2024-10-11 NOTE — OUTREACH NOTE
AMBULATORY CASE MANAGEMENT NOTE    Names and Relationships of Patient/Support Persons:  -     Care Coordination    RN-ACM called [facility], OutPatient Therapy, [phone], and confirmed that they do have the recent orders from PCP office for outpatient therapies.  They said they will contact the  when they have availability to schedule her therapies' Evaluations.    Unable to reach the patient/  today.  Did leave voicemail, with RN-ACM contact information.      Betsey CEDEÑO  Ambulatory Case Management    10/11/2024, 10:18 EDT

## 2024-10-25 ENCOUNTER — PATIENT OUTREACH (OUTPATIENT)
Dept: CASE MANAGEMENT | Facility: OTHER | Age: 64
End: 2024-10-25
Payer: MEDICARE

## 2024-10-25 NOTE — OUTREACH NOTE
AMBULATORY CASE MANAGEMENT NOTE    Names and Relationships of Patient/Support Persons: Contact: DAYRON WILDER; Relationship: Emergency Contact -     Patient Outreach    RN-NENA called patient, spoke with , regarding outpatient Therapy starting at [facility].   stated that both he and patient had Covid last week, and are now feeling better.  He said that he has talked with [facility] last week, and plans to call them again today, regarding scheduling patient for the OP-Therapy.  No questions or concerns voiced.     Betsey CEDEÑO  Ambulatory Case Management    10/25/2024, 11:20 EDT

## 2024-11-06 ENCOUNTER — HOSPITAL ENCOUNTER (OUTPATIENT)
Dept: SPEECH THERAPY | Facility: HOSPITAL | Age: 64
Setting detail: THERAPIES SERIES
Discharge: HOME | End: 2024-11-06
Payer: MEDICARE

## 2024-11-06 DIAGNOSIS — R41.841 COGNITIVE COMMUNICATION DEFICIT: ICD-10-CM

## 2024-11-06 DIAGNOSIS — R47.1 DYSARTHRIA: Primary | ICD-10-CM

## 2024-11-06 PROCEDURE — 92522 EVALUATE SPEECH PRODUCTION: CPT | Performed by: SPEECH-LANGUAGE PATHOLOGIST

## 2024-11-06 NOTE — THERAPY EVALUATION
Outpatient Speech Language Pathology   Adult Voice Initial Evaluation  [facility]     Patient Name: Carolina Duarte  : 1960  MRN: [id number]  Today's Date: 2024         Visit Date: 2024   Patient Active Problem List   Diagnosis    Hyponatremia    Hx of breast cancer    Depression    Hypothyroidism    Parkinson's disease with dyskinesia and fluctuating manifestations    History of migraine headaches    Tremor    Plantar fasciitis of left foot    Pain in right knee    Malignant neoplasm of overlapping sites of right breast in female, estrogen receptor positive    Hyperlipidemia    Hyperglycemia    Awareness of heartbeats    Generalized anxiety disorder    Chronic idiopathic constipation    Altered mental status, unspecified altered mental status type    Elevated BP without diagnosis of hypertension    Vitamin D deficiency    Postmenopausal bleeding    Anxiety    Chronic jaw pain    Fatigue    Hallucinations    Chest pain        Past Medical History:   Diagnosis Date    Allergic     One shot every 2 weeks    Anxiety 10/1/2015    Arthritis Dec 2022    75 mg Twice/day    Cancer     Depression 10/1/15    90 mg. dulexatine daily    Hyperthyroidism Pre 2013    Hypothyroidism 22    Parkinson's disease     Urinary tract infection 23        Past Surgical History:   Procedure Laterality Date    BREAST SURGERY  10/5/2014    lumpectomy    COLONOSCOPY  ??? 2018    [facility] Eli [facility]         Visit Dx:    ICD-10-CM ICD-9-CM   1. Dysarthria  R47.1 784.51   2. Cognitive communication deficit  R41.841 799.52            OP SLP Assessment/Plan - 24 1300          SLP Assessment    Functional Problems Speech Language- Adult/Cognition  -RB    Impact on Function: Adult Speech Language/Cognition Difficulty communicating wants, needs, and ideas;Difficulty communicating in a medical emergency;Restrictions in personal and social life;Trouble learning or remembering new information;Poor  attention to task;Decreased recall of personal information and medical history;Difficulty participating in avocational activities;Difficulty sequencing or problem solving to complete ADLs  -RB    Clinical Impression: Speech Language-Adult/Congnition Moderate:;Cognitive Communication Impairment;Dysarthria- Hypokinetic  -RB    Functional Problems Comment Impacts QOL, participation, and communication.  -RB    Clinical Impression Comments Pt would benefit from skilled ST.  -RB    Please refer to paper survey for additional self-reported information Yes  -RB    Please refer to items scanned into chart for additional diagnostic informaiton and handouts as provided by clinician Yes  -RB    SLP Diagnosis cog/comm deficit, hypokinetic dysarthria  -RB    Prognosis Good (comment)  -RB    Patient/caregiver participated in establishment of treatment plan and goals Yes  -RB    Patient would benefit from skilled therapy intervention Yes  -RB       SLP Plan    Frequency 1-2x/weekly   Pt is going to do research on SPEAK OUT and LOUD programs, and if so that would be a 3-4x/weekly program for 4 weeks. -RB    Duration 12 weeks  -RB    Planned CPT's? SLP INDIVIDUAL SPEECH THERAPY: 37150  -RB    Expected Duration of Therapy Session (SLP Eval) 45  -RB    Plan Comments RBANS  -RB              User Key  (r) = Recorded By, (t) = Taken By, (c) = Cosigned By      Initials Name Provider Type    RB Sanjuanita Matson SLP Speech and Language Pathologist                     Voice - 11/06/24 1100          General Voice History    Reflux History No reflux reported  -RB    Alcohol Servings 1 cup a night.  -RB    Daily Water Intake 2-3 quarts of water and gatorade a day.  -RB    Daily Caffeine Intake None  -RB    Tobacco History None  -RB    Environmental Factors Exposure to smoke   parents used to smoke but no current exposure. -RB    Typical Voice Use Uses voice for ADL's  -RB    Symptoms Improved/Worsened By seasonal allergies worsen voice.  -RB     Vocal Abuse/Misuse None reported  -RB    Allergies Seasonal allergies  -RB       Voice Assessment    S/Z Ratio and Interpretation 1, normal  -RB    Maximum Phonation Time and Interpretation 14 seconds, normal  -RB    Pitch Glide Characteristics WFL  -RB    Pitch Range During Speech monopitch  -RB    Voice Onset WFL  -RB    Voice Features Observed Monopitch;Monoloudness  -RB    Resonance Characteristics WFL  -RB    Muscle Tension Observation Jaw (comment);Neck (comment);Shoulder (comment)  -RB    Breath Support- At Rest Mixed  -RB    Breath Support- During Sustained Phonation Mixed  -RB    Breath Support- During Conversation Mixed  -RB       Measures and Scales for Voice    Measures and Scales for Voice Voice Handicap Index  -RB       Voice Handicap Index    Functional Subtest Score 20  -RB    Physical Subtest Score 15  -RB    Emotional Subtest Score 30  -RB    Total Score 65   Severe rating -RB              User Key  (r) = Recorded By, (t) = Taken By, (c) = Cosigned By      Initials Name Provider Type    RB Sanjuanita Matson, SLP Speech and Language Pathologist                       SLP SLC Evaluation - 11/06/24 1100          Communication Assessment/Intervention    Document Type evaluation  -RB    Total Evaluation Minutes, SLP 45  -RB    Subjective Information no complaints  -RB    Patient Observations alert;cooperative;agree to therapy  -RB    Patient/Family/Caregiver Comments/Observations  present  -RB    Patient Effort good  -RB       General Information    Patient Profile Reviewed yes  -RB    Pertinent History Of Current Problem Pt has a history of PD and dementia. Pt and  present today for evaluation. Pt reports decreased talking volume and increasing dementia. Pt reports difficulty with both cognition and speech. Reports difficulty with short term memory, decreased focus and decreased word finding. She lives at home with her .  and caregiver assist with daily task. Pt reports she  cannot be as active now but she used to enjoy walks, tennis, vollyball, and bowling. Pt reports her hearing has worsened over time and she has some trouble with it. Pt uses glasses. Pt reports she has to repeat herself often and her speech is mumbled.  -RB    Precautions/Limitations, Vision WFL with corrective lenses  -RB    Precautions/Limitations, Hearing WFL;for purposes of eval   Pt reports hearing has worsend over time. WFL for purposes of evaluation. -RB    Plans/Goals Discussed with patient;spouse/S.O.;agreed upon  -RB    Barriers to Rehab none identified  -RB    Patient's Goals for Discharge functional cognition;functional voice  -RB    Family Goals for Discharge functional voice;functional cognition  -RB       Pain    Pretreatment Pain Rating 0/10 - no pain  -RB    Posttreatment Pain Rating 0/10 - no pain  -RB       Oral Motor Structure and Function    Oral Motor Structure and Function WFL  -RB    Dentition Assessment natural, present and adequate  -RB    Mucosal Quality moist, healthy  -RB       Oral Musculature and Cranial Nerve Assessment    Oral Motor General Assessment WFL  -RB       Motor Speech Assessment/Intervention    Motor Speech Function moderate impairment  -RB    Characteristics Consistent with Dysarthria decreased prosody;decreased intensity;slurred speech;decreased breath support  -RB    Initiation of Phonation (Communication) WFL  -RB    Automatic Speech (Communication) WFL  -RB    Verbal Repetition (Communication) WFL  -RB    Phase Completion moderate impairment  -RB    Conversational Speech (Communication) moderate impairment;simple  -RB    Speech intelligibility 100%;in quiet environment  -RB              User Key  (r) = Recorded By, (t) = Taken By, (c) = Cosigned By      Initials Name Provider Type    Sanjuanita Stokes SLP Speech and Language Pathologist                   ACTIVITY  ACCURACY ASSISTANCE NEEDED   LTGs:     Pt will be able to engage in speech at the conversational level  with maximum articulatory accuracy so that speech is understood by familiar/unfamiliar listeners at 95% no cues          Pt will self report improvement with speech intelligibility and voice projection at work, home, community with use of PROM       STG:      Pt will participate in a cognitive communication evaluation utilizing RBANS next session.               STG:     Pt will apply compensatory strategies to improve intelligibility of speech during spontaneous speech at 95% no cues                    STG:    Pt will increase vocal loudness by 6dB with min-no cues from SLP       STG:     Pt will increase vocal loudness across tasks with a single cue from SLP or communication partner at 95%          Certification: 11/06/2024- 02/04/2025       OP SLP Education       Row Name 11/06/24 1300       Education    Barriers to Learning No barriers identified  -RB    Education Provided Described results of evaluation;Patient expressed understanding of evaluation;Family/caregivers expressed understanding of evaluation;Patient participated in establishing goals and treatment plan;Family/caregivers participated in establishing goals and treatment plan  -RB    Assessed Learning needs;Learning motivation;Learning preferences;Learning readiness  -RB    Learning Motivation Strong  -RB    Learning Method Explanation;Demonstration;Teach back;Written materials  -RB    Teaching Response Verbalized understanding;Demonstrated understanding;Reinforcement needed  -RB    Education Comments HEP: vocal projection exercises, Loudness visual  -RB              User Key  (r) = Recorded By, (t) = Taken By, (c) = Cosigned By      Initials Name Effective Dates    Sanjuanita Stokes SLP 10/22/24 -                    SLP OP Goals       Row Name 11/06/24 1300          Subjective Comments    Subjective Comments Pt reports she is starting PT tomorrow at [facility].  -RB        Subjective Pain    Able to rate subjective pain? yes  -RB     Pre-Treatment  Pain Level 0  -RB     Post-Treatment Pain Level 0  -RB               User Key  (r) = Recorded By, (t) = Taken By, (c) = Cosigned By      Initials Name Provider Type    Sanjuanita Stokes SLP Speech and Language Pathologist                           Time Calculation:        Therapy Charges for Today       Code Description Service Date Service Provider Modifiers Qty    [id number] [facility] GILAL SPEECH SOUND PRODUCTION 3 11/6/2024 Sanjuanita Matson SLP GN 1            CHAI Coelho  provided treatment under my direct supervision.          [facility] Speech Language Pathology   [address]  [place] [address]  Sanjuanita Matson MA CCC-SLP [facility] license: [id number]        PHYSICIAN: Janes Sanchez MD    NPI: [id number]         I certify that the therapy services are furnished while this patient is under my care.  The services outlined above are required by this patient, and will be reviewed every 90 days.                PHYSICIAN:                                         DATE:      Please sign and return via fax to [phone].   Thank you,   [facility] SpeechTherapy.   11/6/2024

## 2024-11-11 ENCOUNTER — TELEPHONE (OUTPATIENT)
Dept: FAMILY MEDICINE CLINIC | Facility: CLINIC | Age: 64
End: 2024-11-11
Payer: MEDICARE

## 2024-11-11 ENCOUNTER — PATIENT OUTREACH (OUTPATIENT)
Dept: CASE MANAGEMENT | Facility: OTHER | Age: 64
End: 2024-11-11
Payer: MEDICARE

## 2024-11-11 NOTE — TELEPHONE ENCOUNTER
"Brandee Duarte \"Chaparro\"  P e  [address] Clinical Pool (supporting Janes Aviles MD)3 days ago          CURIOUS TO KNOW IF DR. AVILES HAS RESPONDED TO THE REFERENCED INSURANCE COMPANY QUESTIONS RE [facility]?     THANKS--BRANDEE DUARTE,P.O.A. FOR ALDO [facility]          I called to speak with patients , POA.  We have not received any forms regarding the patient. If he'd like them to be sent to us again our fax # [phone]    Please relay.    "

## 2024-11-11 NOTE — OUTREACH NOTE
AMBULATORY CASE MANAGEMENT NOTE    Names and Relationships of Patient/Support Persons: Contact: DAYRON WILDER; Relationship: Emergency Contact -     Patient Outreach    RN-NENA called patient, spoke with , Dayron, to follow up regarding set up of OutPt. Therapy services.   stated that Patient got started last week with Out-Patient PT Therapy and will be going every Tuesday and Thursday to [facility] for this.  He said Patient is going to Out-Patient Speech Therapy at [facility] on [address], every week on Wednesdays.  Discussed if extra support was needed at home with patient's care.   said he has caregivers Monday through Friday from 12:30pm to 6:00pm, and that is all that is needed.  He said they are constructing a new master bedroom on their first floor which will be helpful when completed.  Discussed importance of scheduling patient's next PCP office visit.  said he would do this.  No further Amb.Case Mgt needs.     This note will be routed to the PCP.     Betsey CEDEÑO  Ambulatory Case Management    11/11/2024, 15:38 EST

## 2024-11-12 NOTE — TELEPHONE ENCOUNTER
"Brandee Duarte \"[place]arro\"  P e  [address] Clinical Pool (supporting Janes Aviles MD)3 days ago            CURIOUS TO KNOW IF DR. AVILES HAS RESPONDED TO THE REFERENCED INSURANCE COMPANY QUESTIONS RE [facility]?     THANKS--BRANDEE DUARTE,P.O.A. FOR ALDO [facility]              I called to speak with patients , POA.  We have not received any forms regarding the patient. If he'd like them to be sent to us again our fax # [phone]     Please relay.     "

## 2024-11-13 ENCOUNTER — HOSPITAL ENCOUNTER (OUTPATIENT)
Dept: SPEECH THERAPY | Facility: HOSPITAL | Age: 64
Setting detail: THERAPIES SERIES
Discharge: HOME | End: 2024-11-13
Payer: MEDICARE

## 2024-11-13 DIAGNOSIS — R41.841 COGNITIVE COMMUNICATION DEFICIT: ICD-10-CM

## 2024-11-13 DIAGNOSIS — R47.1 DYSARTHRIA: Primary | ICD-10-CM

## 2024-11-13 PROCEDURE — 96125 COGNITIVE TEST BY HC PRO: CPT | Performed by: SPEECH-LANGUAGE PATHOLOGIST

## 2024-11-13 NOTE — THERAPY EVALUATION
Outpatient Speech Language Pathology   Adult Speech Language Cognitive Progress Note   [facility]     Patient Name: Carolina Duarte  : 1960  MRN: [id number]  Today's Date: 2024        Visit Date: 2024   Patient Active Problem List   Diagnosis    Hyponatremia    Hx of breast cancer    Depression    Hypothyroidism    Parkinson's disease with dyskinesia and fluctuating manifestations    History of migraine headaches    Tremor    Plantar fasciitis of left foot    Pain in right knee    Malignant neoplasm of overlapping sites of right breast in female, estrogen receptor positive    Hyperlipidemia    Hyperglycemia    Awareness of heartbeats    Generalized anxiety disorder    Chronic idiopathic constipation    Altered mental status, unspecified altered mental status type    Elevated BP without diagnosis of hypertension    Vitamin D deficiency    Postmenopausal bleeding    Anxiety    Chronic jaw pain    Fatigue    Hallucinations    Chest pain        Past Medical History:   Diagnosis Date    Allergic     One shot every 2 weeks    Anxiety 10/1/2015    Arthritis Dec 2022    75 mg Twice/day    Cancer     Depression 10/1/15    90 mg. dulexatine daily    Hyperthyroidism Pre 2013    Hypothyroidism 22    Parkinson's disease     Urinary tract infection 23        Past Surgical History:   Procedure Laterality Date    BREAST SURGERY  10/5/2014    lumpectomy    COLONOSCOPY  ??? 2018    Luis Eli MD twice [facility]         Visit Dx:    ICD-10-CM ICD-9-CM   1. Dysarthria  R47.1 784.51   2. Cognitive communication deficit  R41.841 799.52            OP SLP Assessment/Plan - 24 1600          SLP Assessment    Functional Problems Speech Language- Adult/Cognition  -RB    Impact on Function: Adult Speech Language/Cognition Difficulty communicating wants, needs, and ideas;Restrictions in personal and social life;Difficulty sequencing or problem solving to complete ADLs;Difficulty participating in  avocational activities;Trouble learning or remembering new information;Poor attention to task  -RB    Clinical Impression: Speech Language-Adult/Congnition Severe:;Cognitive Communication Impairment  -RB    Functional Problems Comment Impacts QOL, participation, and communication.  -RB    Clinical Impression Comments Pt would benefit from skilled ST to address vocal projection and cognitive deficits.  -RB    Please refer to paper survey for additional self-reported information Yes  -RB    Please refer to items scanned into chart for additional diagnostic informaiton and handouts as provided by clinician Yes  -RB    SLP Diagnosis cog/comm deficit  -RB    Prognosis Good (comment)  -RB    Patient/caregiver participated in establishment of treatment plan and goals Yes  -RB    Patient would benefit from skilled therapy intervention Yes  -RB       SLP Plan    Frequency 1x/week  -RB    Duration 11 weeks  -RB    Planned CPT's? SLP INDIVIDUAL SPEECH THERAPY: 92020  -RB    Expected Duration of Therapy Session (SLP Eval) 45  -RB    Plan Comments Initiate POC  -RB              User Key  (r) = Recorded By, (t) = Taken By, (c) = Cosigned By      Initials Name Provider Type    Sanjuanita Stokes SLP Speech and Language Pathologist                   SLP SLC Evaluation - 11/13/24 1500          Cognitive Assessment Intervention- SLP    Cognitive Function (Cognition) severe impairment  -RB    Orientation Status (Cognition) WFL;awareness of basic personal information;moderate impairment;time;situation  -RB    Memory (Cognitive) severe impairment;new learning;short-term;delayed;auditory;visual;unrelated  -RB    Attention (Cognitive) sustained;moderate impairment  -RB    Thought Organization (Cognitive) severe impairment;concrete divergent  -RB    Reasoning (Cognitive) simple;moderate impairment  -RB    Problem Solving (Cognitive) moderate impairment;simple  -RB    Executive Function (Cognition) severe impairment;home management  activities;complex organization;initiation  -RB    Pragmatics (Communication) mild impairment;topic maintenance  -RB    Right Hemisphere Function WFL  -RB    Cognition, Comment Pt presents with a severe cognitive impairment. Orientation status WFL for awareness of basic personal information. Moderate impairment for time and situation. Severe impairment present for memory with new learning ,delayed, immediate, auditory, visual. Mild impairment present for sustained attention. Severe  impairment for concrete divergent thought organization. Moderate impairment for simple reasoning. Moderate impairment simple problem solving. Severe impairment for executive functioning with home management task and initiation. Mild impairment for pragmatics. Right hemisphere function WFL.  -RB       Standardized Tests    Cognitive/Memory Tests RBANS: Repeatable Battery for the Assessment of Neuropsychological Status  -RB       RBANS- Repeatable Battery for the Assessment of Neuropsychological Status    Immediate Memory Index Score 40  -RB    Immediate Memory Qualitative Description extremely low  -RB    Visuospatial Index Score --   score of 11/20 for figure copy section. -RB    Language Index Score 60  -RB    Language Qualitative Description extremely low  -RB    Attention Index Score --   4/16 on digit span section. -RB              User Key  (r) = Recorded By, (t) = Taken By, (c) = Cosigned By      Initials Name Provider Type    Sanjuanita Stokes, SLP Speech and Language Pathologist                         ACTIVITY  ACCURACY ASSISTANCE NEEDED   LTGs:      Pt will be able to engage in speech at the conversational level with maximum articulatory accuracy so that speech is understood by familiar/unfamiliar listeners at 95% min cues           Pt will self report improvement with speech intelligibility and voice projection at work, home, community with use of PROM       Pt and family will implement compensatory strategies to maximize  patient’s memory function so patient can continue to participate in daily activities 80% of the time min cues         STG:       Pt will participate in a cognitive communication evaluation utilizing RBANS next session.                 MET       STG:      Pt will apply compensatory strategies to improve intelligibility of speech during spontaneous speech at 95% min cues            Pt’s memory skills will be enhanced as reported by patient by utilizing internal memory strategies to recall up to 5 pieces of information after a 5 minute delay with min cues                STG:     Pt will increase vocal loudness by 6dB with min-no cues from SLP     Pt will demonstrate improved ability to recall and summarize information by listening/reading information and  answering questions/ summarzing 80% of the time min cues             STG:     Pt will increase vocal loudness across tasks with a single cue from SLP or communication partner at 95%      Pt will utilize word finding strategies in conversation at 95% min cues             Certification: 11/06/2024- 02/04/2025     OP SLP Education       Row Name 11/13/24 1100       Education    Barriers to Learning No barriers identified  -RB    Education Provided Described results of evaluation;Patient expressed understanding of evaluation;Family/caregivers expressed understanding of evaluation;Patient participated in establishing goals and treatment plan;Family/caregivers participated in establishing goals and treatment plan  -RB    Assessed Learning needs;Learning motivation;Learning preferences;Learning readiness  -RB    Learning Motivation Strong  -RB    Learning Method Explanation;Demonstration;Teach back;Written materials  -RB    Teaching Response Verbalized understanding;Demonstrated understanding;Reinforcement needed  -RB    Education Comments HEP: comunication and memory strategies  -RB              User Key  (r) = Recorded By, (t) = Taken By, (c) = Cosigned By      Initials  "Name Effective Dates    Sanjuanita Stokes SLP 10/22/24 -                    SLP OP Goals       Row Name 11/13/24 1200          Subjective Comments    Subjective Comments Pt reports she is \"good\"  -RB        Subjective Pain    Able to rate subjective pain? yes  -RB     Pre-Treatment Pain Level 0  -RB     Post-Treatment Pain Level 0  -RB               User Key  (r) = Recorded By, (t) = Taken By, (c) = Cosigned By      Initials Name Provider Type    Sanjuanita Stokes SLP Speech and Language Pathologist                           Time Calculation:        Therapy Charges for Today       Code Description Service Date Service Provider Modifiers Qty    [id number]  ST STD COG PERF TEST PER HOUR 11/13/2024 Sanjuanita Matson SLP GN 1          Mary Dyson, CHAI , provided treatment under my direct supervision       Sanjuanita Matson MA CCC-SLP [facility]  [place] license: [id number]   11/13/2024  "

## 2024-11-13 NOTE — TELEPHONE ENCOUNTER
"Brandee Duarte \"[place]arro\"  P e  [address] Clinical Pool (supporting Janes Aviles MD)3 days ago            CURIOUS TO KNOW IF DR. AVILES HAS RESPONDED TO THE REFERENCED INSURANCE COMPANY QUESTIONS RE [facility]?     THANKS--BRANDEE DUARTE,P.O.A. FOR ALDO [facility]              I called to speak with patients , POA.  We have not received any forms regarding the patient. If he'd like them to be sent to us again our fax # [phone]     Please relay.     "

## 2024-11-20 ENCOUNTER — HOSPITAL ENCOUNTER (OUTPATIENT)
Dept: SPEECH THERAPY | Facility: HOSPITAL | Age: 64
Setting detail: THERAPIES SERIES
Discharge: HOME | End: 2024-11-20
Payer: MEDICARE

## 2024-11-20 DIAGNOSIS — R41.841 COGNITIVE COMMUNICATION DEFICIT: Primary | ICD-10-CM

## 2024-11-20 DIAGNOSIS — R47.1 DYSARTHRIA: ICD-10-CM

## 2024-11-20 PROCEDURE — 92507 TX SP LANG VOICE COMM INDIV: CPT | Performed by: SPEECH-LANGUAGE PATHOLOGIST

## 2024-11-20 NOTE — PROGRESS NOTES
Outpatient Speech Language Pathology   Adult Speech Language Cognitive Progress Note  [facility]     Patient Name: Carolina Duarte  : 1960  MRN: [id number]  Today's Date: 2024         Visit Date: 2024   Patient Active Problem List   Diagnosis    Hyponatremia    Hx of breast cancer    Depression    Hypothyroidism    Parkinson's disease with dyskinesia and fluctuating manifestations    History of migraine headaches    Tremor    Plantar fasciitis of left foot    Pain in right knee    Malignant neoplasm of overlapping sites of right breast in female, estrogen receptor positive    Hyperlipidemia    Hyperglycemia    Awareness of heartbeats    Generalized anxiety disorder    Chronic idiopathic constipation    Altered mental status, unspecified altered mental status type    Elevated BP without diagnosis of hypertension    Vitamin D deficiency    Postmenopausal bleeding    Anxiety    Chronic jaw pain    Fatigue    Hallucinations    Chest pain          Visit Dx:    ICD-10-CM ICD-9-CM   1. Cognitive communication deficit  R41.841 799.52   2. Dysarthria  R47.1 784.51        OP SLP Assessment/Plan - 24 1100          SLP Assessment    Functional Problems Speech Language- Adult/Cognition  -RB    Impact on Function: Adult Speech Language/Cognition Difficulty communicating wants, needs, and ideas;Restrictions in personal and social life;Difficulty sequencing or problem solving to complete ADLs;Difficulty participating in avocational activities;Trouble learning or remembering new information;Poor attention to task  -RB    Clinical Impression: Speech Language-Adult/Congnition Severe:;Cognitive Communication Impairment  -RB    Functional Problems Comment Impacts QOL, participation, and communication.  -RB    Clinical Impression Comments Pt began skilled ST today to addrss vocal projection and cognitive communication deficits. Pt stimulible for loud voice across all session task. Pt and  receptive to  HEP. No new questions voiced.  -RB    Please refer to paper survey for additional self-reported information Yes  -RB    Please refer to items scanned into chart for additional diagnostic informaiton and handouts as provided by clinician Yes  -RB    SLP Diagnosis cog/comm deficit  -RB    Prognosis Good (comment)  -RB    Patient/caregiver participated in establishment of treatment plan and goals Yes  -RB    Patient would benefit from skilled therapy intervention Yes  -RB       SLP Plan    Frequency 1x/week  -RB    Duration 10 weeks  -RB    Planned CPT's? SLP INDIVIDUAL SPEECH THERAPY: 51132  -RB    Expected Duration of Therapy Session (SLP Eval) 45  -RB    Plan Comments continue POC  -RB              User Key  (r) = Recorded By, (t) = Taken By, (c) = Cosigned By      Initials Name Provider Type    Sanjuanita Stokes, SLP Speech and Language Pathologist                    ACTIVITY  ACCURACY ASSISTANCE NEEDED   LTGs:      Pt will be able to engage in speech at the conversational level with maximum articulatory accuracy so that speech is understood by familiar/unfamiliar listeners at 95% min cues           Pt will self report improvement with speech intelligibility and voice projection at work, home, community with use of PROM         Pt and family will implement compensatory strategies to maximize patient’s memory function so patient can continue to participate in daily activities 80% of the time min cues  targeted speech projection                   Targeted speech ineligibility        STG:       Pt will participate in a cognitive communication evaluation utilizing RBANS next session.                 MET       STG:      Pt will apply compensatory strategies to improve intelligibility of speech during spontaneous speech at 95% min cues            Pt’s memory skills will be enhanced as reported by patient by utilizing internal memory strategies to recall up to 5 pieces of information after a 5 minute delay with min cues          targeted compensatory strategies                   Not targeted today  85%     STG:     Pt will increase vocal loudness by 6dB with min-no cues from SLP          Pt will demonstrate improved ability to recall and summarize information by listening/reading information and  answering questions/ summarzing 80% of the time min cues      targeted vocal projection   m:73  Glide: 74-76  Countin  Readin-75  Co     STG:     Pt will increase vocal loudness across tasks with a single cue from SLP or communication partner at 95%       Pt will utilize word finding strategies in conversation at 95% min cues   targeted vocal projection             Not targeted  80%        Certification: 2024- 2025         SLP OP Goals       Row Name 24 1100          Subjective Comments    Subjective Comments Pt reports she fell yesterday and has had some problems with blood pressure.  -RB        Subjective Pain    Able to rate subjective pain? yes  -RB     Pre-Treatment Pain Level 0  -RB     Post-Treatment Pain Level 0  -RB               User Key  (r) = Recorded By, (t) = Taken By, (c) = Cosigned By      Initials Name Provider Type    Sanjuanita Stokes SLP Speech and Language Pathologist                   OP SLP Education       Row Name 24 1100       Education    Barriers to Learning No barriers identified  -RB    Education Provided Described results of evaluation;Patient expressed understanding of evaluation;Family/caregivers expressed understanding of evaluation;Patient participated in establishing goals and treatment plan;Family/caregivers participated in establishing goals and treatment plan  -RB    Assessed Learning needs;Learning motivation;Learning preferences;Learning readiness  -RB    Learning Motivation Strong  -RB    Learning Method Explanation;Demonstration;Teach back;Written materials  -RB    Teaching Response Verbalized understanding;Demonstrated understanding;Reinforcement needed  -RB     Education Comments HEP: voice projection exercises, communication participation handout  -RB              User Key  (r) = Recorded By, (t) = Taken By, (c) = Cosigned By      Initials Name Effective Dates    Sanjuanita Stokes SLP 10/22/24 -                          Time Calculation:        Therapy Charges for Today       Code Description Service Date Service Provider Modifiers Qty    [id number]  ST TREATMENT SPEECH 3 11/20/2024 Sanjuanita Matson SLP GN, KX 1          Mary Dyson, CHAI  provided treatment under my direct supervision.          Sanjuanita Matson MA CCC-SLP [facility]IS  [place] license: [id number]   11/20/2024

## 2024-11-27 ENCOUNTER — HOSPITAL ENCOUNTER (OUTPATIENT)
Dept: SPEECH THERAPY | Facility: HOSPITAL | Age: 64
Setting detail: THERAPIES SERIES
Discharge: HOME | End: 2024-11-27
Payer: MEDICARE

## 2024-11-27 DIAGNOSIS — R47.1 DYSARTHRIA: ICD-10-CM

## 2024-11-27 DIAGNOSIS — R41.841 COGNITIVE COMMUNICATION DEFICIT: Primary | ICD-10-CM

## 2024-11-27 PROCEDURE — 92507 TX SP LANG VOICE COMM INDIV: CPT | Performed by: SPEECH-LANGUAGE PATHOLOGIST

## 2024-11-27 NOTE — PROGRESS NOTES
Outpatient Speech Language Pathology   Adult Speech Language Cognitive Progress Note   [facility]     Patient Name: Carolina Duarte  : 1960  MRN: [id number]  Today's Date: 2024         Visit Date: 2024   Patient Active Problem List   Diagnosis    Hyponatremia    Hx of breast cancer    Depression    Hypothyroidism    Parkinson's disease with dyskinesia and fluctuating manifestations    History of migraine headaches    Tremor    Plantar fasciitis of left foot    Pain in right knee    Malignant neoplasm of overlapping sites of right breast in female, estrogen receptor positive    Hyperlipidemia    Hyperglycemia    Awareness of heartbeats    Generalized anxiety disorder    Chronic idiopathic constipation    Altered mental status, unspecified altered mental status type    Elevated BP without diagnosis of hypertension    Vitamin D deficiency    Postmenopausal bleeding    Anxiety    Chronic jaw pain    Fatigue    Hallucinations    Chest pain          Visit Dx:    ICD-10-CM ICD-9-CM   1. Cognitive communication deficit  R41.841 799.52   2. Dysarthria  R47.1 784.51        OP SLP Assessment/Plan - 24 1100          SLP Assessment    Functional Problems Speech Language- Adult/Cognition  -RB    Impact on Function: Adult Speech Language/Cognition Difficulty communicating wants, needs, and ideas;Restrictions in personal and social life;Difficulty sequencing or problem solving to complete ADLs;Difficulty participating in avocational activities;Trouble learning or remembering new information;Poor attention to task  -RB    Clinical Impression: Speech Language-Adult/Congnition Severe:;Cognitive Communication Impairment;Moderate:;Dysarthria- Hypokinetic  -RB    Functional Problems Comment Impacts QOL, participation, and communication.  -RB    Clinical Impression Comments Pt continued skilled ST today to address vocal projection and cognitive communication deficits. Pt utilized IM strategies during recall  task. Pt stimulable for loud voice across all task during the session. Pt and  receptive to HEP. No new questions or concerns.  -RB    Please refer to paper survey for additional self-reported information Yes  -RB    Please refer to items scanned into chart for additional diagnostic informaiton and handouts as provided by clinician Yes  -RB    SLP Diagnosis cog/comm deficit, dysarthria  -RB    Prognosis Good (comment)  -RB    Patient/caregiver participated in establishment of treatment plan and goals Yes  -RB    Patient would benefit from skilled therapy intervention Yes  -RB       SLP Plan    Frequency 1x/week  -RB    Duration 9 weeks  -RB    Planned CPT's? SLP INDIVIDUAL SPEECH THERAPY: 58334  -RB    Expected Duration of Therapy Session (SLP Eval) 45  -RB    Plan Comments Continue POC  -RB              User Key  (r) = Recorded By, (t) = Taken By, (c) = Cosigned By      Initials Name Provider Type    Sanjuanita Stokes, SLP Speech and Language Pathologist                    ACTIVITY  ACCURACY ASSISTANCE NEEDED   LTGs:      Pt will be able to engage in speech at the conversational level with maximum articulatory accuracy so that speech is understood by familiar/unfamiliar listeners at 95% min cues           Pt will self report improvement with speech intelligibility and voice projection at work, home, community with use of PROM         Pt and family will implement compensatory strategies to maximize patient’s memory function so patient can continue to participate in daily activities 80% of the time min cues  targeted speech projection                           Targeted speech ineligibility           Targeted compensatory strategies   85%  min cues   STG:       Pt will participate in a cognitive communication evaluation utilizing RBANS next session.                 MET       STG:      Pt will apply compensatory strategies to improve intelligibility of speech during spontaneous speech at 95% min cues             Pt’s memory skills will be enhanced as reported by patient by utilizing internal memory strategies to recall up to 5 pieces of information after a 5 minute delay with min cues         targeted compensatory strategies                         targeted/modeled association   85%                  Names: 2/2 10 min delay   min cues                  Min cues    STG:     Pt will increase vocal loudness by 6dB with min-no cues from SLP            Pt will demonstrate improved ability to recall and summarize information by listening/reading information and  answering questions/ summarzing 80% of the time min cues                 targeted vocal projection               Targeted functional recall               m:74  Glide: 72-77  Countin  Readin            Article: 60%  no-min cues                  Mod cues   STG:     Pt will increase vocal loudness across tasks with a single cue from SLP or communication partner at 95%       Pt will utilize word finding strategies in conversation at 95% min cues   targeted vocal projection                  Not targeted  80%  min cues      Certification: 2024- 2025             SLP OP Goals       Row Name 24 1100          Subjective Comments    Subjective Comments Pt reports she is visitng family for Thanksgiving.  -RB        Subjective Pain    Able to rate subjective pain? yes  -RB     Pre-Treatment Pain Level 0  -RB     Post-Treatment Pain Level 0  -RB               User Key  (r) = Recorded By, (t) = Taken By, (c) = Cosigned By      Initials Name Provider Type    Sanjuanita Stokes SLP Speech and Language Pathologist                   OP SLP Education       Row Name 24 1100       Education    Barriers to Learning No barriers identified  -RB    Education Provided Patient demonstrated recommended strategies;Family/caregivers demonstrated recommended strategies;Patient requires further education on strategies, risks;Family/caregivers require further education on  strategies, risks  -RB    Assessed Learning needs;Learning motivation;Learning preferences;Learning readiness  -RB    Learning Motivation Strong  -RB    Learning Method Explanation;Demonstration;Teach back;Written materials  -RB    Teaching Response Verbalized understanding;Demonstrated understanding;Reinforcement needed  -RB    Education Comments HEP: voice projection exercises, progressive relaxation, association  -RB              User Key  (r) = Recorded By, (t) = Taken By, (c) = Cosigned By      Initials Name Effective Dates    RB Sanjuanita Matson SLP 10/22/24 -                          Time Calculation:        Therapy Charges for Today       Code Description Service Date Service Provider Modifiers Qty    [id number] [facility] TREATMENT SPEECH 3 11/27/2024 Sanjuanita Matson, CHAI GN, KX 1          Mary Dyson, CHAI  provided treatment under my direct supervision.        Sanjuanita Matson MA CCC-SLP [facility]IS  [place] license: [id number]    11/27/2024

## 2024-12-05 ENCOUNTER — OFFICE VISIT (OUTPATIENT)
Dept: FAMILY MEDICINE CLINIC | Facility: CLINIC | Age: 64
End: 2024-12-05
Payer: MEDICARE

## 2024-12-05 VITALS
HEIGHT: 65 IN | WEIGHT: 132.8 LBS | HEART RATE: 93 BPM | DIASTOLIC BLOOD PRESSURE: 48 MMHG | SYSTOLIC BLOOD PRESSURE: 95 MMHG | OXYGEN SATURATION: 99 % | BODY MASS INDEX: 22.13 KG/M2

## 2024-12-05 DIAGNOSIS — G90.1 DYSAUTONOMIA: ICD-10-CM

## 2024-12-05 DIAGNOSIS — R42 DIZZINESS: ICD-10-CM

## 2024-12-05 DIAGNOSIS — R26.89 BALANCE DISORDER: ICD-10-CM

## 2024-12-05 DIAGNOSIS — Z23 IMMUNIZATION DUE: ICD-10-CM

## 2024-12-05 DIAGNOSIS — G20.B2 PARKINSON'S DISEASE WITH DYSKINESIA AND FLUCTUATING MANIFESTATIONS: Primary | ICD-10-CM

## 2024-12-05 PROCEDURE — 90662 IIV NO PRSV INCREASED AG IM: CPT | Performed by: INTERNAL MEDICINE

## 2024-12-05 PROCEDURE — 99214 OFFICE O/P EST MOD 30 MIN: CPT | Performed by: INTERNAL MEDICINE

## 2024-12-05 PROCEDURE — G0008 ADMIN INFLUENZA VIRUS VAC: HCPCS | Performed by: INTERNAL MEDICINE

## 2024-12-05 PROCEDURE — 1126F AMNT PAIN NOTED NONE PRSNT: CPT | Performed by: INTERNAL MEDICINE

## 2024-12-05 RX ORDER — MIDODRINE HYDROCHLORIDE 5 MG/1
5 TABLET ORAL
Qty: 180 TABLET | Refills: 1 | Status: SHIPPED | OUTPATIENT
Start: 2024-12-05

## 2024-12-05 NOTE — ASSESSMENT & PLAN NOTE
Increase salt intake.  Increase fluid intake.  Compression stockings.  Slow progression restive position changes.  Elevate head of bed with wedge pillow at night.  Will start ProAmatine 5 mg breakfast and 6 hours later.  She indicates a possible reaction with epinephrine in 2015 causing dizziness.  There is a relative contraindication however we discussed notifying me of any perceived side effects.  I think the benefit for far outweighs the risk.  She and her  agree.

## 2024-12-05 NOTE — PROGRESS NOTES
"Carolina Duarte  1960  [id number]  Patient Care Team:  Janes Sanchez MD as PCP - General (Internal Medicine)  Betsey Rutherford RN as Ambulatory  (Population Health)    Carolina Duarte is a 64 y.o. female here today for follow up.     This patient is accompanied by their  who contributes to the history of their care.    Chief Complaint:    Chief Complaint   Patient presents with    Hypertension    Dizziness    Irregular Heart Beat    Balance Issues        History of Present Illness:  I have reviewed and/or updated the patient's past medical, past surgical, family, social history, problem list and allergies as appropriate.     P during physical therapist at [facility],  her PT  has detected her blood pressure as low, and changes mostly when she stands up. She recently has taken two falls, and instability at home when she get out of chairs or couch.  Ahs been happening more frequently.  She notices falshing lights with the lightheaeded ness. No orly syncope. She contiues with therapy at [facility].     She indicates a reaction to epinephrine which supposedly caused dizziness    At PT  BP 88/67  11:33 seated after NuStep 107/74  11:35 standing 65/53     Review of Systems   Constitutional:  Positive for fatigue.   Respiratory: Negative.     Cardiovascular:  Negative for chest pain, palpitations and leg swelling.   Neurological:  Positive for dizziness, tremors, weakness and light-headedness.       Vitals:    12/05/24 0849   BP: 95/48   Pulse: 93   SpO2: 99%   Weight: 60.2 kg (132 lb 12.8 oz)   Height: 165.1 cm (65\")     Body mass index is 22.1 kg/m².    Physical Exam  Vitals and nursing note reviewed.   Constitutional:       General: She is not in acute distress.     Appearance: She is well-developed. She is not diaphoretic.   HENT:      Head: Normocephalic and atraumatic.      Right Ear: External ear normal.      Left Ear: External ear normal.      Mouth/Throat:      Pharynx: No oropharyngeal " exudate.   Eyes:      General: No scleral icterus.        Right eye: No discharge.      Conjunctiva/sclera: Conjunctivae normal.   Neck:      Thyroid: No thyromegaly.      Vascular: No JVD.      Trachea: No tracheal deviation.   Cardiovascular:      Rate and Rhythm: Normal rate and regular rhythm.      Heart sounds: Normal heart sounds.      Comments: PMI nondisplaced  Pulmonary:      Effort: Pulmonary effort is normal.      Breath sounds: Normal breath sounds. No wheezing or rales.   Abdominal:      General: Bowel sounds are normal.      Palpations: Abdomen is soft.      Tenderness: There is no abdominal tenderness. There is no guarding or rebound.   Musculoskeletal:      Cervical back: Normal range of motion and neck supple.   Lymphadenopathy:      Cervical: No cervical adenopathy.   Skin:     General: Skin is warm and dry.      Capillary Refill: Capillary refill takes less than 2 seconds.      Coloration: Skin is not pale.      Findings: No rash.   Neurological:      Mental Status: She is alert and oriented to person, place, and time.      Motor: No abnormal muscle tone.      Coordination: Coordination normal.   Psychiatric:         Judgment: Judgment normal.         Procedures    Results Review:    I reviewed the patient's new clinical results.    Assessment/Plan:    Problem List Items Addressed This Visit       Parkinson's disease with dyskinesia and fluctuating manifestations - Primary    Balance disorder    Dizziness    Dysautonomia    Current Assessment & Plan     Increase salt intake.  Increase fluid intake.  Compression stockings.  Slow progression restive position changes.  Elevate head of bed with wedge pillow at night.  Will start ProAmatine 5 mg breakfast and 6 hours later.  She indicates a possible reaction with epinephrine in 2015 causing dizziness.  There is a relative contraindication however we discussed notifying me of any perceived side effects.  I think the benefit for far outweighs the risk.   She and her  agree.          Other Visit Diagnoses       Immunization due        Relevant Orders    Fluzone High-Dose 65+yrs (Completed)            Plan of care reviewed with patient at the conclusion of today's visit. Education was provided regarding diagnosis and management.  Patient verbalizes understanding of and agreement with management plan.    Return in about 6 weeks (around 1/16/2025).    Janes Sanchez MD      Please note than portions of this note were completed wth a Voice Recognition Program          Answers submitted by the patient for this visit:  Primary Reason for Visit (Submitted on 12/3/2024)  What is the primary reason for your visit?: Problem Not Listed  Problem not listed (Submitted on 12/3/2024)  Chief Complaint: Other medical problem  Reason for appointment: FLUCTUATING BLOOD PRESSURE  change in stool: Yes  vertigo: Yes  Onset: 1 to 6 months  Chronicity: recurrent  Frequency: weekly  Medications tried: NONE

## 2024-12-10 ENCOUNTER — HOSPITAL ENCOUNTER (EMERGENCY)
Facility: HOSPITAL | Age: 64
Discharge: NURSING, REHAB OR LONG-TERM CARE | End: 2024-12-10
Attending: EMERGENCY MEDICINE | Admitting: EMERGENCY MEDICINE
Payer: MEDICARE

## 2024-12-10 VITALS
DIASTOLIC BLOOD PRESSURE: 76 MMHG | WEIGHT: 130 LBS | OXYGEN SATURATION: 100 % | HEIGHT: 64 IN | RESPIRATION RATE: 18 BRPM | BODY MASS INDEX: 22.2 KG/M2 | TEMPERATURE: 98.6 F | SYSTOLIC BLOOD PRESSURE: 124 MMHG | HEART RATE: 73 BPM

## 2024-12-10 DIAGNOSIS — G20.A1 PARKINSON'S DISEASE, UNSPECIFIED WHETHER DYSKINESIA PRESENT, UNSPECIFIED WHETHER MANIFESTATIONS FLUCTUATE: ICD-10-CM

## 2024-12-10 DIAGNOSIS — R55 SYNCOPE, UNSPECIFIED SYNCOPE TYPE: Primary | ICD-10-CM

## 2024-12-10 DIAGNOSIS — R42 POSTURAL DIZZINESS: ICD-10-CM

## 2024-12-10 LAB
ALBUMIN SERPL-MCNC: 4.3 G/DL (ref 3.5–5.2)
ALBUMIN/GLOB SERPL: 1.6 G/DL
ALP SERPL-CCNC: 68 U/L (ref 39–117)
ALT SERPL W P-5'-P-CCNC: <5 U/L (ref 1–33)
ANION GAP SERPL CALCULATED.3IONS-SCNC: 11 MMOL/L (ref 5–15)
AST SERPL-CCNC: 17 U/L (ref 1–32)
BACTERIA UR QL AUTO: ABNORMAL /HPF
BASOPHILS # BLD AUTO: 0.03 10*3/MM3 (ref 0–0.2)
BASOPHILS NFR BLD AUTO: 0.5 % (ref 0–1.5)
BILIRUB SERPL-MCNC: 0.5 MG/DL (ref 0–1.2)
BILIRUB UR QL STRIP: NEGATIVE
BUN SERPL-MCNC: 16 MG/DL (ref 8–23)
BUN/CREAT SERPL: 16.7 (ref 7–25)
CALCIUM SPEC-SCNC: 9 MG/DL (ref 8.6–10.5)
CHLORIDE SERPL-SCNC: 99 MMOL/L (ref 98–107)
CLARITY UR: ABNORMAL
CO2 SERPL-SCNC: 28 MMOL/L (ref 22–29)
COD CRY URNS QL: ABNORMAL /HPF
COLOR UR: YELLOW
CREAT SERPL-MCNC: 0.96 MG/DL (ref 0.57–1)
D-LACTATE SERPL-SCNC: 1.6 MMOL/L (ref 0.5–2)
DEPRECATED RDW RBC AUTO: 45.9 FL (ref 37–54)
EGFRCR SERPLBLD CKD-EPI 2021: 65.8 ML/MIN/1.73
EOSINOPHIL # BLD AUTO: 0.02 10*3/MM3 (ref 0–0.4)
EOSINOPHIL NFR BLD AUTO: 0.3 % (ref 0.3–6.2)
ERYTHROCYTE [DISTWIDTH] IN BLOOD BY AUTOMATED COUNT: 13.2 % (ref 12.3–15.4)
GEN 5 1HR TROPONIN T REFLEX: 13 NG/L
GLOBULIN UR ELPH-MCNC: 2.7 GM/DL
GLUCOSE SERPL-MCNC: 117 MG/DL (ref 65–99)
GLUCOSE UR STRIP-MCNC: NEGATIVE MG/DL
HCT VFR BLD AUTO: 37.6 % (ref 34–46.6)
HGB BLD-MCNC: 12.3 G/DL (ref 12–15.9)
HGB UR QL STRIP.AUTO: NEGATIVE
HYALINE CASTS UR QL AUTO: ABNORMAL /LPF
IMM GRANULOCYTES # BLD AUTO: 0.01 10*3/MM3 (ref 0–0.05)
IMM GRANULOCYTES NFR BLD AUTO: 0.2 % (ref 0–0.5)
KETONES UR QL STRIP: ABNORMAL
LEUKOCYTE ESTERASE UR QL STRIP.AUTO: ABNORMAL
LIPASE SERPL-CCNC: 53 U/L (ref 13–60)
LYMPHOCYTES # BLD AUTO: 1.31 10*3/MM3 (ref 0.7–3.1)
LYMPHOCYTES NFR BLD AUTO: 20 % (ref 19.6–45.3)
MCH RBC QN AUTO: 31 PG (ref 26.6–33)
MCHC RBC AUTO-ENTMCNC: 32.7 G/DL (ref 31.5–35.7)
MCV RBC AUTO: 94.7 FL (ref 79–97)
MONOCYTES # BLD AUTO: 0.43 10*3/MM3 (ref 0.1–0.9)
MONOCYTES NFR BLD AUTO: 6.6 % (ref 5–12)
MUCOUS THREADS URNS QL MICRO: ABNORMAL /HPF
NEUTROPHILS NFR BLD AUTO: 4.75 10*3/MM3 (ref 1.7–7)
NEUTROPHILS NFR BLD AUTO: 72.4 % (ref 42.7–76)
NITRITE UR QL STRIP: NEGATIVE
NRBC BLD AUTO-RTO: 0 /100 WBC (ref 0–0.2)
NT-PROBNP SERPL-MCNC: 212.3 PG/ML (ref 0–900)
PH UR STRIP.AUTO: 6 [PH] (ref 5–8)
PLATELET # BLD AUTO: 240 10*3/MM3 (ref 140–450)
PMV BLD AUTO: 9 FL (ref 6–12)
POTASSIUM SERPL-SCNC: 4.1 MMOL/L (ref 3.5–5.2)
PROT SERPL-MCNC: 7 G/DL (ref 6–8.5)
PROT UR QL STRIP: ABNORMAL
RBC # BLD AUTO: 3.97 10*6/MM3 (ref 3.77–5.28)
RBC # UR STRIP: ABNORMAL /HPF
REF LAB TEST METHOD: ABNORMAL
SODIUM SERPL-SCNC: 138 MMOL/L (ref 136–145)
SP GR UR STRIP: >=1.03 (ref 1–1.03)
SQUAMOUS #/AREA URNS HPF: ABNORMAL /HPF
TROPONIN T % CHANGE: -28 %
TROPONIN T DELTA: -5 NG/L
TROPONIN T SERPL HS-MCNC: 18 NG/L
UROBILINOGEN UR QL STRIP: ABNORMAL
WBC # UR STRIP: ABNORMAL /HPF
WBC NRBC COR # BLD AUTO: 6.55 10*3/MM3 (ref 3.4–10.8)

## 2024-12-10 PROCEDURE — 84484 ASSAY OF TROPONIN QUANT: CPT | Performed by: EMERGENCY MEDICINE

## 2024-12-10 PROCEDURE — 80053 COMPREHEN METABOLIC PANEL: CPT | Performed by: EMERGENCY MEDICINE

## 2024-12-10 PROCEDURE — 85025 COMPLETE CBC W/AUTO DIFF WBC: CPT | Performed by: EMERGENCY MEDICINE

## 2024-12-10 PROCEDURE — 83880 ASSAY OF NATRIURETIC PEPTIDE: CPT | Performed by: EMERGENCY MEDICINE

## 2024-12-10 PROCEDURE — 83605 ASSAY OF LACTIC ACID: CPT | Performed by: EMERGENCY MEDICINE

## 2024-12-10 PROCEDURE — 93005 ELECTROCARDIOGRAM TRACING: CPT | Performed by: EMERGENCY MEDICINE

## 2024-12-10 PROCEDURE — 99284 EMERGENCY DEPT VISIT MOD MDM: CPT

## 2024-12-10 PROCEDURE — 36415 COLL VENOUS BLD VENIPUNCTURE: CPT

## 2024-12-10 PROCEDURE — 83690 ASSAY OF LIPASE: CPT | Performed by: EMERGENCY MEDICINE

## 2024-12-10 PROCEDURE — 81001 URINALYSIS AUTO W/SCOPE: CPT | Performed by: EMERGENCY MEDICINE

## 2024-12-10 RX ORDER — MIDODRINE HYDROCHLORIDE 5 MG/1
10 TABLET ORAL
Qty: 180 TABLET | Refills: 0 | Status: SHIPPED | OUTPATIENT
Start: 2024-12-10

## 2024-12-10 NOTE — ED PROVIDER NOTES
Subjective   History of Present Illness  Patient is a 64-year-old female presenting to the emergency department via EMS after a syncopal episode at the [facility].  Patient was standing at the desk when she passed out.  No head injury reported.  Patient does have a past history of Parkinson's, anxiety, and past history of cancer.  No other injuries reported.  EMS reports the patient was normotensive and had a normal rhythm on their initial evaluation. Patient with recent history of syncope.     History provided by:  Patient, EMS personnel and spouse      Review of Systems    Past Medical History:   Diagnosis Date    Allergic     One shot every 2 weeks    Anxiety 10/1/2015    Arthritis Dec 2022    75 mg Twice/day    Cancer     Depression 10/1/15    90 mg. dulexatine daily    Hyperthyroidism Pre 2013    Hypothyroidism 21    Parkinson's disease     Urinary tract infection 23       Allergies   Allergen Reactions    Codeine Palpitations    Epinephrine Dizziness and Other (See Comments)       Past Surgical History:   Procedure Laterality Date    BREAST SURGERY  10/5/2014    lumpectomy    COLONOSCOPY  ??? 2018    Luis Eli MD [facility]       Family History   Problem Relation Age of Onset    Cancer Mother         bladder 2022    Alcohol abuse Father          92    Early death Father         60 yr old Heart attack    Hyperlipidemia Father         3 corotoid surgeries    Cancer Maternal Aunt         breast    Stroke Sister         double stroke 2022       Social History     Socioeconomic History    Marital status:    Tobacco Use    Smoking status: Never     Passive exposure: Never    Smokeless tobacco: Never   Vaping Use    Vaping status: Never Used   Substance and Sexual Activity    Alcohol use: Not Currently     Alcohol/week: 14.0 standard drinks of alcohol     Comment: denies x 3 weeks    Drug use: Never    Sexual activity: Not  Currently     Partners: Male     Birth control/protection: Post-menopausal           Objective   Physical Exam  Vitals and nursing note reviewed.   Constitutional:       General: She is not in acute distress.     Appearance: Normal appearance. She is not toxic-appearing.   Cardiovascular:      Rate and Rhythm: Normal rate and regular rhythm.      Pulses: Normal pulses.   Pulmonary:      Effort: Pulmonary effort is normal. No respiratory distress.      Breath sounds: Normal breath sounds.   Neurological:      Mental Status: She is alert and oriented to person, place, and time.      Comments: Parkinson's exam consistent.    Psychiatric:         Mood and Affect: Mood normal.         Behavior: Behavior normal.         Procedures           ED Course  ED Course as of 12/10/24 2119   Tue Dec 10, 2024   1223 I reviewed the clinic note from Dr. Sanchez from 5 days ago for similar type presentation.  At that time midodrine was initiated for the orthostatic hypotension and associated symptoms.  See that documentation for further details. [RS]   1623 Urinalysis, Microscopic Only - Urine, Clean Catch(!)  Urine reviewed and appears contaminated.  I did talk with the patient's primary care physician who recommends increasing the ProAmatine to 10 mg twice daily. [RS]   1624 Patient with syncope likely related to her advancing Parkinson's disease.  Will plan to discharge the patient home per discussion with her primary care physician.  She is to follow-up with primary care for further evaluation and management. I have reviewed results, considerations, and diagnosis with the patient and/or their representative. Anticipatory guidance provided. Follow-up plan reviewed. Precautions for acute return for re-evaluations also reviewed. This including potential for worsening of the presenting condition and need for further evaluation, admission, and/or intervention as indicated. Opportunity to as questions provided. I advised them to return  for any concerns and stressed the importance of timely follow-up and outpatient services. They verbalized understanding.   [RS]   6820 Note to patient: The 21st Century Cures Act makes medical notes like these available to patients in the interest of transparency. However, be advised this is a medical document. It is intended as peer to peer communication. It is written in medical language and may contain abbreviations or verbiage that are unfamiliar. It may appear blunt or direct. Medical documents are intended to carry relevant information, facts as evident, and the clinical opinion of the physician/NPP.   [RS]      ED Course User Index  [RS] Senthil Armenta MD                                                       Medical Decision Making  Problems Addressed:  Parkinson's disease, unspecified whether dyskinesia present, unspecified whether manifestations fluctuate: complicated acute illness or injury  Postural dizziness: complicated acute illness or injury  Syncope, unspecified syncope type: complicated acute illness or injury    Amount and/or Complexity of Data Reviewed  Independent Historian: spouse and EMS  Labs: ordered. Decision-making details documented in ED Course.  ECG/medicine tests: ordered.  Discussion of management or test interpretation with external provider(s): Dr. Sanchez    Risk  Prescription drug management.        Final diagnoses:   Syncope, unspecified syncope type   Parkinson's disease, unspecified whether dyskinesia present, unspecified whether manifestations fluctuate   Postural dizziness       ED Disposition  ED Disposition       ED Disposition   Discharge    Condition   Stable    Comment   --               Janes Sanchez MD  [address] [facility] [address]  [phone]    Call       [facility] EMERGENCY DEPARTMENT  [address] [address] [phone]    As needed, If symptoms worsen or ANY concerns.         Medication List         Changed      midodrine 5 MG tablet  Commonly known as: PROAMATINE  Take 2 tablets by mouth 2 (Two) Times a Day Before Meals. Qam breakfast and  then 6 hrs later  What changed: how much to take               Where to Get Your Medications        These medications were sent to [facility] - [place], [place] - [address] - [phone]  - [phone]   [address], [facility] [address]      Phone: [phone]   midodrine 5 MG tablet            Senthil Armenta MD  12/10/24 7931

## 2024-12-11 ENCOUNTER — APPOINTMENT (OUTPATIENT)
Dept: SPEECH THERAPY | Facility: HOSPITAL | Age: 64
End: 2024-12-11
Payer: MEDICARE

## 2024-12-14 LAB
QT INTERVAL: 370 MS
QT INTERVAL: 372 MS
QT INTERVAL: 380 MS
QTC INTERVAL: 443 MS
QTC INTERVAL: 447 MS
QTC INTERVAL: 457 MS

## 2024-12-18 ENCOUNTER — HOSPITAL ENCOUNTER (OUTPATIENT)
Dept: SPEECH THERAPY | Facility: HOSPITAL | Age: 64
Setting detail: THERAPIES SERIES
Discharge: HOME | End: 2024-12-18
Payer: MEDICARE

## 2024-12-18 DIAGNOSIS — R41.841 COGNITIVE COMMUNICATION DEFICIT: ICD-10-CM

## 2024-12-18 DIAGNOSIS — R47.1 DYSARTHRIA: Primary | ICD-10-CM

## 2024-12-18 PROCEDURE — 92507 TX SP LANG VOICE COMM INDIV: CPT | Performed by: SPEECH-LANGUAGE PATHOLOGIST

## 2024-12-18 NOTE — THERAPY TREATMENT NOTE
Outpatient Speech Language Pathology   Adult Speech Language Cognitive Progress Note   [place]     Patient Name: Carolina Duarte  : 1960  MRN: [id number]  Today's Date: 2024         Visit Date: 2024   Patient Active Problem List   Diagnosis    Hyponatremia    Hx of breast cancer    Depression    Hypothyroidism    Parkinson's disease with dyskinesia and fluctuating manifestations    History of migraine headaches    Tremor    Plantar fasciitis of left foot    Pain in right knee    Malignant neoplasm of overlapping sites of right breast in female, estrogen receptor positive    Hyperlipidemia    Hyperglycemia    Awareness of heartbeats    Generalized anxiety disorder    Chronic idiopathic constipation    Altered mental status, unspecified altered mental status type    Elevated BP without diagnosis of hypertension    Vitamin D deficiency    Postmenopausal bleeding    Anxiety    Chronic jaw pain    Fatigue    Hallucinations    Chest pain    Balance disorder    Dizziness    Dysautonomia          Visit Dx:    ICD-10-CM ICD-9-CM   1. Dysarthria  R47.1 784.51   2. Cognitive communication deficit  R41.841 799.52        OP SLP Assessment/Plan - 24 1000          SLP Assessment    Functional Problems Speech Language- Adult/Cognition  -RB    Impact on Function: Adult Speech Language/Cognition Difficulty communicating wants, needs, and ideas;Restrictions in personal and social life;Difficulty sequencing or problem solving to complete ADLs;Difficulty participating in avocational activities;Trouble learning or remembering new information;Poor attention to task  -RB    Clinical Impression: Speech Language-Adult/Congnition Severe:;Cognitive Communication Impairment;Dysarthria- Flaccid;Moderate:;Dysarthria- Hypokinetic  -RB    Functional Problems Comment Impacts QOL, participation, and communication.  -RB    Clinical Impression Comments Pt had to go to the ED last week. She reports she is feeling better.  Targeted speech and cognitive communication today. pt requires cues for louder quality voice. PT receptive to HEP and strategies. She reports no new questions or concerns  -RB    Please refer to paper survey for additional self-reported information Yes  -RB    Please refer to items scanned into chart for additional diagnostic informaiton and handouts as provided by clinician Yes  -RB    SLP Diagnosis cog/comm deficit, dysarthria  -RB    Prognosis Good (comment)  -RB    Patient/caregiver participated in establishment of treatment plan and goals Yes  -RB    Patient would benefit from skilled therapy intervention Yes  -RB       SLP Plan    Frequency 1x/week  -RB    Duration 8 weeks  -RB    Planned CPT's? SLP INDIVIDUAL SPEECH THERAPY: 05388  -RB    Expected Duration of Therapy Session (SLP Eval) 45  -RB    Plan Comments Continue POC  -RB              User Key  (r) = Recorded By, (t) = Taken By, (c) = Cosigned By      Initials Name Provider Type    Sanjuanita Stokes, SLP Speech and Language Pathologist                          ACTIVITY  ACCURACY ASSISTANCE NEEDED   LTGs:      Pt will be able to engage in speech at the conversational level with maximum articulatory accuracy so that speech is understood by familiar/unfamiliar listeners at 95% min cues           Pt will self report improvement with speech intelligibility and voice projection at work, home, community with use of PROM         Pt and family will implement compensatory strategies to maximize patient’s memory function so patient can continue to participate in daily activities 80% of the time min cues  targeted speech projection                           Targeted speech ineligibility               Targeted compensatory strategies   85%  min cues   STG:       Pt will participate in a cognitive communication evaluation utilizing RBANS next session.                 MET       STG:      Pt will apply compensatory strategies to improve intelligibility of speech during  spontaneous speech at 95% min cues            Pt’s memory skills will be enhanced as reported by patient by utilizing internal memory strategies to recall up to 5 pieces of information after a 5 minute delay with min cues         targeted compensatory strategies                         targeted/modeled association, grouping   85%                          Names: 2/2 10 min delay  1/2 5 min delay   min cues                          Min cues    STG:     Pt will increase vocal loudness by 6dB with min-no cues from SLP            Pt will demonstrate improved ability to recall and summarize information by listening/reading information and  answering questions/ summarzing 80% of the time min cues                 targeted vocal projection                     Targeted functional recall with visual stimuli                     m:74  Glide: 75  Countin  Readin                 Article: 70%  6 min podcast: attempted  no-min cues                          Mod cues   STG:     Pt will increase vocal loudness across tasks with a single cue from SLP or communication partner at 95%       Pt will utilize word finding strategies in conversation at 95% min cues   targeted vocal projection                  Targeted in conversation  80%  min cues      Certification: 2024- 2025                       SLP OP Goals       Row Name 24 1000          Subjective Comments    Subjective Comments Pt had to go to the ED last week  -RB        Subjective Pain    Able to rate subjective pain? yes  -RB     Pre-Treatment Pain Level 0  -RB     Post-Treatment Pain Level 0  -RB               User Key  (r) = Recorded By, (t) = Taken By, (c) = Cosigned By      Initials Name Provider Type    Sanjuanita Stokes SLP Speech and Language Pathologist                   OP SLP Education       Row Name 24 1000       Education    Barriers to Learning No barriers identified  -RB    Education Provided Patient demonstrated recommended  strategies;Patient requires further education on strategies, risks  -RB    Assessed Learning needs;Learning motivation;Learning readiness;Learning preferences  -RB    Learning Motivation Strong  -RB    Learning Method Explanation;Demonstration;Teach back;Written materials  -RB    Teaching Response Verbalized understanding;Demonstrated understanding;Reinforcement needed  -RB    Education Comments HEP: voice projection tasks, grouping  -RB              User Key  (r) = Recorded By, (t) = Taken By, (c) = Cosigned By      Initials Name Effective Dates    Sanjuanita Stokes SLP 10/22/24 -                          Time Calculation:        Therapy Charges for Today       Code Description Service Date Service Provider Modifiers Qty    [id number]  ST TREATMENT SPEECH 3 12/18/2024 Sanjuanita Matson SLP GN 1                   Sanjuanita Matson MA CCC-SLP [facility]  [place] license: [id number]   12/18/2024

## 2025-01-03 ENCOUNTER — HOSPITAL ENCOUNTER (OUTPATIENT)
Dept: SPEECH THERAPY | Facility: HOSPITAL | Age: 65
Setting detail: THERAPIES SERIES
Discharge: HOME | End: 2025-01-03
Payer: MEDICARE

## 2025-01-03 DIAGNOSIS — R47.1 DYSARTHRIA: Primary | ICD-10-CM

## 2025-01-03 DIAGNOSIS — R41.841 COGNITIVE COMMUNICATION DEFICIT: ICD-10-CM

## 2025-01-03 PROCEDURE — 92507 TX SP LANG VOICE COMM INDIV: CPT | Performed by: SPEECH-LANGUAGE PATHOLOGIST

## 2025-01-03 NOTE — THERAPY TREATMENT NOTE
Outpatient Speech Language Pathology   Adult Speech Language Cognitive Progress Note  [facility]     Patient Name: Carolina Duarte  : 1960  MRN: [id number]  Today's Date: 1/3/2025         Visit Date: 2025   Patient Active Problem List   Diagnosis    Hyponatremia    Hx of breast cancer    Depression    Hypothyroidism    Parkinson's disease with dyskinesia and fluctuating manifestations    History of migraine headaches    Tremor    Plantar fasciitis of left foot    Pain in right knee    Malignant neoplasm of overlapping sites of right breast in female, estrogen receptor positive    Hyperlipidemia    Hyperglycemia    Awareness of heartbeats    Generalized anxiety disorder    Chronic idiopathic constipation    Altered mental status, unspecified altered mental status type    Elevated BP without diagnosis of hypertension    Vitamin D deficiency    Postmenopausal bleeding    Anxiety    Chronic jaw pain    Fatigue    Hallucinations    Chest pain    Balance disorder    Dizziness    Dysautonomia          Visit Dx:    ICD-10-CM ICD-9-CM   1. Dysarthria  R47.1 784.51   2. Cognitive communication deficit  R41.841 799.52        OP SLP Assessment/Plan - 25 1000          SLP Assessment    Functional Problems Speech Language- Adult/Cognition  -RB    Impact on Function: Adult Speech Language/Cognition Difficulty communicating wants, needs, and ideas;Restrictions in personal and social life;Difficulty sequencing or problem solving to complete ADLs;Difficulty participating in avocational activities;Trouble learning or remembering new information;Poor attention to task  -RB    Clinical Impression: Speech Language-Adult/Congnition Severe:;Cognitive Communication Impairment;Moderate:;Dysarthria- Hypokinetic  -RB    Functional Problems Comment Impacts QOL, participation, and communication.  -RB    Clinical Impression Comments PT able to produce a louder quality voice with less cues today. increased dbl across tasks.  Pt receptive to strategies and treatment. NO new questions or concerns reported today  -RB    Please refer to paper survey for additional self-reported information Yes  -RB    Please refer to items scanned into chart for additional diagnostic informaiton and handouts as provided by clinician Yes  -RB    SLP Diagnosis cog/comm deficit  -RB    Prognosis Good (comment)  -RB    Patient/caregiver participated in establishment of treatment plan and goals Yes  -RB    Patient would benefit from skilled therapy intervention Yes  -RB       SLP Plan    Frequency 1x/week  -RB    Duration 7 weeks  -RB    Planned CPT's? SLP INDIVIDUAL SPEECH THERAPY: 89212  -RB    Expected Duration of Therapy Session (SLP Eval) 45  -RB    Plan Comments Continue POC  -RB              User Key  (r) = Recorded By, (t) = Taken By, (c) = Cosigned By      Initials Name Provider Type    Sanjuanita Stokes SLP Speech and Language Pathologist                        ACTIVITY  ACCURACY ASSISTANCE NEEDED   LTGs:      Pt will be able to engage in speech at the conversational level with maximum articulatory accuracy so that speech is understood by familiar/unfamiliar listeners at 95% min cues           Pt will self report improvement with speech intelligibility and voice projection at work, home, community with use of PROM         Pt and family will implement compensatory strategies to maximize patient’s memory function so patient can continue to participate in daily activities 80% of the time min cues  targeted speech projection                           Targeted speech ineligibility               Targeted compensatory strategies   85%                                      75%  min cues                                      Min cues   STG:       Pt will participate in a cognitive communication evaluation utilizing RBANS next session.                 MET       STG:      Pt will apply compensatory strategies to improve intelligibility of speech during spontaneous  speech at 95% min cues            Pt’s memory skills will be enhanced as reported by patient by utilizing internal memory strategies to recall up to 5 pieces of information after a 5 minute delay with min cues         targeted compensatory strategies                         targeted/modeled association, grouping, visualization    85%                          Names: 2/2 10 min delay    min cues                          Min cues    STG:     Pt will increase vocal loudness by 6dB with min-no cues from SLP            Pt will demonstrate improved ability to recall and summarize information by listening/reading information and  answering questions/ summarzing 80% of the time min cues                 targeted vocal projection                     Targeted functional recall with visual stimuli                     m:77  Glide: 77-80  Countin-80  Readin-82                 Article: 70%    no-min cues                          Mod cues   STG:     Pt will increase vocal loudness across tasks with a single cue from SLP or communication partner at 95%       Pt will utilize word finding strategies in conversation at 95% min cues   targeted vocal projection                  Targeted in conversation  85%  min cues      Certification: 2024- 2025                            SLP OP Goals       Row Name 25 1000          Subjective Comments    Subjective Comments PT reports she had a good holiday  -RB        Subjective Pain    Able to rate subjective pain? yes  -RB     Pre-Treatment Pain Level 0  -RB     Post-Treatment Pain Level 0  -RB               User Key  (r) = Recorded By, (t) = Taken By, (c) = Cosigned By      Initials Name Provider Type    Sanjuanita Stokes SLP Speech and Language Pathologist                   OP SLP Education       Row Name 25 1000       Education    Barriers to Learning No barriers identified  -RB    Education Provided Patient participated in establishing goals and treatment  plan;Patient demonstrated recommended strategies;Patient requires further education on strategies, risks  -RB    Assessed Learning needs;Learning motivation;Learning readiness;Learning preferences  -RB    Learning Motivation Strong  -RB    Learning Method Explanation;Demonstration;Teach back;Written materials  -RB    Teaching Response Verbalized understanding;Demonstrated understanding;Reinforcement needed  -RB    Education Comments HEP: voice tasks, visualization  -RB              User Key  (r) = Recorded By, (t) = Taken By, (c) = Cosigned By      Initials Name Effective Dates    Sanjuanita Stokes SLP 10/22/24 -                          Time Calculation:        Therapy Charges for Today       Code Description Service Date Service Provider Modifiers Qty    [id number]  ST TREATMENT SPEECH 3 1/3/2025 Sanjuanita Matson SLP GN 1                   Sanjuanita Matson MA CCC-SLP [facility]  [place] license: [id number]   1/3/2025

## 2025-01-10 ENCOUNTER — HOSPITAL ENCOUNTER (OUTPATIENT)
Dept: SPEECH THERAPY | Facility: HOSPITAL | Age: 65
Setting detail: THERAPIES SERIES
Discharge: HOME | End: 2025-01-10
Payer: MEDICARE

## 2025-01-10 DIAGNOSIS — R47.1 DYSARTHRIA: ICD-10-CM

## 2025-01-10 DIAGNOSIS — R41.841 COGNITIVE COMMUNICATION DEFICIT: Primary | ICD-10-CM

## 2025-01-10 PROCEDURE — 92507 TX SP LANG VOICE COMM INDIV: CPT | Performed by: SPEECH-LANGUAGE PATHOLOGIST

## 2025-01-10 NOTE — THERAPY TREATMENT NOTE
Outpatient Speech Language Pathology   Adult Speech Language Cognitive Progress Note   [facility]     Patient Name: Carolina Duarte  : 1960  MRN: [id number]  Today's Date: 1/10/2025         Visit Date: 01/10/2025   Patient Active Problem List   Diagnosis    Hyponatremia    Hx of breast cancer    Depression    Hypothyroidism    Parkinson's disease with dyskinesia and fluctuating manifestations    History of migraine headaches    Tremor    Plantar fasciitis of left foot    Pain in right knee    Malignant neoplasm of overlapping sites of right breast in female, estrogen receptor positive    Hyperlipidemia    Hyperglycemia    Awareness of heartbeats    Generalized anxiety disorder    Chronic idiopathic constipation    Altered mental status, unspecified altered mental status type    Elevated BP without diagnosis of hypertension    Vitamin D deficiency    Postmenopausal bleeding    Anxiety    Chronic jaw pain    Fatigue    Hallucinations    Chest pain    Balance disorder    Dizziness    Dysautonomia          Visit Dx:    ICD-10-CM ICD-9-CM   1. Cognitive communication deficit  R41.841 799.52   2. Dysarthria  R47.1 784.51        OP SLP Assessment/Plan - 01/10/25 1300          SLP Assessment    Functional Problems Speech Language- Adult/Cognition  -RB    Impact on Function: Adult Speech Language/Cognition Difficulty communicating wants, needs, and ideas;Restrictions in personal and social life;Difficulty sequencing or problem solving to complete ADLs;Difficulty participating in avocational activities;Trouble learning or remembering new information;Poor attention to task  -RB    Clinical Impression: Speech Language-Adult/Congnition Severe:;Cognitive Communication Impairment;Moderate:;Dysarthria- Hypokinetic  -RB    Functional Problems Comment Impacts QOL, participation, and communication.  -RB    Clinical Impression Comments Targeted voice projection and cognitive communication today. last two sessions pt has been  "able to produce and maintain a louder quality voice with less cues. Pt reports she has been consistent with her HEP. Pt notes at times she can't get the words out that she is thinking in her mind and just \"freezes\". targeted functional recall and pt benefits from verbal prompts and cues.  -RB    Please refer to paper survey for additional self-reported information Yes  -RB    Please refer to items scanned into chart for additional diagnostic informaiton and handouts as provided by clinician Yes  -RB    SLP Diagnosis cog/comm deficit  -RB    Prognosis Good (comment)  -RB    Patient/caregiver participated in establishment of treatment plan and goals Yes  -RB    Patient would benefit from skilled therapy intervention Yes  -RB       SLP Plan    Frequency 1x/week  -RB    Duration 6 weeks  -RB    Planned CPT's? SLP INDIVIDUAL SPEECH THERAPY: 44216  -RB    Expected Duration of Therapy Session (SLP Eval) 45  -RB    Plan Comments Continue POC  -RB              User Key  (r) = Recorded By, (t) = Taken By, (c) = Cosigned By      Initials Name Provider Type    RB Sanjuanita Matson, SLP Speech and Language Pathologist                                ACTIVITY  ACCURACY ASSISTANCE NEEDED   LTGs:      Pt will be able to engage in speech at the conversational level with maximum articulatory accuracy so that speech is understood by familiar/unfamiliar listeners at 95% min cues           Pt will self report improvement with speech intelligibility and voice projection at work, home, community with use of PROM         Pt and family will implement compensatory strategies to maximize patient’s memory function so patient can continue to participate in daily activities 80% of the time min cues  targeted speech projection                           Targeted speech ineligibility, voice projection               Targeted compensatory strategies, functional recall tasks   85%                                                        75%  min cues      "                                                   Min cues   STG:       Pt will participate in a cognitive communication evaluation utilizing RBANS next session.                 MET       STG:      Pt will apply compensatory strategies to improve intelligibility of speech during spontaneous speech at 95% min cues            Pt’s memory skills will be enhanced as reported by patient by utilizing internal memory strategies to recall up to 5 pieces of information after a 5 minute delay with min cues         targeted compensatory strategies                         targeted/modeled association, grouping, visualization, rehearsal   85%                          Names: 2/2 10 min delay, 1/2 (different set of names) 5 min delay     min cues                          Min cues    STG:     Pt will increase vocal loudness by 6dB with min-no cues from SLP            Pt will demonstrate improved ability to recall and summarize information by listening/reading information and  answering questions/ summarzing 80% of the time min cues                 targeted vocal projection                     Targeted functional recall with visual stimuli                     m:77-78  Glide: 80-87  Countin-82  Readin-82                 Article: 50%     no-min cues                          Mod cues   STG:     Pt will increase vocal loudness across tasks with a single cue from SLP or communication partner at 95%       Pt will utilize word finding strategies in conversation at 95% min cues   targeted vocal projection                  Targeted in conversation  85-90%              80%  no-min cues               Min-mod cues   Certification: 2024- 2025                          SLP OP Goals       Row Name 01/10/25 1300          Subjective Comments    Subjective Comments Pt reports she has been staying inside with the weather  -RB        Subjective Pain    Able to rate subjective pain? yes  -RB     Pre-Treatment Pain Level 0  -RB      Post-Treatment Pain Level 0  -RB               User Key  (r) = Recorded By, (t) = Taken By, (c) = Cosigned By      Initials Name Provider Type    Sanjuanita Stokes SLP Speech and Language Pathologist                   OP SLP Education       Row Name 01/10/25 1300       Education    Barriers to Learning No barriers identified  -RB    Education Provided Patient participated in establishing goals and treatment plan;Patient demonstrated recommended strategies;Patient requires further education on strategies, risks  -RB    Assessed Learning needs;Learning motivation;Learning readiness;Learning preferences  -RB    Learning Motivation Strong  -RB    Learning Method Explanation;Demonstration;Teach back;Written materials  -RB    Teaching Response Verbalized understanding;Demonstrated understanding;Reinforcement needed  -RB    Education Comments HEP: voice projection tasks, rehearsal  -RB              User Key  (r) = Recorded By, (t) = Taken By, (c) = Cosigned By      Initials Name Effective Dates    Sanjuanita Stokes SLP 10/22/24 -                          Time Calculation:        Therapy Charges for Today       Code Description Service Date Service Provider Modifiers Qty    88799354847  ST TREATMENT SPEECH 3 1/10/2025 Sanjuanita Matson SLP GN 1               Sanjuanita Matson MA CCC-SLP [facility]  [place] license: [id number]    1/10/2025

## 2025-01-17 ENCOUNTER — HOSPITAL ENCOUNTER (OUTPATIENT)
Dept: SPEECH THERAPY | Facility: HOSPITAL | Age: 65
Setting detail: THERAPIES SERIES
Discharge: HOME | End: 2025-01-17
Payer: MEDICARE

## 2025-01-17 DIAGNOSIS — R41.841 COGNITIVE COMMUNICATION DEFICIT: ICD-10-CM

## 2025-01-17 DIAGNOSIS — R47.1 DYSARTHRIA: Primary | ICD-10-CM

## 2025-01-17 PROCEDURE — 92507 TX SP LANG VOICE COMM INDIV: CPT | Performed by: SPEECH-LANGUAGE PATHOLOGIST

## 2025-01-17 NOTE — THERAPY TREATMENT NOTE
Outpatient Speech Language Pathology   Adult Speech Language Cognitive Progress Note   [facility]     Patient Name: Carolina Duarte  : 1960  MRN: [id number]  Today's Date: 2025         Visit Date: 2025   Patient Active Problem List   Diagnosis    Hyponatremia    Hx of breast cancer    Depression    Hypothyroidism    Parkinson's disease with dyskinesia and fluctuating manifestations    History of migraine headaches    Tremor    Plantar fasciitis of left foot    Pain in right knee    Malignant neoplasm of overlapping sites of right breast in female, estrogen receptor positive    Hyperlipidemia    Hyperglycemia    Awareness of heartbeats    Generalized anxiety disorder    Chronic idiopathic constipation    Altered mental status, unspecified altered mental status type    Elevated BP without diagnosis of hypertension    Vitamin D deficiency    Postmenopausal bleeding    Anxiety    Chronic jaw pain    Fatigue    Hallucinations    Chest pain    Balance disorder    Dizziness    Dysautonomia          Visit Dx:    ICD-10-CM ICD-9-CM   1. Dysarthria  R47.1 784.51   2. Cognitive communication deficit  R41.841 799.52        OP SLP Assessment/Plan - 25 0900          SLP Assessment    Functional Problems Speech Language- Adult/Cognition  -RB    Impact on Function: Adult Speech Language/Cognition Difficulty communicating wants, needs, and ideas;Restrictions in personal and social life;Difficulty sequencing or problem solving to complete ADLs;Difficulty participating in avocational activities;Trouble learning or remembering new information;Poor attention to task  -RB    Clinical Impression: Speech Language-Adult/Congnition Severe:;Cognitive Communication Impairment;Moderate:;Dysarthria- Hypokinetic  -RB    Functional Problems Comment Impacts QOL, participation, and communication.  -RB    Clinical Impression Comments Pt continued skilled ST today to address vocal projection and cognitive communication  deficits. PT continues to need less prompts and cues for loud quality voice. She reports following HEP at home consistently and notes louder voice at home. targeted functional recall. Pt continues to need  cues for recall tasks. Pt continues to benefit from skilled ST. She has been making great progress towards dysarthria goals.  -RB    Please refer to paper survey for additional self-reported information Yes  -RB    Please refer to items scanned into chart for additional diagnostic informaiton and handouts as provided by clinician Yes  -RB    SLP Diagnosis cog/comm deficit  -RB    Prognosis Good (comment)  -RB    Patient/caregiver participated in establishment of treatment plan and goals Yes  -RB    Patient would benefit from skilled therapy intervention Yes  -RB       SLP Plan    Frequency 1x/week  -RB    Duration 5 weeks  -RB    Planned CPT's? SLP INDIVIDUAL SPEECH THERAPY: 79440  -RB    Expected Duration of Therapy Session (SLP Eval) 45  -RB    Plan Comments Continue POC  -RB              User Key  (r) = Recorded By, (t) = Taken By, (c) = Cosigned By      Initials Name Provider Type    RB Sanjuanita Matson, SLP Speech and Language Pathologist                        ACTIVITY  ACCURACY ASSISTANCE NEEDED    LTGs:      Pt will be able to engage in speech at the conversational level with maximum articulatory accuracy so that speech is understood by familiar/unfamiliar listeners at 95% min cues           Pt will self report improvement with speech intelligibility and voice projection at work, home, community with use of PROM                 Pt and family will implement compensatory strategies to maximize patient’s memory function so patient can continue to participate in daily activities 80% of the time min cues  targeted speech projection, compensatory strategies                           Targeted speech ineligibility, voice projection, ongoing               Targeted compensatory strategies, functional recall tasks ,  external aid use  85-90%                                                                  75%  no-min cues                                                                  Min cues   STG:       Pt will participate in a cognitive communication evaluation utilizing RBANS next session.                 MET       STG:      Pt will apply compensatory strategies to improve intelligibility of speech during spontaneous speech at 95% min cues            Pt’s memory skills will be enhanced as reported by patient by utilizing internal memory strategies to recall up to 5 pieces of information after a 5 minute delay with min cues         targeted compensatory strategies                         targeted/modeled association, grouping, visualization, rehearsal, elaboration    90%                          Names: 2/2 10 min delay, 2/2 (different set of names) 5 min delay     no-min cues                          No-Min cues    STG:     Pt will increase vocal loudness by 6dB with min-no cues from SLP            Pt will demonstrate improved ability to recall and summarize information by listening/reading information and  answering questions/ summarzing 80% of the time min cues                 targeted vocal projection MET x1                    Targeted functional recall with visual stimuli                     m:78-80  Glide: 80  Countin-80  Readin-82                 Article: 65%  4 min podcast:60%      no-min cues                          Mod cues   STG:     Pt will increase vocal loudness across tasks with a single cue from SLP or communication partner at 95%       Pt will utilize word finding strategies in conversation at 95% min cues   targeted vocal projection                  Targeted in conversation, modeled response elaboration training  90%                    80%  no-min cues                    Min cues   Certification: 2024- 2025                  SLP OP Goals       Row Name 25 0900          Subjective  Comments    Subjective Comments PT reports she is ready for Spring  -RB        Subjective Pain    Able to rate subjective pain? yes  -RB     Pre-Treatment Pain Level 0  -RB     Post-Treatment Pain Level 0  -RB               User Key  (r) = Recorded By, (t) = Taken By, (c) = Cosigned By      Initials Name Provider Type    RB Sanjuanita Matson SLP Speech and Language Pathologist                   OP SLP Education       Row Name 01/17/25 0900       Education    Barriers to Learning No barriers identified  -RB    Education Provided Patient participated in establishing goals and treatment plan;Patient demonstrated recommended strategies;Patient requires further education on strategies, risks  -RB    Assessed Learning needs;Learning motivation;Learning readiness;Learning preferences  -RB    Learning Motivation Strong  -RB    Learning Method Explanation;Demonstration;Teach back;Written materials  -RB    Teaching Response Verbalized understanding;Demonstrated understanding;Reinforcement needed  -RB    Education Comments HEP: voice projection, elaboration, RET  -RB              User Key  (r) = Recorded By, (t) = Taken By, (c) = Cosigned By      Initials Name Effective Dates    RB Sanjuanita Matson SLP 10/22/24 -                          Time Calculation:        Therapy Charges for Today       Code Description Service Date Service Provider Modifiers Qty    [id number]  ST TREATMENT SPEECH 3 1/17/2025 Sanjuanita Matson SLP GN 1               Sanjuanita Matson MA CCC-SLP [facility]  [place] license: [id number]   1/17/2025

## 2025-01-22 ENCOUNTER — HOSPITAL ENCOUNTER (OUTPATIENT)
Dept: SPEECH THERAPY | Facility: HOSPITAL | Age: 65
Setting detail: THERAPIES SERIES
Discharge: HOME | End: 2025-01-22
Payer: MEDICARE

## 2025-01-22 DIAGNOSIS — R47.1 DYSARTHRIA: Primary | ICD-10-CM

## 2025-01-22 DIAGNOSIS — R41.841 COGNITIVE COMMUNICATION DEFICIT: ICD-10-CM

## 2025-01-22 PROCEDURE — 92507 TX SP LANG VOICE COMM INDIV: CPT | Performed by: SPEECH-LANGUAGE PATHOLOGIST

## 2025-01-22 NOTE — THERAPY TREATMENT NOTE
Outpatient Speech Language Pathology   Adult Speech Language Cognitive Treatment Note   [facility]     Patient Name: Carolina Duarte  : 1960  MRN: [id number]  Today's Date: 2025         Visit Date: 2025   Patient Active Problem List   Diagnosis    Hyponatremia    Hx of breast cancer    Depression    Hypothyroidism    Parkinson's disease with dyskinesia and fluctuating manifestations    History of migraine headaches    Tremor    Plantar fasciitis of left foot    Pain in right knee    Malignant neoplasm of overlapping sites of right breast in female, estrogen receptor positive    Hyperlipidemia    Hyperglycemia    Awareness of heartbeats    Generalized anxiety disorder    Chronic idiopathic constipation    Altered mental status, unspecified altered mental status type    Elevated BP without diagnosis of hypertension    Vitamin D deficiency    Postmenopausal bleeding    Anxiety    Chronic jaw pain    Fatigue    Hallucinations    Chest pain    Balance disorder    Dizziness    Dysautonomia          Visit Dx:    ICD-10-CM ICD-9-CM   1. Dysarthria  R47.1 784.51   2. Cognitive communication deficit  R41.841 799.52        OP SLP Assessment/Plan - 25 1000          SLP Assessment    Functional Problems Speech Language- Adult/Cognition  -RB    Impact on Function: Adult Speech Language/Cognition Difficulty communicating wants, needs, and ideas;Restrictions in personal and social life;Difficulty sequencing or problem solving to complete ADLs;Difficulty participating in avocational activities;Trouble learning or remembering new information;Poor attention to task  -RB    Clinical Impression: Speech Language-Adult/Congnition Severe:;Cognitive Communication Impairment  -RB    Functional Problems Comment Impacts QOL, participation, and communication.  -RB    Clinical Impression Comments Pt continued skilled ST today to address vocal projection and cognitive communication deficits.PT continues to have louder  quality voice with less prompts and is at times requires no cues or prompts for loud and projected voice. Targeted functional recall strategies.  -RB    Please refer to paper survey for additional self-reported information Yes  -RB    Please refer to items scanned into chart for additional diagnostic informaiton and handouts as provided by clinician Yes  -RB    SLP Diagnosis cog/comm deficit  -RB    Prognosis Good (comment)  -RB    Patient/caregiver participated in establishment of treatment plan and goals Yes  -RB    Patient would benefit from skilled therapy intervention Yes  -RB       SLP Plan    Frequency 1x/week  -RB    Duration 4 weeks  -RB    Planned CPT's? SLP INDIVIDUAL SPEECH THERAPY: 39893  -RB    Expected Duration of Therapy Session (SLP Eval) 45  -RB    Plan Comments Continue POC  -RB              User Key  (r) = Recorded By, (t) = Taken By, (c) = Cosigned By      Initials Name Provider Type    Sanjuanita Stokes, SLP Speech and Language Pathologist                        ACTIVITY  ACCURACY ASSISTANCE NEEDED     LTGs:      Pt will be able to engage in speech at the conversational level with maximum articulatory accuracy so that speech is understood by familiar/unfamiliar listeners at 95% min cues           Pt will self report improvement with speech intelligibility and voice projection at work, home, community with use of PROM                     Pt and family will implement compensatory strategies to maximize patient’s memory function so patient can continue to participate in daily activities 80% of the time min cues  targeted speech projection, compensatory strategies                           Targeted speech ineligibility, voice projection, ongoing               Targeted compensatory strategies, functional recall tasks , external aid use  90%                                                                       75%  no-min cues                                                                       Min  cues   STG:       Pt will participate in a cognitive communication evaluation utilizing RBANS next session.                 MET       STG:      Pt will apply compensatory strategies to improve intelligibility of speech during spontaneous speech at 95% min cues            Pt’s memory skills will be enhanced as reported by patient by utilizing internal memory strategies to recall up to 5 pieces of information after a 5 minute delay with min cues         targeted compensatory strategies                         targeted/modeled association, grouping, visualization, rehearsal, elaboration    90%                          Names: 2/2 10 min delay     no-min cues                          No-Min cues    STG:     Pt will increase vocal loudness by 6dB with min-no cues from SLP            Pt will demonstrate improved ability to recall and summarize information by listening/reading information and  answering questions/ summarzing 80% of the time min cues                 targeted vocal projection MET                     Targeted functional recall with visual stimuli                     m:78-80  Glide: 80-82  Countin-82  Readin-83                 Article: 65%       no-min cues                          Mod cues   STG:     Pt will increase vocal loudness across tasks with a single cue from SLP or communication partner at 95%       Pt will utilize word finding strategies in conversation at 95% min cues   targeted vocal projection MET                  Targeted in conversation, modeled response elaboration training , SFA 95%                    80%  no-min cues                    Min cues   Certification: 2024- 2025                   SLP OP Goals       Row Name 25 1000          Subjective Comments    Subjective Comments Pt reports it is a cold day  -RB        Subjective Pain    Able to rate subjective pain? yes  -RB     Pre-Treatment Pain Level 0  -RB     Post-Treatment Pain Level 0  -RB               User  Key  (r) = Recorded By, (t) = Taken By, (c) = Cosigned By      Initials Name Provider Type    Sanjuanita Stokes SLP Speech and Language Pathologist                   OP SLP Education       Row Name 01/22/25 1000       Education    Barriers to Learning No barriers identified  -RB    Education Provided Patient participated in establishing goals and treatment plan;Patient demonstrated recommended strategies;Patient requires further education on strategies, risks  -RB    Assessed Learning needs;Learning motivation;Learning readiness;Learning preferences  -RB    Learning Motivation Strong  -RB    Learning Method Explanation;Demonstration;Teach back;Written materials  -RB    Teaching Response Verbalized understanding;Demonstrated understanding;Reinforcement needed  -RB    Education Comments HEP: SFA, self awareness  -RB              User Key  (r) = Recorded By, (t) = Taken By, (c) = Cosigned By      Initials Name Effective Dates    Sanjuanita Stokes SLP 10/22/24 -                          Time Calculation:        Therapy Charges for Today       Code Description Service Date Service Provider Modifiers Qty    84208236941 HC ST TREATMENT SPEECH 3 1/22/2025 Sanjuanita Matson SLP GN, KX 1                   Sanjuanita Matson MA CCC-SLP [facility]  [place] license: [id number]   1/22/2025

## 2025-01-29 RX ORDER — LEVOTHYROXINE SODIUM 75 UG/1
75 TABLET ORAL
Qty: 90 TABLET | Refills: 2 | Status: SHIPPED | OUTPATIENT
Start: 2025-01-29

## 2025-01-29 NOTE — TELEPHONE ENCOUNTER
Rx Refill Note  Requested Prescriptions     Pending Prescriptions Disp Refills    levothyroxine (SYNTHROID, LEVOTHROID) 75 MCG tablet       Sig: Take 1 tablet by mouth Every Morning.      Last office visit with prescribing clinician: 12/5/2024   Last telemedicine visit with prescribing clinician: Visit date not found   Next office visit with prescribing clinician: Visit date not found                         Would you like a call back once the refill request has been completed: [] Yes [] No    If the office needs to give you a call back, can they leave a voicemail: [] Yes [] No    Anna Freeman MA  01/29/25, 08:22 EST

## 2025-01-31 ENCOUNTER — HOSPITAL ENCOUNTER (OUTPATIENT)
Dept: SPEECH THERAPY | Facility: HOSPITAL | Age: 65
Setting detail: THERAPIES SERIES
Discharge: HOME | End: 2025-01-31
Payer: MEDICARE

## 2025-01-31 DIAGNOSIS — R41.841 COGNITIVE COMMUNICATION DEFICIT: Primary | ICD-10-CM

## 2025-01-31 DIAGNOSIS — R47.1 DYSARTHRIA: ICD-10-CM

## 2025-01-31 PROCEDURE — 92507 TX SP LANG VOICE COMM INDIV: CPT | Performed by: SPEECH-LANGUAGE PATHOLOGIST

## 2025-01-31 NOTE — THERAPY RE-EVALUATION
Outpatient Speech Language Pathology   Adult Speech Language Cognitive Reassessment   [facility]     Patient Name: Carolina Duarte  : 1960  MRN: [id number]  Today's Date: 2025         Visit Date: 2025   Patient Active Problem List   Diagnosis    Hyponatremia    Hx of breast cancer    Depression    Hypothyroidism    Parkinson's disease with dyskinesia and fluctuating manifestations    History of migraine headaches    Tremor    Plantar fasciitis of left foot    Pain in right knee    Malignant neoplasm of overlapping sites of right breast in female, estrogen receptor positive    Hyperlipidemia    Hyperglycemia    Awareness of heartbeats    Generalized anxiety disorder    Chronic idiopathic constipation    Altered mental status, unspecified altered mental status type    Elevated BP without diagnosis of hypertension    Vitamin D deficiency    Postmenopausal bleeding    Anxiety    Chronic jaw pain    Fatigue    Hallucinations    Chest pain    Balance disorder    Dizziness    Dysautonomia          Visit Dx:    ICD-10-CM ICD-9-CM   1. Cognitive communication deficit  R41.841 799.52   2. Dysarthria  R47.1 784.51        OP SLP Assessment/Plan - 25 1100          SLP Assessment    Functional Problems Speech Language- Adult/Cognition  -RB    Impact on Function: Adult Speech Language/Cognition Difficulty communicating wants, needs, and ideas;Restrictions in personal and social life;Difficulty sequencing or problem solving to complete ADLs;Difficulty participating in avocational activities;Trouble learning or remembering new information;Poor attention to task  -RB    Clinical Impression: Speech Language-Adult/Congnition Severe:;Cognitive Communication Impairment;Moderate:;Dysarthria- Hypokinetic  -RB    Functional Problems Comment Impacts QOL, participation, and communication.  -RB    Clinical Impression Comments Pt continued skilled ST today to address vocal projection and cognitive communication  deficits. PT demonstrating a louder voice quality without prompts in conversation. Pt has made great voice gains. continues to require prompts for cognitive tasks  -RB    Please refer to paper survey for additional self-reported information Yes  -RB    Please refer to items scanned into chart for additional diagnostic informaiton and handouts as provided by clinician Yes  -RB    SLP Diagnosis cog/comm deficit  -RB    Prognosis Good (comment)  -RB    Patient/caregiver participated in establishment of treatment plan and goals Yes  -RB    Patient would benefit from skilled therapy intervention Yes  -RB       SLP Plan    Frequency 1x/week  -RB    Duration 3 weeks  -RB    Planned CPT's? SLP INDIVIDUAL SPEECH THERAPY: 88209  -RB    Expected Duration of Therapy Session (SLP Eval) 45  -RB    Plan Comments Continue POC  -RB              User Key  (r) = Recorded By, (t) = Taken By, (c) = Cosigned By      Initials Name Provider Type    Sanjuanita Stokes, SLP Speech and Language Pathologist                    ACTIVITY  ACCURACY ASSISTANCE NEEDED     LTGs:      Pt will be able to engage in speech at the conversational level with maximum articulatory accuracy so that speech is understood by familiar/unfamiliar listeners at 95% min cues           Pt will self report improvement with speech intelligibility and voice projection at work, home, community with use of PROM                     Pt and family will implement compensatory strategies to maximize patient’s memory function so patient can continue to participate in daily activities 80% of the time min cues  targeted speech projection, compensatory strategies    MET                  Targeted speech ineligibility, voice projection, MET              Targeted compensatory strategies, functional recall tasks , external aid use  95%                                                                         75%  no-min cues                                                                        Min cues   STG:       Pt will participate in a cognitive communication evaluation utilizing RBANS next session.                 MET       STG:      Pt will apply compensatory strategies to improve intelligibility of speech during spontaneous speech at 95% min cues            Pt’s memory skills will be enhanced as reported by patient by utilizing internal memory strategies to recall up to 5 pieces of information after a 5 minute delay with min cues         targeted compensatory strategies MET                        targeted/modeled association, grouping, visualization, rehearsal, elaboration    95%                          Names: 2/2 10 min delay     no-min cues                          No-Min cues    STG:     Pt will increase vocal loudness by 6dB with min-no cues from SLP            Pt will demonstrate improved ability to recall and summarize information by listening/reading information and  answering questions/ summarzing 80% of the time min cues                  MET                     Targeted functional recall with visual stimuli                                      Article: 65%                                  Mod cues   STG:     Pt will increase vocal loudness across tasks with a single cue from SLP or communication partner at 95%       Pt will utilize word finding strategies in conversation at 95% min cues   MET                  Targeted in conversation, modeled response elaboration training , SFA                     80%                      Min cues   Certification: 11/06/2024- 02/04/2025                  SLP SLC Evaluation - 01/31/25 1200          Motor Speech Assessment/Intervention    Motor Speech Function mild impairment;moderate impairment  -RB    Characteristics Consistent with Dysarthria decreased prosody;decreased intensity;slurred speech;decreased breath support  -RB    Initiation of Phonation (Communication) WFL  -RB    Automatic Speech (Communication) WFL  -RB    Verbal Repetition  (Communication) WFL  -RB    Phase Completion moderate impairment;mild impairment  -RB    Conversational Speech (Communication) moderate impairment;simple  -RB    Motor Speech, Comment pt has made great gains with voice projection and increasing speech intelligiblity  -RB       Cognitive Assessment Intervention- SLP    Cognitive Function (Cognition) severe impairment  -RB    Orientation Status (Cognition) WFL;awareness of basic personal information;moderate impairment;time;situation  -RB    Memory (Cognitive) severe impairment;new learning;short-term;delayed;auditory;visual;unrelated  -RB    Attention (Cognitive) sustained;moderate impairment  -RB    Thought Organization (Cognitive) severe impairment;concrete divergent  -RB    Reasoning (Cognitive) simple;moderate impairment  -RB    Problem Solving (Cognitive) moderate impairment;simple  -RB    Executive Function (Cognition) severe impairment;home management activities;complex organization;initiation  -RB    Pragmatics (Communication) mild impairment;topic maintenance  -RB    Right Hemisphere Function WNL  -RB              User Key  (r) = Recorded By, (t) = Taken By, (c) = Cosigned By      Initials Name Provider Type    Sanjuanita Stokes SLP Speech and Language Pathologist                                   SLP OP Goals       Row Name 01/31/25 1100          Subjective Comments    Subjective Comments Pt reports she is sleepy today  -RB        Subjective Pain    Able to rate subjective pain? yes  -RB     Pre-Treatment Pain Level 0  -RB     Post-Treatment Pain Level 0  -RB               User Key  (r) = Recorded By, (t) = Taken By, (c) = Cosigned By      Initials Name Provider Type    Sanjuanita Stokes SLP Speech and Language Pathologist                   OP SLP Education       Row Name 01/31/25 1100       Education    Barriers to Learning No barriers identified  -RB    Education Provided Patient participated in establishing goals and treatment plan;Patient demonstrated  recommended strategies;Family/caregivers participated in establishing goals and treatment plan;Family/caregivers demonstrated recommended strategies;Patient requires further education on strategies, risks;Family/caregivers require further education on strategies, risks  -RB    Assessed Learning needs;Learning motivation;Learning readiness;Learning preferences  -RB    Learning Motivation Strong  -RB    Learning Method Explanation;Demonstration;Teach back;Written materials  -RB    Teaching Response Verbalized understanding;Demonstrated understanding;Reinforcement needed  -RB    Education Comments HEP: self monitoring  -RB              User Key  (r) = Recorded By, (t) = Taken By, (c) = Cosigned By      Initials Name Effective Dates    Sanjuanita Stokes SLP 10/22/24 -                          Time Calculation:        Therapy Charges for Today       Code Description Service Date Service Provider Modifiers Qty    [id number]  ST TREATMENT SPEECH 3 1/31/2025 Sanjuanita Matson SLP GN 1             [facility] Language [facility]   [address]  [place] [address]  Sanjuanita Matson MA CCC-SLP [facility] license: [id number]        PHYSICIAN: Janes Sanchez MD    NPI: [id number]         I certify that the therapy services are furnished while this patient is under my care.  The services outlined above are required by this patient, and will be reviewed every 90 days.                PHYSICIAN:                                         DATE:      Please sign and return via fax to [phone].   Thank you,   [facility].     1/31/2025

## 2025-02-11 ENCOUNTER — HOSPITAL ENCOUNTER (OUTPATIENT)
Dept: SPEECH THERAPY | Facility: HOSPITAL | Age: 65
Setting detail: THERAPIES SERIES
Discharge: HOME | End: 2025-02-11
Payer: MEDICARE

## 2025-02-11 DIAGNOSIS — R41.841 COGNITIVE COMMUNICATION DEFICIT: ICD-10-CM

## 2025-02-11 DIAGNOSIS — R47.1 DYSARTHRIA: Primary | ICD-10-CM

## 2025-02-11 PROCEDURE — 92507 TX SP LANG VOICE COMM INDIV: CPT | Performed by: SPEECH-LANGUAGE PATHOLOGIST

## 2025-02-11 NOTE — THERAPY RE-EVALUATION
Outpatient Speech Language Pathology   Adult Speech Language Cognitive Re-Assessment  [facility]     Patient Name: Carolina Duarte  : 1960  MRN: [id number]  Today's Date: 2025        Visit Date: 2025   Patient Active Problem List   Diagnosis    Hyponatremia    Hx of breast cancer    Depression    Hypothyroidism    Parkinson's disease with dyskinesia and fluctuating manifestations    History of migraine headaches    Tremor    Plantar fasciitis of left foot    Pain in right knee    Malignant neoplasm of overlapping sites of right breast in female, estrogen receptor positive    Hyperlipidemia    Hyperglycemia    Awareness of heartbeats    Generalized anxiety disorder    Chronic idiopathic constipation    Altered mental status, unspecified altered mental status type    Elevated BP without diagnosis of hypertension    Vitamin D deficiency    Postmenopausal bleeding    Anxiety    Chronic jaw pain    Fatigue    Hallucinations    Chest pain    Balance disorder    Dizziness    Dysautonomia        Past Medical History:   Diagnosis Date    Allergic     One shot every 2 weeks    Anxiety 10/1/2015    Arthritis Dec 2022    75 mg Twice/day    Cancer     Depression 10/1/15    90 mg. dulexatine daily    Hyperthyroidism Pre 2013    Hypothyroidism 21    Parkinson's disease     Urinary tract infection 23        Past Surgical History:   Procedure Laterality Date    BREAST SURGERY  10/5/2014    lumpectomy    COLONOSCOPY  ??? 2018    Luis Eli MD twice [facility]         Visit Dx:    ICD-10-CM ICD-9-CM   1. Dysarthria  R47.1 784.51   2. Cognitive communication deficit  R41.841 799.52            OP SLP Assessment/Plan - 25 1200          SLP Assessment    Functional Problems Speech Language- Adult/Cognition  -RB    Impact on Function: Adult Speech Language/Cognition Difficulty communicating wants, needs, and ideas;Restrictions in personal and social life;Difficulty sequencing or problem  solving to complete ADLs;Difficulty participating in avocational activities;Trouble learning or remembering new information;Poor attention to task  -RB    Clinical Impression: Speech Language-Adult/Congnition Severe:;Cognitive Communication Impairment  -RB    Functional Problems Comment Impacts QOL, participation, and communication.  -RB    Clinical Impression Comments Pt's voice projection in conversation without cues has improved greatly since initial evaluation. Targeted voice and cognitive communication. Continues to need cues and prompts for cognitive tasks. PT motivated in sessions. Receptive to HEP.  -RB    Please refer to paper survey for additional self-reported information Yes  -RB    Please refer to items scanned into chart for additional diagnostic informaiton and handouts as provided by clinician Yes  -RB    SLP Diagnosis cog/comm deficit  -RB    Prognosis Good (comment)  -RB    Patient/caregiver participated in establishment of treatment plan and goals Yes  -RB    Patient would benefit from skilled therapy intervention Yes  -RB       SLP Plan    Frequency 1x/week  -RB    Duration 4 weeks  -RB    Planned CPT's? SLP INDIVIDUAL SPEECH THERAPY: 12779  -RB    Expected Duration of Therapy Session (SLP Eval) 45  -RB    Plan Comments added visits  -RB              User Key  (r) = Recorded By, (t) = Taken By, (c) = Cosigned By      Initials Name Provider Type    RB Sanjuanita Matson SLP Speech and Language Pathologist                     SLP SLC Evaluation - 02/11/25 1200          Motor Speech Assessment/Intervention    Motor Speech Function mild impairment;moderate impairment  -RB    Characteristics Consistent with Dysarthria decreased prosody;decreased intensity;slurred speech;decreased breath support  -RB    Initiation of Phonation (Communication) WFL  -RB    Automatic Speech (Communication) WFL  -RB    Verbal Repetition (Communication) WFL  -RB    Phase Completion moderate impairment  -RB    Conversational  Speech (Communication) moderate impairment;simple  -RB    Speech intelligibility 100%  -RB    Motor Speech, Comment pt has made significant gains with voice projection in sessions. has met several goals  -RB       Cognitive Assessment Intervention- SLP    Cognitive Function (Cognition) severe impairment  -RB    Orientation Status (Cognition) WFL;awareness of basic personal information  -RB    Memory (Cognitive) severe impairment;new learning;immediate;short-term;delayed;auditory;functional;visual  -RB    Attention (Cognitive) moderate impairment;sustained  -RB    Thought Organization (Cognitive) severe impairment;concrete divergent  -RB    Reasoning (Cognitive) moderate impairment;simple  -RB    Problem Solving (Cognitive) moderate impairment;simple  -RB    Executive Function (Cognition) severe impairment;complex organization;home management activities;self-monitoring/correction;planning;deficit awareness;judgement;initiation  -RB    Pragmatics (Communication) WNL  -RB    Right Hemisphere Function WNL  -RB              User Key  (r) = Recorded By, (t) = Taken By, (c) = Cosigned By      Initials Name Provider Type    Sanjuanita Stokes SLP Speech and Language Pathologist                         ACTIVITY  ACCURACY ASSISTANCE NEEDED     LTGs:      Pt will be able to engage in speech at the conversational level with maximum articulatory accuracy so that speech is understood by familiar/unfamiliar listeners at 95% min cues           Pt will self report improvement with speech intelligibility and voice projection at work, home, community with use of PROM                     Pt and family will implement compensatory strategies to maximize patient’s memory function so patient can continue to participate in daily activities 80% of the time min cues            MET                      MET              Targeted compensatory strategies, functional recall tasks , external aid use                                                                         75%                                                                      Min cues   STG:       Pt will participate in a cognitive communication evaluation utilizing RBANS next session.                 MET       STG:      Pt will apply compensatory strategies to improve intelligibility of speech during spontaneous speech at 95% min cues            Pt’s memory skills will be enhanced as reported by patient by utilizing internal memory strategies to recall up to 5 pieces of information after a 5 minute delay with min cues           MET                        targeted/modeled association, grouping, visualization, rehearsal, elaboration                              Names: 2/2 10 min delay, 0/2 trial 2 5 min delay                               No-Min cues    STG:     Pt will increase vocal loudness by 6dB with min-no cues from SLP            Pt will demonstrate improved ability to recall and summarize information by listening/reading information and  answering questions/ summarzing 80% of the time min cues                  MET                     Targeted functional recall with visual stimuli                                      Article: 65%  Podcast: 50%                                  Mod cues   STG:     Pt will increase vocal loudness across tasks with a single cue from SLP or communication partner at 95%       Pt will utilize word finding strategies in conversation at 95% min cues   MET                  Targeted in conversation, modeled response elaboration training , SFA                      80%                      Min cues   Certification: 2/4/25-5/3/25                     OP SLP Education       Row Name 02/11/25 1200       Education    Barriers to Learning No barriers identified  -RB    Education Provided Patient participated in establishing goals and treatment plan;Patient demonstrated recommended strategies;Patient requires further education on strategies, risks  -RB    Assessed Learning  "needs;Learning motivation;Learning readiness;Learning preferences  -RB    Learning Motivation Strong  -RB    Learning Method Explanation;Demonstration;Teach back;Written materials  -RB    Teaching Response Verbalized understanding;Demonstrated understanding;Reinforcement needed  -RB    Education Comments HEP: SWAPS  -RB              User Key  (r) = Recorded By, (t) = Taken By, (c) = Cosigned By      Initials Name Effective Dates    Sanjuanita Stokes SLP 10/22/24 -                    SLP OP Goals       Row Name 02/11/25 1200          Subjective Comments    Subjective Comments Pt reports she is doing \"good\"  -RB        Subjective Pain    Able to rate subjective pain? yes  -RB     Pre-Treatment Pain Level 0  -RB     Post-Treatment Pain Level 0  -RB               User Key  (r) = Recorded By, (t) = Taken By, (c) = Cosigned By      Initials Name Provider Type    Sanjuanita Stokes SLP Speech and Language Pathologist                           Time Calculation:        Therapy Charges for Today       Code Description Service Date Service Provider Modifiers Qty    [id number]  ST TREATMENT SPEECH 3 2/11/2025 Sanjuanita Matson SLP GN 1             [facility] Speech Language Pathology   [address]  [place] [address]  Sanjuanita Matson MA CCC-SLP [facility] license: [id number]        PHYSICIAN: Janes Sanchez MD    NPI: [id number]         I certify that the therapy services are furnished while this patient is under my care.  The services outlined above are required by this patient, and will be reviewed every 90 days.                PHYSICIAN:                                         DATE:      Please sign and return via fax to [phone].   Thank you,   [facility].     2/11/2025  "

## 2025-02-18 ENCOUNTER — HOSPITAL ENCOUNTER (OUTPATIENT)
Dept: SPEECH THERAPY | Facility: HOSPITAL | Age: 65
Setting detail: THERAPIES SERIES
Discharge: HOME | End: 2025-02-18
Payer: MEDICARE

## 2025-02-18 DIAGNOSIS — R41.841 COGNITIVE COMMUNICATION DEFICIT: Primary | ICD-10-CM

## 2025-02-18 DIAGNOSIS — R47.1 DYSARTHRIA: ICD-10-CM

## 2025-02-18 PROCEDURE — 92507 TX SP LANG VOICE COMM INDIV: CPT | Performed by: SPEECH-LANGUAGE PATHOLOGIST

## 2025-02-18 NOTE — THERAPY TREATMENT NOTE
Outpatient Speech Language Pathology   Adult Speech Language Cognitive Treatment Note   [place]     Patient Name: Carolina Duarte  : 1960  MRN: [id number]  Today's Date: 2025         Visit Date: 2025   Patient Active Problem List   Diagnosis    Hyponatremia    Hx of breast cancer    Depression    Hypothyroidism    Parkinson's disease with dyskinesia and fluctuating manifestations    History of migraine headaches    Tremor    Plantar fasciitis of left foot    Pain in right knee    Malignant neoplasm of overlapping sites of right breast in female, estrogen receptor positive    Hyperlipidemia    Hyperglycemia    Awareness of heartbeats    Generalized anxiety disorder    Chronic idiopathic constipation    Altered mental status, unspecified altered mental status type    Elevated BP without diagnosis of hypertension    Vitamin D deficiency    Postmenopausal bleeding    Anxiety    Chronic jaw pain    Fatigue    Hallucinations    Chest pain    Balance disorder    Dizziness    Dysautonomia          Visit Dx:    ICD-10-CM ICD-9-CM   1. Cognitive communication deficit  R41.841 799.52   2. Dysarthria  R47.1 784.51        OP SLP Assessment/Plan - 25 1200          SLP Assessment    Functional Problems Speech Language- Adult/Cognition  -RB    Impact on Function: Adult Speech Language/Cognition Difficulty communicating wants, needs, and ideas;Restrictions in personal and social life;Difficulty sequencing or problem solving to complete ADLs;Difficulty participating in avocational activities;Trouble learning or remembering new information;Poor attention to task  -RB    Clinical Impression: Speech Language-Adult/Congnition Severe:;Cognitive Communication Impairment  -RB    Functional Problems Comment Impacts QOL, participation, and communication.  -RB    Clinical Impression Comments Pt continued skilled ST today to address vocal projection and cognitive communication deficits. PT has met voice projection  goals and these have been carrying over each session. Continues to need prompts for memory tasks. No new concerns  -RB    Please refer to paper survey for additional self-reported information Yes  -RB    Please refer to items scanned into chart for additional diagnostic informaiton and handouts as provided by clinician Yes  -RB    SLP Diagnosis cog/comm deficit  -RB    Prognosis Good (comment)  -RB    Patient/caregiver participated in establishment of treatment plan and goals Yes  -RB    Patient would benefit from skilled therapy intervention Yes  -RB       SLP Plan    Frequency 1x/week  -RB    Duration 3 weeks  -RB    Planned CPT's? SLP INDIVIDUAL SPEECH THERAPY: 84944  -RB    Expected Duration of Therapy Session (SLP Eval) 45  -RB    Plan Comments Continue POC  -RB              User Key  (r) = Recorded By, (t) = Taken By, (c) = Cosigned By      Initials Name Provider Type    Sanjuanita Stokes, SLP Speech and Language Pathologist                           ACTIVITY  ACCURACY  ASSISTANCE NEEDED   LTGs:      Pt will be able to engage in speech at the conversational level with maximum articulatory accuracy so that speech is understood by familiar/unfamiliar listeners at 95% min cues           Pt will self report improvement with speech intelligibility and voice projection at work, home, community with use of PROM                     Pt and family will implement compensatory strategies to maximize patient’s memory function so patient can continue to participate in daily activities 80% of the time min cues              MET                        MET              Targeted compensatory strategies, functional recall tasks , external aid use                                                                         75%                                                                      Min cues   STG:       Pt will participate in a cognitive communication evaluation utilizing RBANS next session.                 MET       STG:       Pt will apply compensatory strategies to improve intelligibility of speech during spontaneous speech at 95% min cues            Pt’s memory skills will be enhanced as reported by patient by utilizing internal memory strategies to recall up to 5 pieces of information after a 5 minute delay with min cues            MET                        targeted/modeled association, grouping, visualization, rehearsal, elaboration                              Names: 1/2 5 min delay                               No-Min cues    STG:     Pt will increase vocal loudness by 6dB with min-no cues from SLP            Pt will demonstrate improved ability to recall and summarize information by listening/reading information and  answering questions/ summarzing 80% of the time min cues                  MET                     Targeted functional recall with visual stimuli                                      Article: 70%                                    Mod cues   STG:     Pt will increase vocal loudness across tasks with a single cue from SLP or communication partner at 95%       Pt will utilize word finding strategies in conversation at 95% min cues   MET                  Targeted in conversation, modeled response elaboration training , SFA                      85%                      Min cues   Certification: 2/4/25-5/3/25                       SLP OP Goals       Row Name 02/18/25 1200          Subjective Comments    Subjective Comments Pt reports it has been cold  -RB        Subjective Pain    Able to rate subjective pain? yes  -RB     Pre-Treatment Pain Level 0  -RB     Post-Treatment Pain Level 0  -RB               User Key  (r) = Recorded By, (t) = Taken By, (c) = Cosigned By      Initials Name Provider Type    Sanjuanita Stokes SLP Speech and Language Pathologist                   OP SLP Education       Row Name 02/18/25 1200       Education    Barriers to Learning No barriers identified  -RB    Education Provided Patient  participated in establishing goals and treatment plan;Patient demonstrated recommended strategies;Patient requires further education on strategies, risks  -RB    Assessed Learning needs;Learning motivation;Learning readiness;Learning preferences  -RB    Learning Motivation Strong  -RB    Learning Method Explanation;Demonstration;Teach back;Written materials  -RB    Teaching Response Verbalized understanding;Demonstrated understanding;Reinforcement needed  -RB    Education Comments HEP:reasoning  -RB              User Key  (r) = Recorded By, (t) = Taken By, (c) = Cosigned By      Initials Name Effective Dates    Sanjuanita Stokes SLP 10/22/24 -                          Time Calculation:        Therapy Charges for Today       Code Description Service Date Service Provider Modifiers Qty    [id number]  ST TREATMENT SPEECH 3 2/18/2025 Sanjuanita Matson SLP GN 1                 Sanjuanita Matson MA CCC-SLP [facility]  [place] license: [id number]   2/18/2025

## 2025-03-04 ENCOUNTER — HOSPITAL ENCOUNTER (OUTPATIENT)
Dept: SPEECH THERAPY | Facility: HOSPITAL | Age: 65
Setting detail: THERAPIES SERIES
Discharge: HOME | End: 2025-03-04
Payer: MEDICARE

## 2025-03-04 DIAGNOSIS — R41.841 COGNITIVE COMMUNICATION DEFICIT: Primary | ICD-10-CM

## 2025-03-04 DIAGNOSIS — R47.1 DYSARTHRIA: ICD-10-CM

## 2025-03-04 PROCEDURE — 92507 TX SP LANG VOICE COMM INDIV: CPT | Performed by: SPEECH-LANGUAGE PATHOLOGIST

## 2025-03-04 NOTE — THERAPY TREATMENT NOTE
Outpatient Speech Language Pathology   Adult Speech Language Cognitive Progress Note   [place]     Patient Name: Carolina Duarte  : 1960  MRN: [id number]  Today's Date: 3/4/2025         Visit Date: 2025   Patient Active Problem List   Diagnosis    Hyponatremia    Hx of breast cancer    Depression    Hypothyroidism    Parkinson's disease with dyskinesia and fluctuating manifestations    History of migraine headaches    Tremor    Plantar fasciitis of left foot    Pain in right knee    Malignant neoplasm of overlapping sites of right breast in female, estrogen receptor positive    Hyperlipidemia    Hyperglycemia    Awareness of heartbeats    Generalized anxiety disorder    Chronic idiopathic constipation    Altered mental status, unspecified altered mental status type    Elevated BP without diagnosis of hypertension    Vitamin D deficiency    Postmenopausal bleeding    Anxiety    Chronic jaw pain    Fatigue    Hallucinations    Chest pain    Balance disorder    Dizziness    Dysautonomia          Visit Dx:    ICD-10-CM ICD-9-CM   1. Cognitive communication deficit  R41.841 799.52   2. Dysarthria  R47.1 784.51        OP SLP Assessment/Plan - 25 1100          SLP Assessment    Functional Problems Speech Language- Adult/Cognition  -RB    Impact on Function: Adult Speech Language/Cognition Difficulty communicating wants, needs, and ideas;Restrictions in personal and social life;Difficulty sequencing or problem solving to complete ADLs;Difficulty participating in avocational activities;Trouble learning or remembering new information;Poor attention to task  -RB    Clinical Impression: Speech Language-Adult/Congnition Severe:;Cognitive Communication Impairment  -RB    Functional Problems Comment Impacts QOL, participation, and communication.  -RB    Clinical Impression Comments Pt continued skilled ST today to address vocal projection and cognitive communication deficits. Targeted functional recall, use  of external aids. some goals have been met. Continuing to maintain louder quality voice.  -RB    Please refer to paper survey for additional self-reported information Yes  -RB    Please refer to items scanned into chart for additional diagnostic informaiton and handouts as provided by clinician Yes  -RB    SLP Diagnosis cog/comm deficit  -RB    Prognosis Good (comment)  -RB    Patient/caregiver participated in establishment of treatment plan and goals Yes  -RB    Patient would benefit from skilled therapy intervention Yes  -RB       SLP Plan    Frequency 1x/week  -RB    Duration 2 weeks  -RB    Planned CPT's? SLP INDIVIDUAL SPEECH THERAPY: 11625  -RB    Expected Duration of Therapy Session (SLP Eval) 45  -RB    Plan Comments Continue POC  -RB              User Key  (r) = Recorded By, (t) = Taken By, (c) = Cosigned By      Initials Name Provider Type    Sanjuanita Stokes, SLP Speech and Language Pathologist                           ACTIVITY  ACCURACY  ASSISTANCE NEEDED   LTGs:      Pt will be able to engage in speech at the conversational level with maximum articulatory accuracy so that speech is understood by familiar/unfamiliar listeners at 95% min cues           Pt will self report improvement with speech intelligibility and voice projection at work, home, community with use of PROM                     Pt and family will implement compensatory strategies to maximize patient’s memory function so patient can continue to participate in daily activities 80% of the time min cues              MET                        MET              Targeted compensatory strategies, functional recall tasks , external aid use                                                                         75%                                                                      Min cues   STG:       Pt will participate in a cognitive communication evaluation utilizing RBANS next session.                 MET       STG:      Pt will apply  compensatory strategies to improve intelligibility of speech during spontaneous speech at 95% min cues            Pt’s memory skills will be enhanced as reported by patient by utilizing internal memory strategies to recall up to 5 pieces of information after a 5 minute delay with min cues            MET                        targeted/modeled association, grouping, visualization, rehearsal, elaboration                              Names: 2/2 5 min delay x2                               No-Min cues    STG:     Pt will increase vocal loudness by 6dB with min-no cues from SLP            Pt will demonstrate improved ability to recall and summarize information by listening/reading information and  answering questions/ summarzing 80% of the time min cues                  MET                     Targeted functional recall with visual stimuli                                      Podcast: 80% x2                                     Mod cues   STG:     Pt will increase vocal loudness across tasks with a single cue from SLP or communication partner at 95%       Pt will utilize word finding strategies in conversation at 95% min cues   MET                  Targeted in conversation, modeled response elaboration training , SFA                      85%                      Min cues   Certification: 2/4/25-5/3/25                            SLP OP Goals       Row Name 03/04/25 1100          Subjective Comments    Subjective Comments PT reports her friend passed away and this has been very difficult  -RB        Subjective Pain    Able to rate subjective pain? yes  -RB     Pre-Treatment Pain Level 0  -RB     Post-Treatment Pain Level 0  -RB               User Key  (r) = Recorded By, (t) = Taken By, (c) = Cosigned By      Initials Name Provider Type    Sanjuanita Stokes SLP Speech and Language Pathologist                   OP SLP Education       Row Name 03/04/25 1100       Education    Barriers to Learning No barriers identified  -RB     Education Provided Patient participated in establishing goals and treatment plan;Patient demonstrated recommended strategies;Patient requires further education on strategies, risks  -RB    Assessed Learning needs;Learning motivation;Learning readiness;Learning preferences  -RB    Learning Motivation Strong  -RB    Learning Method Explanation;Demonstration;Teach back;Written materials  -RB    Teaching Response Verbalized understanding;Demonstrated understanding;Reinforcement needed  -RB    Education Comments HEP: routines  -RB              User Key  (r) = Recorded By, (t) = Taken By, (c) = Cosigned By      Initials Name Effective Dates    Sanjuanita Stokes SLP 10/22/24 -                          Time Calculation:        Therapy Charges for Today       Code Description Service Date Service Provider Modifiers Qty    [id number]  ST TREATMENT SPEECH 3 3/4/2025 Sanjuanita Matson SLP GN 1             Sanjuanita Matson MA CCC-SLP [facility]IS  [place] license: [id number]   3/4/2025

## 2025-03-05 ENCOUNTER — TELEPHONE (OUTPATIENT)
Dept: FAMILY MEDICINE CLINIC | Facility: CLINIC | Age: 65
End: 2025-03-05

## 2025-03-05 DIAGNOSIS — G20.B2 PARKINSON'S DISEASE WITH DYSKINESIA AND FLUCTUATING MANIFESTATIONS: Primary | ICD-10-CM

## 2025-03-05 NOTE — TELEPHONE ENCOUNTER
Caller: ZACKERY WILDER    Relationship:  DAUGHTER     Best call back number:      [phone]     Do you know the name of the person who called: CLINICAL     What was the call regarding: ZACKERY DROPPED OFF PAPERWORK ON MONDAY 3-3-25 FOR HER MOM TO MOVE INTO THE [facility]   SHE NEEDS THE PAPER WORK BY 3-6-25 AND WANTS TO KNOW IF   DR AVILES HAS FILLED THIS OUT     PLEASE CALL

## 2025-03-07 ENCOUNTER — TELEPHONE (OUTPATIENT)
Dept: FAMILY MEDICINE CLINIC | Facility: CLINIC | Age: 65
End: 2025-03-07

## 2025-03-07 NOTE — TELEPHONE ENCOUNTER
Caller: LYNDON WITH [facility]    Relationship: Provider    Best call back number: [phone]    What form or medical record are you requesting: MEDICATION LIST    Who is requesting this form or medical record from you: LYNDON WITH [facility]    How would you like to receive the form or medical records (pick-up, mail, fax): FAX  If fax, what is the fax number: [phone]  If mail, what is the address:   If pick-up, provide patient with address and location details    Timeframe paperwork needed: ASAP    Additional notes: CALLER STATES THAT PATIENT IS BEING ADMITTED TOMORROW, 3.8.25, AND THEY MUST HAVE A MEDICATION LIST TODAY SO THAT MEDICATION CAN BE ORDERED IN ADVANCE.

## 2025-03-11 ENCOUNTER — APPOINTMENT (OUTPATIENT)
Dept: SPEECH THERAPY | Facility: HOSPITAL | Age: 65
End: 2025-03-11
Payer: MEDICARE

## 2025-03-27 ENCOUNTER — TELEPHONE (OUTPATIENT)
Dept: FAMILY MEDICINE CLINIC | Facility: CLINIC | Age: 65
End: 2025-03-27
Payer: MEDICARE

## 2025-03-27 NOTE — TELEPHONE ENCOUNTER
Caller: ZACKERY MAR, NOT ON VERBAL    Relationship: Emergency Contact    Best call back number: [phone]    What form or medical record are you requesting: LETTER STATING THAT PATIENT IS UNABLE TO PERFORM DUTIES AS A POWER OF  FOR HER .    Who is requesting this form or medical record from you: DAUGHTER    How would you like to receive the form or medical records (pick-up, mail, fax): MAIL TO ADDRESS ON FILE

## 2025-03-27 NOTE — TELEPHONE ENCOUNTER
Spoke with Varsha, the patient Carolina has been in the hospital and now in assisted living with dementia. She is POA of her  but can no longer make these type of decisions on his behalf. Varsha has a sister Nori who is second listed on the POA and they are in need of getting her switched to the main.   They are needing a letter stating that Carolina can no longer be POA of her .

## 2025-03-28 DIAGNOSIS — K59.04 CHRONIC IDIOPATHIC CONSTIPATION: ICD-10-CM

## 2025-03-31 RX ORDER — LUBIPROSTONE 8 UG/1
8 CAPSULE ORAL 2 TIMES DAILY WITH MEALS
Qty: 14 CAPSULE | Refills: 51 | Status: SHIPPED | OUTPATIENT
Start: 2025-03-31

## 2025-03-31 RX ORDER — DULOXETIN HYDROCHLORIDE 60 MG/1
CAPSULE, DELAYED RELEASE ORAL
Qty: 7 CAPSULE | Refills: 51 | Status: SHIPPED | OUTPATIENT
Start: 2025-03-31

## 2025-03-31 NOTE — TELEPHONE ENCOUNTER
Rx Refill Note  Requested Prescriptions     Pending Prescriptions Disp Refills    lubiprostone (AMITIZA) 8 MCG capsule [Pharmacy Med Name: LUBIPROSTONE 8 MCG CAPSULE] 14 capsule 51     Sig: TAKE ONE CAPSULE BY MOUTH 2 TIMES A DAY WITH MEALS    DULoxetine (CYMBALTA) 60 MG capsule [Pharmacy Med Name: DULOXETINE DR 60 MG CAP (FB)] 7 capsule 51     Sig: TAKE ONE CAPSULE BY MOUTH EVERY DAY *TAKE WITH 30MG CAPSULE TO EQUAL 90MG TOTAL*      Last office visit with prescribing clinician: 12/5/2024   Last telemedicine visit with prescribing clinician: Visit date not found   Next office visit with prescribing clinician: Visit date not found                         Would you like a call back once the refill request has been completed: [] Yes [] No    If the office needs to give you a call back, can they leave a voicemail: [] Yes [] No    Cristiana Garcia MA  03/31/25, 10:13 EDT

## 2025-04-08 RX ORDER — CALCIUM CARBONATE/VITAMIN D3 500 MG-10
2 TABLET ORAL DAILY
Qty: 14 TABLET | Refills: 51 | Status: SHIPPED | OUTPATIENT
Start: 2025-04-08

## 2025-04-08 RX ORDER — BIOTIN 1 MG
TABLET ORAL DAILY
Qty: 7 TABLET | Refills: 51 | Status: SHIPPED | OUTPATIENT
Start: 2025-04-08

## 2025-04-08 RX ORDER — MULTIVIT-MIN/IRON FUM/FOLIC AC 7.5 MG-4
1 TABLET ORAL DAILY
Qty: 7 TABLET | Refills: 51 | Status: SHIPPED | OUTPATIENT
Start: 2025-04-08

## 2025-04-08 RX ORDER — MIDODRINE HYDROCHLORIDE 10 MG/1
TABLET ORAL
Qty: 14 TABLET | Refills: 51 | Status: SHIPPED | OUTPATIENT
Start: 2025-04-08

## 2025-04-08 RX ORDER — UBIDECARENONE 75 MG
50 CAPSULE ORAL DAILY
Qty: 4 TABLET | Refills: 51 | Status: SHIPPED | OUTPATIENT
Start: 2025-04-08

## 2025-04-08 RX ORDER — CHOLECALCIFEROL (VITAMIN D3) 50 MCG
2000 TABLET ORAL DAILY
Qty: 7 TABLET | Refills: 51 | Status: SHIPPED | OUTPATIENT
Start: 2025-04-08

## 2025-04-08 NOTE — TELEPHONE ENCOUNTER
Rx Refill Note  Requested Prescriptions     Pending Prescriptions Disp Refills    Cholecalciferol (Vitamin D) 50 MCG (2000 UT) tablet [Pharmacy Med Name: VITAMIN D 2000IU (50MCG) TAB] 7 tablet 51     Sig: TAKE ONE TABLET BY MOUTH ONCE A DAY.    vitamin B-12 (CYANOCOBALAMIN) 100 MCG tablet [Pharmacy Med Name: VITAMIN B-12 100 MCG TABLET] 4 tablet 51     Sig: TAKE 1/2 TABLET (50MCG) BY MOUTH EVERY DAY    multivitamin with minerals tablet [Pharmacy Med Name: MULTIVITAMIN-MINERALS TABLET] 7 tablet 51     Sig: TAKE ONE TABLET BY MOUTH ONCE A DAY.    midodrine (PROAMATINE) 10 MG tablet [Pharmacy Med Name: MIDODRINE HCL 10 MG TABLET] 14 tablet 51     Sig: TAKE ONE TABLET BY MOUTH 2 TIMES A DAY BEFORE BREAKFAST, AND 6 HOURS LATER    Oyster Shell Calcium + D3 500-10 MG-MCG tablet [Pharmacy Med Name: CALCIUM 500-VIT D3 400 TABL] 14 tablet 51     Sig: TAKE TWO TABLETS BY MOUTH ONCE DAILY    Biotin 1000 MCG tablet [Pharmacy Med Name: BIOTIN 1,000 MCG TABLET] 7 tablet 51     Sig: TAKE ONE (1) CAPSULE BY MOUTH DAILY.      Last office visit with prescribing clinician: 12/5/2024   Last telemedicine visit with prescribing clinician: Visit date not found   Next office visit with prescribing clinician: Visit date not found                         Would you like a call back once the refill request has been completed: [] Yes [] No    If the office needs to give you a call back, can they leave a voicemail: [] Yes [] No    Anna Freeman MA  04/08/25, 08:07 EDT

## 2025-04-09 ENCOUNTER — TELEPHONE (OUTPATIENT)
Dept: FAMILY MEDICINE CLINIC | Facility: CLINIC | Age: 65
End: 2025-04-09
Payer: MEDICARE

## 2025-04-09 DIAGNOSIS — R41.0 CONFUSION: Primary | ICD-10-CM

## 2025-04-09 NOTE — TELEPHONE ENCOUNTER
Caller: EFRA MAY    Relationship:     Best call back number: [phone]    What orders are you requesting (i.e. lab or imaging): WANTS TO GET A UA ON THIS PATIENT. HER URINE IS DARK AND SHE IS VERY TIRED. THE AIDES ARE SAYING SHE IS ACTING OFF TO THEM AS WELL    In what timeframe would the patient need to come in:      Where will you receive your lab/imaging services: [facility]

## 2025-04-09 NOTE — TELEPHONE ENCOUNTER
Caller: ALYSE    Relationship:     Best call back number: [phone]    What was the call regarding:     SUNSHINE LACKEY CALLED BACK ASKING PLEASE FAX ALL ORDERS -[phone] ATTN ALYSE    ALSO PATIENT HAS A RASH ALL OVER HER FACE AND IT IS RED. WANTS TO KNOW WHAT TO DO FOR THIS?     Is it okay if the provider responds through MyChart: NO

## 2025-04-10 NOTE — TELEPHONE ENCOUNTER
Faxed over ua order to Cyndi at [facility]. Called her to discuss rash on patients face. No answer. Unsure of voicemail.    Dr Sanchez recommends patient to be evaluated for rash.     Please relay.     Rm#10  Chief Complaint   Patient presents with    Well Child     12 month vfc      1. Have you been to the ER, urgent care clinic since your last visit? Hospitalized since your last visit? No    2. Have you seen or consulted any other health care providers outside of the [address] [place] [address] since your last visit? Include any pap smears or colon screening.  No  Health Maintenance Due   Topic Date Due    Varicella Peds Age 1-18 (1 of 2 - 2 Dose Childhood Series) 04/13/2018    Hepatitis A Peds Age 1-18 (1 of 2 - Standard Series) 04/13/2018    Hib Peds Age 0-5 (3 of 3 - Standard Series) 04/13/2018    MMR Peds Age 1-18 (1 of 2) 04/13/2018    PCV Peds Age 0-5 (4 of 4 - Standard Series) 05/15/2018     Parent of vacc due today

## 2025-04-14 ENCOUNTER — TELEPHONE (OUTPATIENT)
Dept: FAMILY MEDICINE CLINIC | Facility: CLINIC | Age: 65
End: 2025-04-14
Payer: MEDICARE

## 2025-04-14 DIAGNOSIS — R53.83 OTHER FATIGUE: Primary | ICD-10-CM

## 2025-04-14 NOTE — TELEPHONE ENCOUNTER
Caller: ZACKERY    Relationship: Child    Best call back number: [phone]     What orders are you requesting (i.e. lab or imaging): FULL PANEL LABS    In what timeframe would the patient need to come in: AS SOON AS POSSIBLE    Where [facility]     Additional notes: FAX NUMBER: ATTENTION kayla [phone]

## 2025-04-25 ENCOUNTER — EXTERNAL PBMM DATA (OUTPATIENT)
Dept: PHARMACY | Facility: OTHER | Age: 65
End: 2025-04-25
Payer: MEDICARE

## 2025-05-01 ENCOUNTER — TELEPHONE (OUTPATIENT)
Dept: FAMILY MEDICINE CLINIC | Facility: CLINIC | Age: 65
End: 2025-05-01
Payer: MEDICARE

## 2025-05-01 NOTE — TELEPHONE ENCOUNTER
[facility] assisted living calling to update PCP      Pt had fall this morning, did not lose consciousness, she has a few small hematoma to her forehead    She did not go to hospital, they are monitoring at the facility        
Spontaneous

## 2025-05-06 ENCOUNTER — EXTERNAL PBMM DATA (OUTPATIENT)
Dept: PHARMACY | Facility: OTHER | Age: 65
End: 2025-05-06
Payer: MEDICARE

## 2025-05-27 RX ORDER — CARBIDOPA AND LEVODOPA 25; 100 MG/1; MG/1
1.5 TABLET ORAL
Qty: 180 TABLET | Refills: 0 | OUTPATIENT
Start: 2025-05-27 | End: 2025-06-26

## 2025-05-27 RX ORDER — CARBIDOPA AND LEVODOPA 25; 100 MG/1; MG/1
TABLET ORAL
Qty: 129 TABLET | Refills: 11 | Status: SHIPPED | OUTPATIENT
Start: 2025-05-27

## 2025-05-27 NOTE — TELEPHONE ENCOUNTER
Caller: SUNSHINE LACKEY    Relationship: Other    Best call back number: [phone]    Requested Prescriptions:   Requested Prescriptions     Pending Prescriptions Disp Refills    carbidopa-levodopa (SINEMET)  MG per tablet 180 tablet 0     Sig: Take 1.5 tablets by mouth 4 (Four) Times a Day Before Meals & at Bedtime for 30 days.        Pharmacy where request should be sent: [facility] - [address] - [phone] [facility] [phone] FX     Last office visit with prescribing clinician: 12/5/2024   Last telemedicine visit with prescribing clinician: Visit date not found   Next office visit with prescribing clinician: Visit date not found     Additional details provided by patient: THE PATIENT HAS LESS THAN THREE DAYS LEFT     Does the patient have less than a 3 day supply:  [x] Yes  [] No    Would you like a call back once the refill request has been completed: [] Yes [x] No    If the office needs to give you a call back, can they leave a voicemail: [] Yes [x] No    Alie Healy Rep   05/27/25 12:59 EDT

## 2025-05-27 NOTE — TELEPHONE ENCOUNTER
Rx Refill Note  Requested Prescriptions     Pending Prescriptions Disp Refills    carbidopa-levodopa (SINEMET)  MG per tablet [Pharmacy Med Name: CARBIDOPA-LEVODOPA  TAB] 129 tablet 11     Sig: TAKE ONE-HALF TABLET BY MOUTH FOUR TIMES A DAY TAKE 1 TABLET BY MOUTH 4 TIMES A DAY      Last office visit with prescribing clinician: 12/5/2024   Last telemedicine visit with prescribing clinician: Visit date not found   Next office visit with prescribing clinician: 5/27/2025                         Would you like a call back once the refill request has been completed: [] Yes [] No    If the office needs to give you a call back, can they leave a voicemail: [] Yes [] No    Cristiana Garcia MA  05/27/25, 13:53 EDT

## 2025-05-27 NOTE — TELEPHONE ENCOUNTER
Rx Refill Note  Requested Prescriptions     Pending Prescriptions Disp Refills    carbidopa-levodopa (SINEMET)  MG per tablet 180 tablet 0     Sig: Take 1.5 tablets by mouth 4 (Four) Times a Day Before Meals & at Bedtime for 30 days.      Last office visit with prescribing clinician: 12/5/2024   Last telemedicine visit with prescribing clinician: Visit date not found   Next office visit with prescribing clinician: Visit date not found       Sanjuanita Meade MA  05/27/25, 13:22 EDT

## 2025-06-27 ENCOUNTER — HOSPITAL ENCOUNTER (EMERGENCY)
Facility: HOSPITAL | Age: 65
Discharge: HOME | End: 2025-06-27
Attending: EMERGENCY MEDICINE
Payer: MEDICARE

## 2025-06-27 ENCOUNTER — APPOINTMENT (OUTPATIENT)
Dept: GENERAL RADIOLOGY | Facility: HOSPITAL | Age: 65
End: 2025-06-27
Payer: MEDICARE

## 2025-06-27 ENCOUNTER — APPOINTMENT (OUTPATIENT)
Dept: CT IMAGING | Facility: HOSPITAL | Age: 65
End: 2025-06-27
Payer: MEDICARE

## 2025-06-27 VITALS
OXYGEN SATURATION: 100 % | DIASTOLIC BLOOD PRESSURE: 70 MMHG | WEIGHT: 132.28 LBS | TEMPERATURE: 98.1 F | SYSTOLIC BLOOD PRESSURE: 133 MMHG | BODY MASS INDEX: 22.58 KG/M2 | HEIGHT: 64 IN | RESPIRATION RATE: 16 BRPM | HEART RATE: 77 BPM

## 2025-06-27 DIAGNOSIS — G20.A1 PARKINSON'S DISEASE, UNSPECIFIED WHETHER DYSKINESIA PRESENT, UNSPECIFIED WHETHER MANIFESTATIONS FLUCTUATE: ICD-10-CM

## 2025-06-27 DIAGNOSIS — S09.90XA INJURY OF HEAD, INITIAL ENCOUNTER: Primary | ICD-10-CM

## 2025-06-27 LAB
ALBUMIN SERPL-MCNC: 4.2 G/DL (ref 3.5–5.2)
ALBUMIN/GLOB SERPL: 1.9 G/DL
ALP SERPL-CCNC: 72 U/L (ref 39–117)
ALT SERPL W P-5'-P-CCNC: <5 U/L (ref 1–33)
ANION GAP SERPL CALCULATED.3IONS-SCNC: 10.6 MMOL/L (ref 5–15)
AST SERPL-CCNC: 36 U/L (ref 1–32)
BASOPHILS # BLD AUTO: 0.03 10*3/MM3 (ref 0–0.2)
BASOPHILS NFR BLD AUTO: 0.4 % (ref 0–1.5)
BILIRUB SERPL-MCNC: 0.3 MG/DL (ref 0–1.2)
BUN SERPL-MCNC: 16.5 MG/DL (ref 8–23)
BUN/CREAT SERPL: 23.2 (ref 7–25)
CALCIUM SPEC-SCNC: 8.7 MG/DL (ref 8.6–10.5)
CHLORIDE SERPL-SCNC: 103 MMOL/L (ref 98–107)
CO2 SERPL-SCNC: 27.4 MMOL/L (ref 22–29)
CREAT SERPL-MCNC: 0.71 MG/DL (ref 0.57–1)
DEPRECATED RDW RBC AUTO: 47 FL (ref 37–54)
EGFRCR SERPLBLD CKD-EPI 2021: 93.9 ML/MIN/1.73
EOSINOPHIL # BLD AUTO: 0.08 10*3/MM3 (ref 0–0.4)
EOSINOPHIL NFR BLD AUTO: 1.2 % (ref 0.3–6.2)
ERYTHROCYTE [DISTWIDTH] IN BLOOD BY AUTOMATED COUNT: 13.4 % (ref 12.3–15.4)
GEN 5 1HR TROPONIN T REFLEX: 20 NG/L
GLOBULIN UR ELPH-MCNC: 2.2 GM/DL
GLUCOSE SERPL-MCNC: 110 MG/DL (ref 65–99)
HCT VFR BLD AUTO: 32.5 % (ref 34–46.6)
HGB BLD-MCNC: 10.6 G/DL (ref 12–15.9)
IMM GRANULOCYTES # BLD AUTO: 0.01 10*3/MM3 (ref 0–0.05)
IMM GRANULOCYTES NFR BLD AUTO: 0.1 % (ref 0–0.5)
LYMPHOCYTES # BLD AUTO: 1.78 10*3/MM3 (ref 0.7–3.1)
LYMPHOCYTES NFR BLD AUTO: 25.8 % (ref 19.6–45.3)
MCH RBC QN AUTO: 31 PG (ref 26.6–33)
MCHC RBC AUTO-ENTMCNC: 32.6 G/DL (ref 31.5–35.7)
MCV RBC AUTO: 95 FL (ref 79–97)
MONOCYTES # BLD AUTO: 0.57 10*3/MM3 (ref 0.1–0.9)
MONOCYTES NFR BLD AUTO: 8.2 % (ref 5–12)
NEUTROPHILS NFR BLD AUTO: 4.44 10*3/MM3 (ref 1.7–7)
NEUTROPHILS NFR BLD AUTO: 64.3 % (ref 42.7–76)
NRBC BLD AUTO-RTO: 0 /100 WBC (ref 0–0.2)
PLATELET # BLD AUTO: 242 10*3/MM3 (ref 140–450)
PMV BLD AUTO: 9 FL (ref 6–12)
POTASSIUM SERPL-SCNC: 3.9 MMOL/L (ref 3.5–5.2)
PROT SERPL-MCNC: 6.4 G/DL (ref 6–8.5)
RBC # BLD AUTO: 3.42 10*6/MM3 (ref 3.77–5.28)
SODIUM SERPL-SCNC: 141 MMOL/L (ref 136–145)
TROPONIN T % DELTA: 0
TROPONIN T NUMERIC DELTA: 0 NG/L
TROPONIN T SERPL HS-MCNC: 20 NG/L
WBC NRBC COR # BLD AUTO: 6.91 10*3/MM3 (ref 3.4–10.8)

## 2025-06-27 PROCEDURE — 93005 ELECTROCARDIOGRAM TRACING: CPT | Performed by: EMERGENCY MEDICINE

## 2025-06-27 PROCEDURE — 72125 CT NECK SPINE W/O DYE: CPT

## 2025-06-27 PROCEDURE — 36415 COLL VENOUS BLD VENIPUNCTURE: CPT

## 2025-06-27 PROCEDURE — 99284 EMERGENCY DEPT VISIT MOD MDM: CPT

## 2025-06-27 PROCEDURE — 71045 X-RAY EXAM CHEST 1 VIEW: CPT

## 2025-06-27 PROCEDURE — 84484 ASSAY OF TROPONIN QUANT: CPT | Performed by: EMERGENCY MEDICINE

## 2025-06-27 PROCEDURE — 85025 COMPLETE CBC W/AUTO DIFF WBC: CPT | Performed by: EMERGENCY MEDICINE

## 2025-06-27 PROCEDURE — 80053 COMPREHEN METABOLIC PANEL: CPT | Performed by: EMERGENCY MEDICINE

## 2025-06-27 PROCEDURE — 70450 CT HEAD/BRAIN W/O DYE: CPT

## 2025-06-27 NOTE — ED PROVIDER NOTES
"Subjective   History of Present Illness    Review of Systems    Past Medical History:   Diagnosis Date    Allergic 2003    One shot every 2 weeks    Anxiety 10/1/2015    Arthritis Dec 2022    75 mg Twice/day    Cancer     Depression 10/1/15    90 mg. dulexatine daily    Hyperthyroidism Pre 2013    Hypothyroidism 22    Parkinson's disease     Urinary tract infection 24       Allergies   Allergen Reactions    Codeine Palpitations    Epinephrine Dizziness and Other (See Comments)       Past Surgical History:   Procedure Laterality Date    BREAST SURGERY  10/5/2014    lumpectomy    COLONOSCOPY  ??? 2018    Luis Eli MD [facility]       Family History   Problem Relation Age of Onset    Cancer Mother         bladder 2022    Alcohol abuse Father          92    Early death Father         60 yr old Heart attack    Hyperlipidemia Father         3 corotoid surgeries    Cancer Maternal Aunt         breast    Stroke Sister         double stroke 2022       Social History     Socioeconomic History    Marital status:    Tobacco Use    Smoking status: Never     Passive exposure: Never    Smokeless tobacco: Never   Vaping Use    Vaping status: Never Used   Substance and Sexual Activity    Alcohol use: Not Currently     Alcohol/week: 14.0 standard drinks of alcohol     Comment: denies x 3 weeks    Drug use: Never    Sexual activity: Not Currently     Partners: Male     Birth control/protection: Post-menopausal           Objective   Physical Exam    Procedures           ED Course    I reviewed outside records.  PCP note 24 notes a history of \"balance issues\", HTN, dizziness, irregular heartbeat.  Dx dysautonomia and rx midodrine.  PCP note 24 notes a history of Parkinson's disease and dementia.                                                     Medical Decision Making      Final diagnoses:   None       ED Disposition  ED Disposition       None            No follow-up provider " specified.       Medication List      No changes were made to your prescriptions during this visit.

## 2025-06-27 NOTE — ED PROVIDER NOTES
[place]    EMERGENCY DEPARTMENT ENCOUNTER      Pt Name: Carolina Duarte  MRN: [id number]  YOB: 1960  Date of evaluation: 6/27/2025  Provider: Max Manning MD    CHIEF COMPLAINT       Chief Complaint   Patient presents with    Fall         HISTORY OF PRESENT ILLNESS   Carolina Duarte is a 65 y.o. female who presents to the emergency department after a fall that reportedly occurred prior to arrival.  According to EMS, staff was not sure if the patient passed out.  She did hit her head.  C-collar was placed by EMS.  Patient has dementia and cannot contribute reliably to history at this point.  She does not complain about any area of pain.      Nursing notes were reviewed.    REVIEW OF SYSTEMS     ROS:  A chief complaint appropriate review of systems was completed and is negative except as noted in the HPI.      PAST MEDICAL HISTORY     Past Medical History:   Diagnosis Date    Allergic 2003    One shot every 2 weeks    Anxiety 10/1/2015    Arthritis Dec 2022    75 mg Twice/day    Cancer     Depression 10/1/15    90 mg. dulexatine daily    Hyperthyroidism Pre 9/1/2013    Hypothyroidism 9/1/22    Parkinson's disease     Urinary tract infection 1/8/24         SURGICAL HISTORY       Past Surgical History:   Procedure Laterality Date    BREAST SURGERY  10/5/2014    lumpectomy    COLONOSCOPY  ??? 2018    Luis Eli MD [facility]         CURRENT MEDICATIONS     No current facility-administered medications for this encounter.    Current Outpatient Medications:     aspirin 81 MG EC tablet, Take 1 tablet by mouth Daily., Disp: 90 tablet, Rfl: 1    atorvastatin (LIPITOR) 80 MG tablet, Take 1 tablet by mouth Every Night., Disp: 30 tablet, Rfl: 0    Biotin 1 MG capsule, Take 1 capsule by mouth Daily. OTC, Disp: , Rfl:     Biotin 1000 MCG tablet, TAKE ONE (1) CAPSULE BY MOUTH DAILY., Disp: 7 tablet, Rfl: 51    Calcium Carb-Cholecalciferol 1000-20 MG-MCG tablet, Take 1 tablet by mouth Daily. OTC, Disp:  , Rfl:     carbidopa-levodopa (SINEMET)  MG per tablet, TAKE ONE-HALF TABLET BY MOUTH FOUR TIMES A DAY TAKE 1 TABLET BY MOUTH 4 TIMES A DAY, Disp: 129 tablet, Rfl: 11    Cholecalciferol (Vitamin D) 50 MCG (2000 UT) tablet, TAKE ONE TABLET BY MOUTH ONCE A DAY., Disp: 7 tablet, Rfl: 51    Cholecalciferol 50 MCG (2000 UT) capsule, Take 1 capsule by mouth Daily. OTC, Disp: , Rfl:     cyanocobalamin (VITAMIN B-12N) 50 MCG tablet, Take 1 tablet by mouth Daily. OTC, Disp: , Rfl:     DICLOFENAC PO, Take 75 mg by mouth 2 (Two) Times a Day., Disp: , Rfl:     doxycycline (VIBRAMYCIN) 100 MG capsule, Take 1 capsule by mouth 2 (Two) Times a Day., Disp: 10 capsule, Rfl: 0    DULoxetine (CYMBALTA) 60 MG capsule, TAKE ONE CAPSULE BY MOUTH EVERY DAY *TAKE WITH 30MG CAPSULE TO EQUAL 90MG TOTAL*, Disp: 7 capsule, Rfl: 51    levothyroxine (SYNTHROID, LEVOTHROID) 75 MCG tablet, Take 1 tablet by mouth Every Morning., Disp: 90 tablet, Rfl: 2    lubiprostone (AMITIZA) 8 MCG capsule, TAKE ONE CAPSULE BY MOUTH 2 TIMES A DAY WITH MEALS, Disp: 14 capsule, Rfl: 51    midodrine (PROAMATINE) 10 MG tablet, TAKE ONE TABLET BY MOUTH 2 TIMES A DAY BEFORE BREAKFAST, AND 6 HOURS LATER, Disp: 14 tablet, Rfl: 51    midodrine (PROAMATINE) 5 MG tablet, Take 2 tablets by mouth 2 (Two) Times a Day Before Meals. Qam breakfast and  then 6 hrs later, Disp: 180 tablet, Rfl: 0    multivitamin with minerals tablet, TAKE ONE TABLET BY MOUTH ONCE A DAY., Disp: 7 tablet, Rfl: 51    omeprazole (priLOSEC) 20 MG capsule, Take 1 capsule by mouth Daily., Disp: 30 capsule, Rfl: 1    Oyster Shell Calcium + D3 500-10 MG-MCG tablet, TAKE TWO TABLETS BY MOUTH ONCE DAILY, Disp: 14 tablet, Rfl: 51    Pimavanserin Tartrate (Nuplazid) 34 MG capsule, Take  by mouth., Disp: , Rfl:     vitamin B-12 (CYANOCOBALAMIN) 100 MCG tablet, TAKE 1/2 TABLET (50MCG) BY MOUTH EVERY DAY, Disp: 4 tablet, Rfl: 51    ALLERGIES     Codeine and Epinephrine    FAMILY HISTORY       Family History    Problem Relation Age of Onset    Cancer Mother         bladder 2022    Alcohol abuse Father          92    Early death Father         60 yr old Heart attack    Hyperlipidemia Father         3 corotoid surgeries    Cancer Maternal Aunt         breast    Stroke Sister         double stroke 2022          SOCIAL HISTORY       Social History     Socioeconomic History    Marital status:    Tobacco Use    Smoking status: Never     Passive exposure: Never    Smokeless tobacco: Never   Vaping Use    Vaping status: Never Used   Substance and Sexual Activity    Alcohol use: Not Currently     Alcohol/week: 14.0 standard drinks of alcohol     Comment: denies x 3 weeks    Drug use: Never    Sexual activity: Not Currently     Partners: Male     Birth control/protection: Post-menopausal         PHYSICAL EXAM    (up to 7 for level 4, 8 or more for level 5)     Vitals:    25 1700 25 1729 25 1800 25 1830   BP: 114/51 111/59 126/71 133/70   BP Location:       Patient Position:       Pulse: 84 79 79 77   Resp:       Temp:       TempSrc:       SpO2: 99% 99% 99% 100%   Weight:       Height:           General: Awake, alert, no acute distress.  HEENT: Conjunctivae normal.  Neck: Trachea midline.  She does not seem to have any tenderness with palpation of the midline of the neck.  Cardiac: Heart regular rate, rhythm, no murmurs, rubs, or gallops  Lungs: Lungs are clear to auscultation, there is no wheezing, rhonchi, or rales. There is no use of accessory muscles.  Chest wall: There is no tenderness to palpation over the chest wall or over ribs  Abdomen: Abdomen is soft, nontender, nondistended. There are no firm or pulsatile masses, no rebound rigidity or guarding.   Musculoskeletal: I palpated the patient's chest wall, back, and all 4 extremities without any expressed discomfort.  I ranged all the patient's extremity joints without any pain.  Neuro: Alert and oriented to person.  She moves  all extremities equally.  Dermatology: Skin is warm and dry      DIAGNOSTIC RESULTS     EKG: All EKGs are interpreted by the Emergency Department Physician who either signs or Co-signs this chart in the absence of a cardiologist.    ECG 12 Lead Syncope   Preliminary Result   Test Reason : Syncope   Blood Pressure :   */*   mmHG   Vent. Rate :  88 BPM     Atrial Rate :  88 BPM      P-R Int : 160 ms          QRS Dur :  78 ms       QT Int : 378 ms       P-R-T Axes : 102  36  42 degrees     QTcB Int : 457 ms      Sinus rhythm with occasional premature ventricular complexes   Otherwise normal ECG   When compared with ECG of 10-Dec-2024 15:19,   premature ventricular complexes are now present      Referred By: jonathan           Confirmed By:       Telemetry Scan   Final Result            RADIOLOGY:   [x] Radiologist's Report Reviewed:  CT Head Without Contrast   Final Result   Impression:   No acute intracranial process or calvarial fracture identified.            Electronically Signed: Lisandro Mclaughlin MD     6/27/2025 7:00 PM EDT     Workstation ID: [id number]      CT Cervical Spine Without Contrast   Final Result   Impression:   No acute fracture or traumatic malalignment of the cervical spine.         Electronically Signed: Chuckie Samson MD     6/27/2025 7:13 PM EDT     Workstation ID: [id number]      XR Chest 1 View   Final Result   Impression:   No acute cardiopulmonary disease         Electronically Signed: Kaiser Dowell MD     6/27/2025 5:45 PM EDT     Workstation ID: [id number]          I ordered and independently reviewed the above noted radiographic studies.        LABS:    I have reviewed and interpreted all of the currently available lab results from this visit (if applicable):  Results for orders placed or performed during the hospital encounter of 06/27/25   ECG 12 Lead Syncope    Collection Time: 06/27/25  5:07 PM   Result Value Ref Range    QT Interval 378 ms    QTC Interval 457 ms   Comprehensive Metabolic  Panel    Collection Time: 06/27/25  5:12 PM    Specimen: Blood   Result Value Ref Range    Glucose 110 (H) 65 - 99 mg/dL    BUN 16.5 8.0 - 23.0 mg/dL    Creatinine 0.71 0.57 - 1.00 mg/dL    Sodium 141 136 - 145 mmol/L    Potassium 3.9 3.5 - 5.2 mmol/L    Chloride 103 98 - 107 mmol/L    CO2 27.4 22.0 - 29.0 mmol/L    Calcium 8.7 8.6 - 10.5 mg/dL    Total Protein 6.4 6.0 - 8.5 g/dL    Albumin 4.2 3.5 - 5.2 g/dL    ALT (SGPT) <5 1 - 33 U/L    AST (SGOT) 36 (H) 1 - 32 U/L    Alkaline Phosphatase 72 39 - 117 U/L    Total Bilirubin 0.3 0.0 - 1.2 mg/dL    Globulin 2.2 gm/dL    A/G Ratio 1.9 g/dL    BUN/Creatinine Ratio 23.2 7.0 - 25.0    Anion Gap 10.6 5.0 - 15.0 mmol/L    eGFR 93.9 >60.0 mL/min/1.73   High Sensitivity Troponin T    Collection Time: 06/27/25  5:12 PM    Specimen: Blood   Result Value Ref Range    HS Troponin T 20 (H) <14 ng/L   CBC Auto Differential    Collection Time: 06/27/25  5:12 PM    Specimen: Blood   Result Value Ref Range    WBC 6.91 3.40 - 10.80 10*3/mm3    RBC 3.42 (L) 3.77 - 5.28 10*6/mm3    Hemoglobin 10.6 (L) 12.0 - 15.9 g/dL    Hematocrit 32.5 (L) 34.0 - 46.6 %    MCV 95.0 79.0 - 97.0 fL    MCH 31.0 26.6 - 33.0 pg    MCHC 32.6 31.5 - 35.7 g/dL    RDW 13.4 12.3 - 15.4 %    RDW-SD 47.0 37.0 - 54.0 fl    MPV 9.0 6.0 - 12.0 fL    Platelets 242 140 - 450 10*3/mm3    Neutrophil % 64.3 42.7 - 76.0 %    Lymphocyte % 25.8 19.6 - 45.3 %    Monocyte % 8.2 5.0 - 12.0 %    Eosinophil % 1.2 0.3 - 6.2 %    Basophil % 0.4 0.0 - 1.5 %    Immature Grans % 0.1 0.0 - 0.5 %    Neutrophils, Absolute 4.44 1.70 - 7.00 10*3/mm3    Lymphocytes, Absolute 1.78 0.70 - 3.10 10*3/mm3    Monocytes, Absolute 0.57 0.10 - 0.90 10*3/mm3    Eosinophils, Absolute 0.08 0.00 - 0.40 10*3/mm3    Basophils, Absolute 0.03 0.00 - 0.20 10*3/mm3    Immature Grans, Absolute 0.01 0.00 - 0.05 10*3/mm3    nRBC 0.0 0.0 - 0.2 /100 WBC   High Sensitivity Troponin T 1Hr    Collection Time: 06/27/25  6:35 PM    Specimen: Blood   Result Value Ref  Range    HS Troponin T 20 (H) <14 ng/L    Troponin T Numeric Delta 0 ng/L    Troponin T % Delta 0 Abnormal if >/= 20%        If labs were ordered, I independently reviewed the results and considered them in treating the patient.      EMERGENCY DEPARTMENT COURSE and DIFFERENTIAL DIAGNOSIS/MDM:   Vitals:  AS OF 00:34 EDT    BP - 133/70  HR - 77  TEMP - 98.1 °F (36.7 °C) (Oral)  O2 SATS - 100%        Discussion below represents my analysis of pertinent findings related to patient's condition, differential diagnosis, treatment plan and final disposition.      Differential diagnosis:  The differential diagnosis associated with the patient's presentation includes: Skull fracture, ICH, C-spine fracture      Independent interpretations (ECG/rhythm strip/X-ray/US/CT scan): I independently interpreted the patient's head CT and cervical spine CT.  No evidence of ICH or C-spine fracture.      Additional sources:  Discussed/obtained information from independent historians:   [] Spouse:   [] Parent:   [] Friend:   [x] EMS: Report was taken from EMS, vital signs stable during transport   [] Other:  External (non-ED) record review:   [] Inpatient record:   [] Office record:   [] Outpatient record:   [] Prior Outpatient labs:   [] Prior Outpatient radiology:   [] Primary Care record:   [] Outside ED record:   [] Other:       Patient's care impacted by:   [] Diabetes   [] Hypertension   [] Coronary Artery Disease   [] Cancer   [x] Other: Parkinson's/dementia    Care significantly affected by Social Determinants of Health (housing and economic circumstances, unemployment)    [] Yes     [x] No   If yes, Patient's care significantly limited by  Social Determinants of Health including:    [] Inadequate housing    [] Low income    [] Alcoholism and drug addiction in family    [] Problems related to primary support group    [] Unemployment    [] Problems related to employment    [] Other Social Determinants of Health:       ED  Course:    ED Course as of 06/28/25 0034   Fri Jun 27, 2025   [id number] On reexamination, patient remains well-appearing and nontoxic.  She is resting comfortably in bed and continues to have no specific complaints although history is difficult due to active dementia.  CT of the head and cervical spine are within normal limits and without any evidence of skull fracture, ICH, or C-spine fracture.  Questionable syncope per EMS report.  ECG is within normal limits, troponin are flat.  No other significant laboratory abnormality.  I feel that she is stable for discharge back to her facility. [NS]      ED Course User Index  [NS] Max Mnaning MD           I had a discussion with the patient/family regarding diagnosis, diagnostic results, treatment plan, and medications.  The patient/family indicated understanding of these instructions.  I spent adequate time at the bedside preceding discharge necessary to personally discuss the aftercare instructions, giving patient education, providing explanations of the results of our evaluations/findings, and my decision making to assure that the patient/family understand the plan of care.  Time was allotted to answer questions at that time and throughout the ED course.  Emphasis was placed on timely follow-up after discharge.  I also discussed the potential for the development of an acute emergent condition requiring further evaluation, admission, or even surgical intervention. I discussed that we found nothing during the visit today indicating the need for further workup, admission, or the presence of an unstable medical condition.  I encouraged the patient to return to the emergency department immediately for ANY concerns, worsening, new complaints, or if symptoms persist and unable to seek follow-up in a timely fashion.  The patient/family expressed understanding and agreement with this plan.  The patient will follow-up with their PCP in 1-2 days for reevaluation.           FINAL  IMPRESSION      1. Injury of head, initial encounter    2. Parkinson's disease, unspecified whether dyskinesia present, unspecified whether manifestations fluctuate          DISPOSITION/PLAN     ED Disposition       ED Disposition   Discharge    Condition   Stable    Comment   --                 Comment: Please note this report has been produced using speech recognition software.      Max Manning MD  Attending Emergency Physician             Max Manning MD  06/28/25 0036

## 2025-06-28 LAB
QT INTERVAL: 378 MS
QTC INTERVAL: 457 MS

## 2025-07-11 ENCOUNTER — APPOINTMENT (OUTPATIENT)
Dept: CT IMAGING | Facility: HOSPITAL | Age: 65
End: 2025-07-11
Payer: MEDICARE

## 2025-07-11 ENCOUNTER — HOSPITAL ENCOUNTER (EMERGENCY)
Facility: HOSPITAL | Age: 65
Discharge: HOME | End: 2025-07-11
Attending: EMERGENCY MEDICINE
Payer: MEDICARE

## 2025-07-11 VITALS
OXYGEN SATURATION: 99 % | DIASTOLIC BLOOD PRESSURE: 83 MMHG | SYSTOLIC BLOOD PRESSURE: 151 MMHG | TEMPERATURE: 97.5 F | HEIGHT: 64 IN | HEART RATE: 86 BPM | RESPIRATION RATE: 16 BRPM | BODY MASS INDEX: 22.58 KG/M2 | WEIGHT: 132.28 LBS

## 2025-07-11 DIAGNOSIS — S09.90XA INJURY OF HEAD, INITIAL ENCOUNTER: Primary | ICD-10-CM

## 2025-07-11 DIAGNOSIS — F03.90 DEMENTIA WITHOUT BEHAVIORAL DISTURBANCE: ICD-10-CM

## 2025-07-11 PROCEDURE — 72192 CT PELVIS W/O DYE: CPT

## 2025-07-11 PROCEDURE — 70450 CT HEAD/BRAIN W/O DYE: CPT

## 2025-07-11 PROCEDURE — 72125 CT NECK SPINE W/O DYE: CPT

## 2025-07-11 PROCEDURE — 99284 EMERGENCY DEPT VISIT MOD MDM: CPT

## 2025-07-11 NOTE — ED PROVIDER NOTES
Subjective   History of Present Illness    Pt presents after a fall.  She arrived from memory care facility with report of fall. No additional history is provided and patient is nonverbal.  Packet from the nursing home includes a med list and no anticoagulants are included.  Patient has a small laceration at the edge of the left eyebrow.    History provided by:  Medical records  History limited by:  Dementia      Review of Systems    Past Medical History:   Diagnosis Date    Allergic     One shot every 2 weeks    Anxiety 10/1/2015    Arthritis Dec 2022    75 mg Twice/day    Cancer     Depression 10/1/15    90 mg. dulexatine daily    Hyperthyroidism Pre 2013    Hypothyroidism 22    Parkinson's disease     Urinary tract infection 24       Allergies   Allergen Reactions    Codeine Palpitations    Epinephrine Dizziness and Other (See Comments)       Past Surgical History:   Procedure Laterality Date    BREAST SURGERY  10/5/2014    lumpectomy    COLONOSCOPY  ???     Luis Eli MD [facility]       Family History   Problem Relation Age of Onset    Cancer Mother         bladder 2022    Alcohol abuse Father          92    Early death Father         60 yr old Heart attack    Hyperlipidemia Father         3 corotoid surgeries    Cancer Maternal Aunt         breast    Stroke Sister         double stroke 2022       Social History     Socioeconomic History    Marital status:    Tobacco Use    Smoking status: Never     Passive exposure: Never    Smokeless tobacco: Never   Vaping Use    Vaping status: Never Used   Substance and Sexual Activity    Alcohol use: Not Currently     Alcohol/week: 14.0 standard drinks of alcohol     Comment: denies x 3 weeks    Drug use: Never    Sexual activity: Not Currently     Partners: Male     Birth control/protection: Post-menopausal           Objective   Physical Exam  Vitals and nursing note reviewed.   Constitutional:       General: She is  not in acute distress.     Appearance: Normal appearance. She is not ill-appearing.   HENT:      Head: Normocephalic.   Eyes:      General: No scleral icterus.        Right eye: No discharge.         Left eye: No discharge.      Conjunctiva/sclera: Conjunctivae normal.   Cardiovascular:      Rate and Rhythm: Normal rate.   Pulmonary:      Effort: Pulmonary effort is normal. No respiratory distress.   Musculoskeletal:         General: No swelling or deformity.      Cervical back: Normal range of motion and neck supple.      Comments: Pelvis stable. No bruising, swelling or deformity noted to arms or legs.  ROM of the arms and legs appears to be painless.    Neck does not seem to be tender.    There is a 1 cm well approximated laceration at the lateral edge of the L eyebrow.  Pupil reactive.  No other scalp injuries seen or palpated.   Skin:     General: Skin is dry.      Findings: No rash.   Neurological:      General: No focal deficit present.      Mental Status: She is alert.      Comments: Moves all fours.  Sleeping, arouses to voice and touch.  Indicates she is feeling fine, does not speak.   Psychiatric:         Mood and Affect: Mood normal.         Behavior: Behavior normal.         Thought Content: Thought content normal.         Procedures           ED Course    CT scans of the head, C-spine and pelvis are read by me and are negative for ICH or fracture.  Reports reviewed.    Patient stable on rechecks, indicates she is comfortable.  Safe to go back to memory care.                                                   Medical Decision Making  Problems Addressed:  Dementia without behavioral disturbance: chronic illness or injury  Injury of head, initial encounter: complicated acute illness or injury    Amount and/or Complexity of Data Reviewed  Radiology: ordered and independent interpretation performed. Decision-making details documented in ED Course.    Risk  Prescription drug management.  Decision regarding  hospitalization.        Final diagnoses:   Injury of head, initial encounter   Dementia without behavioral disturbance       ED Disposition  ED Disposition       ED Disposition   Discharge    Condition   Stable    Comment   --               Janes Sanchez MD  [address] [place] [address]  [phone]               Medication List      No changes were made to your prescriptions during this visit.            Bassem Alvarado MD  07/11/25 [id number]

## 2025-07-19 ENCOUNTER — HOSPITAL ENCOUNTER (EMERGENCY)
Facility: HOSPITAL | Age: 65
Discharge: HOME | End: 2025-07-19
Attending: EMERGENCY MEDICINE
Payer: MEDICARE

## 2025-07-19 ENCOUNTER — APPOINTMENT (OUTPATIENT)
Dept: CT IMAGING | Facility: HOSPITAL | Age: 65
End: 2025-07-19
Payer: MEDICARE

## 2025-07-19 VITALS
SYSTOLIC BLOOD PRESSURE: 151 MMHG | BODY MASS INDEX: 22.02 KG/M2 | DIASTOLIC BLOOD PRESSURE: 80 MMHG | WEIGHT: 128.97 LBS | HEART RATE: 66 BPM | TEMPERATURE: 97.9 F | OXYGEN SATURATION: 97 % | HEIGHT: 64 IN | RESPIRATION RATE: 18 BRPM

## 2025-07-19 DIAGNOSIS — R26.81 GAIT INSTABILITY: Primary | ICD-10-CM

## 2025-07-19 DIAGNOSIS — G20.A1 PARKINSON'S DISEASE, UNSPECIFIED WHETHER DYSKINESIA PRESENT, UNSPECIFIED WHETHER MANIFESTATIONS FLUCTUATE: ICD-10-CM

## 2025-07-19 DIAGNOSIS — R29.6 FREQUENT FALLS: ICD-10-CM

## 2025-07-19 DIAGNOSIS — F03.C0 SEVERE DEMENTIA, UNSPECIFIED DEMENTIA TYPE, UNSPECIFIED WHETHER BEHAVIORAL, PSYCHOTIC, OR MOOD DISTURBANCE OR ANXIETY: ICD-10-CM

## 2025-07-19 LAB
ALBUMIN SERPL-MCNC: 4 G/DL (ref 3.5–5.2)
ALBUMIN/GLOB SERPL: 1.7 G/DL
ALP SERPL-CCNC: 73 U/L (ref 39–117)
ALT SERPL W P-5'-P-CCNC: <5 U/L (ref 1–33)
ANION GAP SERPL CALCULATED.3IONS-SCNC: 11.7 MMOL/L (ref 5–15)
AST SERPL-CCNC: 48 U/L (ref 1–32)
BACTERIA UR QL AUTO: ABNORMAL /HPF
BASOPHILS # BLD AUTO: 0.04 10*3/MM3 (ref 0–0.2)
BASOPHILS NFR BLD AUTO: 0.6 % (ref 0–1.5)
BILIRUB SERPL-MCNC: 0.6 MG/DL (ref 0–1.2)
BILIRUB UR QL STRIP: NEGATIVE
BUN SERPL-MCNC: 21.8 MG/DL (ref 8–23)
BUN/CREAT SERPL: 34.6 (ref 7–25)
CALCIUM SPEC-SCNC: 8.9 MG/DL (ref 8.6–10.5)
CHLORIDE SERPL-SCNC: 103 MMOL/L (ref 98–107)
CLARITY UR: CLEAR
CO2 SERPL-SCNC: 26.3 MMOL/L (ref 22–29)
COLOR UR: ABNORMAL
CREAT SERPL-MCNC: 0.63 MG/DL (ref 0.57–1)
D-LACTATE SERPL-SCNC: 0.9 MMOL/L (ref 0.5–2)
DEPRECATED RDW RBC AUTO: 45.2 FL (ref 37–54)
EGFRCR SERPLBLD CKD-EPI 2021: 98 ML/MIN/1.73
EOSINOPHIL # BLD AUTO: 0.13 10*3/MM3 (ref 0–0.4)
EOSINOPHIL NFR BLD AUTO: 1.9 % (ref 0.3–6.2)
ERYTHROCYTE [DISTWIDTH] IN BLOOD BY AUTOMATED COUNT: 12.9 % (ref 12.3–15.4)
GEN 5 1HR TROPONIN T REFLEX: 31 NG/L
GLOBULIN UR ELPH-MCNC: 2.4 GM/DL
GLUCOSE SERPL-MCNC: 89 MG/DL (ref 65–99)
GLUCOSE UR STRIP-MCNC: NEGATIVE MG/DL
HCT VFR BLD AUTO: 30.8 % (ref 34–46.6)
HGB BLD-MCNC: 10 G/DL (ref 12–15.9)
HGB UR QL STRIP.AUTO: NEGATIVE
HOLD SPECIMEN: NORMAL
HYALINE CASTS UR QL AUTO: ABNORMAL /LPF
IMM GRANULOCYTES # BLD AUTO: 0.02 10*3/MM3 (ref 0–0.05)
IMM GRANULOCYTES NFR BLD AUTO: 0.3 % (ref 0–0.5)
KETONES UR QL STRIP: ABNORMAL
LEUKOCYTE ESTERASE UR QL STRIP.AUTO: ABNORMAL
LIPASE SERPL-CCNC: 44 U/L (ref 13–60)
LYMPHOCYTES # BLD AUTO: 2.08 10*3/MM3 (ref 0.7–3.1)
LYMPHOCYTES NFR BLD AUTO: 30.5 % (ref 19.6–45.3)
MCH RBC QN AUTO: 31 PG (ref 26.6–33)
MCHC RBC AUTO-ENTMCNC: 32.5 G/DL (ref 31.5–35.7)
MCV RBC AUTO: 95.4 FL (ref 79–97)
MONOCYTES # BLD AUTO: 0.54 10*3/MM3 (ref 0.1–0.9)
MONOCYTES NFR BLD AUTO: 7.9 % (ref 5–12)
NEUTROPHILS NFR BLD AUTO: 4.02 10*3/MM3 (ref 1.7–7)
NEUTROPHILS NFR BLD AUTO: 58.8 % (ref 42.7–76)
NITRITE UR QL STRIP: NEGATIVE
NRBC BLD AUTO-RTO: 0 /100 WBC (ref 0–0.2)
NT-PROBNP SERPL-MCNC: 426 PG/ML (ref 0–900)
PH UR STRIP.AUTO: 6.5 [PH] (ref 5–8)
PLATELET # BLD AUTO: 235 10*3/MM3 (ref 140–450)
PMV BLD AUTO: 9.7 FL (ref 6–12)
POTASSIUM SERPL-SCNC: 3.6 MMOL/L (ref 3.5–5.2)
PROT SERPL-MCNC: 6.4 G/DL (ref 6–8.5)
PROT UR QL STRIP: ABNORMAL
RBC # BLD AUTO: 3.23 10*6/MM3 (ref 3.77–5.28)
RBC # UR STRIP: ABNORMAL /HPF
REF LAB TEST METHOD: ABNORMAL
SODIUM SERPL-SCNC: 141 MMOL/L (ref 136–145)
SP GR UR STRIP: >1.03 (ref 1–1.03)
SQUAMOUS #/AREA URNS HPF: ABNORMAL /HPF
TROPONIN T % DELTA: -11
TROPONIN T NUMERIC DELTA: -4 NG/L
TROPONIN T SERPL HS-MCNC: 35 NG/L
UROBILINOGEN UR QL STRIP: ABNORMAL
WBC # UR STRIP: ABNORMAL /HPF
WBC NRBC COR # BLD AUTO: 6.83 10*3/MM3 (ref 3.4–10.8)
WHOLE BLOOD HOLD COAG: NORMAL
WHOLE BLOOD HOLD SPECIMEN: NORMAL

## 2025-07-19 PROCEDURE — P9612 CATHETERIZE FOR URINE SPEC: HCPCS

## 2025-07-19 PROCEDURE — 93005 ELECTROCARDIOGRAM TRACING: CPT | Performed by: EMERGENCY MEDICINE

## 2025-07-19 PROCEDURE — 83605 ASSAY OF LACTIC ACID: CPT | Performed by: EMERGENCY MEDICINE

## 2025-07-19 PROCEDURE — 36415 COLL VENOUS BLD VENIPUNCTURE: CPT

## 2025-07-19 PROCEDURE — 70450 CT HEAD/BRAIN W/O DYE: CPT

## 2025-07-19 PROCEDURE — 85025 COMPLETE CBC W/AUTO DIFF WBC: CPT | Performed by: EMERGENCY MEDICINE

## 2025-07-19 PROCEDURE — 81001 URINALYSIS AUTO W/SCOPE: CPT | Performed by: EMERGENCY MEDICINE

## 2025-07-19 PROCEDURE — 83690 ASSAY OF LIPASE: CPT | Performed by: EMERGENCY MEDICINE

## 2025-07-19 PROCEDURE — 99284 EMERGENCY DEPT VISIT MOD MDM: CPT

## 2025-07-19 PROCEDURE — 80053 COMPREHEN METABOLIC PANEL: CPT | Performed by: EMERGENCY MEDICINE

## 2025-07-19 PROCEDURE — 84484 ASSAY OF TROPONIN QUANT: CPT | Performed by: EMERGENCY MEDICINE

## 2025-07-19 PROCEDURE — 83880 ASSAY OF NATRIURETIC PEPTIDE: CPT | Performed by: EMERGENCY MEDICINE

## 2025-07-19 RX ORDER — SODIUM CHLORIDE 0.9 % (FLUSH) 0.9 %
10 SYRINGE (ML) INJECTION AS NEEDED
Status: DISCONTINUED | OUTPATIENT
Start: 2025-07-19 | End: 2025-07-20 | Stop reason: HOSPADM

## 2025-07-19 NOTE — ED NOTES
This RN spoke with Angel at [facility]. He states that the pt has had multiple falls over the last 2 days with 3 falls today. They have been unable to reach the pts POA for 3 days and sent her out for further evaluation. Pt is at baseline per facility. Pt does not use any assistive devices to ambulate and is typically non verbal GCS 14.

## 2025-07-19 NOTE — ED PROVIDER NOTES
Subjective   History of Present Illness  Patient is a 65-year-old female with Parkinson's disease and Alzheimer's with advanced dementia presenting to the emergency department with falls.  Patient has had multiple evaluations for similar.  EMS reports the patient has had multiple falls over the last 2 days.  Patient is noncontributory to her history.  Per chart review, the patient is not on any anticoagulation at this time.  Patient is in no obvious pain, but is also unable to provide any feedback.    History provided by:  Patient and EMS personnel  History limited by:  Dementia and patient nonverbal      Review of Systems    Past Medical History:   Diagnosis Date    Allergic     One shot every 2 weeks    Anxiety 10/1/2015    Arthritis Dec 2022    75 mg Twice/day    Cancer     Dementia     Depression 10/1/15    90 mg. dulexatine daily    Hyperthyroidism Pre 2013    Hypothyroidism 21    Parkinson's disease     Urinary tract infection 23       Allergies   Allergen Reactions    Codeine Palpitations    Epinephrine Dizziness and Other (See Comments)       Past Surgical History:   Procedure Laterality Date    BREAST SURGERY  10/5/2014    lumpectomy    COLONOSCOPY  ??? 2018    Luis Eli MD twice [facility]       Family History   Problem Relation Age of Onset    Cancer Mother         bladder 2022    Alcohol abuse Father          92    Early death Father         60 yr old Heart attack    Hyperlipidemia Father         3 corotoid surgeries    Cancer Maternal Aunt         breast    Stroke Sister         double stroke 2022       Social History     Socioeconomic History    Marital status:    Tobacco Use    Smoking status: Never     Passive exposure: Never    Smokeless tobacco: Never   Vaping Use    Vaping status: Never Used   Substance and Sexual Activity    Alcohol use: Not Currently     Alcohol/week: 14.0 standard drinks of alcohol     Comment: denies x 3 weeks    Drug use: Never     Sexual activity: Not Currently     Partners: Male     Birth control/protection: Post-menopausal           Objective   Physical Exam  Vitals and nursing note reviewed.   Constitutional:       General: She is not in acute distress.     Appearance: She is not toxic-appearing.   Cardiovascular:      Rate and Rhythm: Normal rate and regular rhythm.      Pulses: Normal pulses.   Pulmonary:      Effort: Pulmonary effort is normal. No respiratory distress.      Breath sounds: Normal breath sounds.   Musculoskeletal:         General: No deformity or signs of injury.   Neurological:      Mental Status: Mental status is at baseline.         Procedures           ED Course  ED Course as of 07/20/25 0024   Sat Jul 19, 2025 1954 Nursing staff did talk with the [facility] facility and confirmed that the patient is currently at her baseline mental status and is at baseline nonverbal. [RS]   2049 Hemoglobin(!): 10.0  Stable anemia when compared to the most recent value from 3 weeks ago. [RS]   2051 CT Head Without Contrast  Personally reviewed the CT images of the head.  On my interpretation there is no hemorrhage or mass effect visualized [RS]   2155 Comprehensive Metabolic Panel(!)  CMP reviewed and overall unremarkable. [RS]   2155 Urinalysis, Microscopic Only - Urine, Catheter(!)  Urinalysis reviewed without evidence of acute infection. [RS]   2155 Patient with progressive worsening gait disturbance and falls likely related to the Parkinson's disease and advanced dementia.  At this point no clear emergent findings on her evaluation here in the ER.  No evidence of acute infection.  Will plan to discharge the patient home to continue care.  It is strongly recommended that the patient use ambulatory assistance devices to decrease the risk of falls. Note to patient: The 21st Century Cures Act makes medical notes like these available to patients in the interest of transparency. However, be advised this is a medical document.  It is intended as peer to peer communication. It is written in medical language and may contain abbreviations or verbiage that are unfamiliar. It may appear blunt or direct. Medical documents are intended to carry relevant information, facts as evident, and the clinical opinion of the physician/NPP.   [RS]      ED Course User Index  [RS] Senthil Armenta MD                                                       Medical Decision Making  Problems Addressed:  Frequent falls: complicated acute illness or injury  Gait instability: complicated acute illness or injury  Parkinson's disease, unspecified whether dyskinesia present, unspecified whether manifestations fluctuate: complicated acute illness or injury  Severe dementia, unspecified dementia type, unspecified whether behavioral, psychotic, or mood disturbance or anxiety: complicated acute illness or injury    Amount and/or Complexity of Data Reviewed  Independent Historian: EMS  Labs: ordered. Decision-making details documented in ED Course.  Radiology: ordered. Decision-making details documented in ED Course.  ECG/medicine tests: ordered.    Risk  Prescription drug management.        Final diagnoses:   Gait instability   Parkinson's disease, unspecified whether dyskinesia present, unspecified whether manifestations fluctuate   Severe dementia, unspecified dementia type, unspecified whether behavioral, psychotic, or mood disturbance or anxiety   Frequent falls       ED Disposition  ED Disposition       ED Disposition   Discharge    Condition   Stable    Comment   --               Janes Sanchez MD  [address] [id number]  [phone]    Schedule an appointment as soon as possible for a visit       [facility] EMERGENCY DEPARTMENT  [address] [address] [phone]    As needed, If symptoms worsen or ANY concerns.         Medication List      No changes were made to your prescriptions during this  visit.            Senthil Armenta MD  07/20/25 0024

## 2025-07-20 LAB
QT INTERVAL: 382 MS
QTC INTERVAL: 440 MS

## 2025-07-20 NOTE — DISCHARGE INSTRUCTIONS
Strongly encourage ambulatory assistance devices for the patient to decrease fall risk.  Patient will have increasing fall risk with the ongoing progression of her cognitive decline and Parkinson's disease.

## 2025-07-24 ENCOUNTER — EXTERNAL PBMM DATA (OUTPATIENT)
Dept: PHARMACY | Facility: OTHER | Age: 65
End: 2025-07-24
Payer: MEDICARE

## 2025-07-27 ENCOUNTER — APPOINTMENT (OUTPATIENT)
Dept: GENERAL RADIOLOGY | Facility: HOSPITAL | Age: 65
End: 2025-07-27
Payer: MEDICARE

## 2025-07-27 ENCOUNTER — HOSPITAL ENCOUNTER (EMERGENCY)
Facility: HOSPITAL | Age: 65
Discharge: NURSING, REHAB OR LONG-TERM CARE | End: 2025-07-27
Attending: EMERGENCY MEDICINE | Admitting: EMERGENCY MEDICINE
Payer: MEDICARE

## 2025-07-27 ENCOUNTER — APPOINTMENT (OUTPATIENT)
Dept: CT IMAGING | Facility: HOSPITAL | Age: 65
End: 2025-07-27
Payer: MEDICARE

## 2025-07-27 VITALS
OXYGEN SATURATION: 88 % | HEART RATE: 103 BPM | DIASTOLIC BLOOD PRESSURE: 71 MMHG | SYSTOLIC BLOOD PRESSURE: 119 MMHG | WEIGHT: 128.97 LBS | TEMPERATURE: 98.4 F | HEIGHT: 64 IN | RESPIRATION RATE: 18 BRPM | BODY MASS INDEX: 22.02 KG/M2

## 2025-07-27 DIAGNOSIS — G20.B1 PARKINSON'S DISEASE WITH DYSKINESIA, UNSPECIFIED WHETHER MANIFESTATIONS FLUCTUATE: ICD-10-CM

## 2025-07-27 DIAGNOSIS — G20.A1 SEVERE DEMENTIA DUE TO PARKINSON'S DISEASE, UNSPECIFIED WHETHER BEHAVIORAL, PSYCHOTIC, OR MOOD DISTURBANCE OR ANXIETY: ICD-10-CM

## 2025-07-27 DIAGNOSIS — F02.C0 SEVERE DEMENTIA DUE TO PARKINSON'S DISEASE, UNSPECIFIED WHETHER BEHAVIORAL, PSYCHOTIC, OR MOOD DISTURBANCE OR ANXIETY: ICD-10-CM

## 2025-07-27 DIAGNOSIS — S63.501A SPRAIN OF RIGHT WRIST, INITIAL ENCOUNTER: Primary | ICD-10-CM
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
## 2025-07-27 PROCEDURE — 73110 X-RAY EXAM OF WRIST: CPT

## 2025-07-27 PROCEDURE — 99284 EMERGENCY DEPT VISIT MOD MDM: CPT

## 2025-07-27 PROCEDURE — 70450 CT HEAD/BRAIN W/O DYE: CPT

## 2025-07-28 NOTE — ED PROVIDER NOTES
Subjective   History of Present Illness  Patient is a 65-year-old female with advanced Parkinson's disease who presents the emergency department via EMS after a fall.  Patient fell to the right.  Reports a possible injury to the right wrist.  Patient may or may not have hit her head.  Unknown loss of consciousness.  Patient has frequent falls secondary to her history of Parkinson's disease.  Patient is noncontributory to the history secondary to her advanced dementia.    History provided by:  EMS personnel and patient  History limited by:  Dementia      Review of Systems    Past Medical History:   Diagnosis Date    Allergic     One shot every 2 weeks    Anxiety 10/1/2015    Arthritis Dec 2022    75 mg Twice/day    Cancer     Dementia     Depression 10/1/15    90 mg. dulexatine daily    Hyperthyroidism Pre 2013    Hypothyroidism 21    Parkinson's disease     Urinary tract infection 23       Allergies   Allergen Reactions    Codeine Palpitations    Epinephrine Dizziness and Other (See Comments)       Past Surgical History:   Procedure Laterality Date    BREAST SURGERY  10/5/2014    lumpectomy    COLONOSCOPY  ??? 2018    Luis Eli MD [facility]       Family History   Problem Relation Age of Onset    Cancer Mother         bladder 2022    Alcohol abuse Father          92    Early death Father         60 yr old Heart attack    Hyperlipidemia Father         3 corotoid surgeries    Cancer Maternal Aunt         breast    Stroke Sister         double stroke 2022       Social History     Socioeconomic History    Marital status:    Tobacco Use    Smoking status: Never     Passive exposure: Never    Smokeless tobacco: Never   Vaping Use    Vaping status: Never Used   Substance and Sexual Activity    Alcohol use: Not Currently     Alcohol/week: 14.0 standard drinks of alcohol     Comment: denies x 3 weeks    Drug use: Never    Sexual activity: Not Currently     Partners: Male      Birth control/protection: Post-menopausal           Objective   Physical Exam  Vitals and nursing note reviewed.   Constitutional:       General: She is not in acute distress.     Appearance: She is ill-appearing. She is not toxic-appearing.   HENT:      Head: Atraumatic.   Cardiovascular:      Rate and Rhythm: Normal rate and regular rhythm.      Pulses: Normal pulses.   Pulmonary:      Effort: Pulmonary effort is normal. No respiratory distress.      Breath sounds: Normal breath sounds.   Abdominal:      Palpations: Abdomen is soft.   Musculoskeletal:      Comments: There is some ecchymoses noted on the volar aspect of the right wrist.  There is no gross deformity.  Skin is intact.  Cap refill is intact.  Pulses are intact.   Skin:     General: Skin is warm and dry.   Neurological:      Mental Status: She is alert. Mental status is at baseline.         Procedures           ED Course  ED Course as of 07/27/25 2232   Sun Jul 27, 2025   1901 XR Wrist 3+ View Right  Personally reviewed the 3 views of the right wrist.  On my interpretation there is some degenerative changes but no displaced fracture visualized. [RS]   1926 CT Head Without Contrast  Personally reviewed the CT images of the head.  On my interpretation there is no hemorrhage or mass effect visualized. [RS]   1926 Patient is resting comfortably.  No emergent findings or evaluation here in the ER.  Patient with mechanical fall likely exacerbated by advanced Parkinson's dementia.  No emergent findings on her evaluation. I have reviewed results, considerations, and diagnosis with the patient and/or their representative. Anticipatory guidance provided. Follow-up plan reviewed. Precautions for acute return for re-evaluations also reviewed. This including potential for worsening of the presenting condition and need for further evaluation, admission, and/or intervention as indicated. Opportunity to as questions provided. I advised them to return for any  concerns and stressed the importance of timely follow-up and outpatient services. They verbalized understanding.   [RS]   1927 Note to patient: The 21st Century Cures Act makes medical notes like these available to patients in the interest of transparency. However, be advised this is a medical document. It is intended as peer to peer communication. It is written in medical language and may contain abbreviations or verbiage that are unfamiliar. It may appear blunt or direct. Medical documents are intended to carry relevant information, facts as evident, and the clinical opinion of the physician/NPP.   [RS]      ED Course User Index  [RS] Senthil Armenta MD                                                       Medical Decision Making  Problems Addressed:  Fall, initial encounter: complicated acute illness or injury  Parkinson's disease with dyskinesia, unspecified whether manifestations fluctuate: complicated acute illness or injury  Severe dementia due to Parkinson's disease, unspecified whether behavioral, psychotic, or mood disturbance or anxiety: complicated acute illness or injury  Sprain of right wrist, initial encounter: complicated acute illness or injury    Amount and/or Complexity of Data Reviewed  Independent Historian: EMS  Radiology: ordered. Decision-making details documented in ED Course.        Final diagnoses:   Sprain of right wrist, initial encounter   Fall, initial encounter   Parkinson's disease with dyskinesia, unspecified whether manifestations fluctuate   Severe dementia due to Parkinson's disease, unspecified whether behavioral, psychotic, or mood disturbance or anxiety       ED Disposition  ED Disposition       ED Disposition   Discharge    Condition   Stable    Comment   --               Janes Sanchez MD  [address] [facility] [id number] [phone]      As needed    [facility] EMERGENCY DEPARTMENT  [address]  [id number] [phone]    As needed, If symptoms worsen or ANY concerns.         Medication List      No changes were made to your prescriptions during this visit.            Senthil Armenta MD  07/27/25 8526

## 2025-08-07 ENCOUNTER — EXTERNAL PBMM DATA (OUTPATIENT)
Dept: PHARMACY | Facility: OTHER | Age: 65
End: 2025-08-07
Payer: MEDICARE

## 2025-08-13 ENCOUNTER — APPOINTMENT (OUTPATIENT)
Dept: CT IMAGING | Facility: HOSPITAL | Age: 65
End: 2025-08-13
Payer: MEDICARE

## 2025-08-13 ENCOUNTER — APPOINTMENT (OUTPATIENT)
Dept: CARDIOLOGY | Facility: HOSPITAL | Age: 65
End: 2025-08-13
Payer: MEDICARE

## 2025-08-13 ENCOUNTER — APPOINTMENT (OUTPATIENT)
Dept: NEUROLOGY | Facility: HOSPITAL | Age: 65
End: 2025-08-13
Payer: MEDICARE

## 2025-08-13 ENCOUNTER — HOSPITAL ENCOUNTER (INPATIENT)
Facility: HOSPITAL | Age: 65
LOS: 4 days | Discharge: NURSING, REHAB OR LONG-TERM CARE | End: 2025-08-19
Attending: EMERGENCY MEDICINE | Admitting: INTERNAL MEDICINE
Payer: MEDICARE

## 2025-08-13 PROBLEM — S70.01XA HEMATOMA OF RIGHT HIP, INITIAL ENCOUNTER: Status: ACTIVE | Noted: 2025-08-13

## 2025-08-14 ENCOUNTER — APPOINTMENT (OUTPATIENT)
Dept: GENERAL RADIOLOGY | Facility: HOSPITAL | Age: 65
End: 2025-08-14
Payer: MEDICARE

## 2025-08-14 ENCOUNTER — APPOINTMENT (OUTPATIENT)
Dept: CARDIOLOGY | Facility: HOSPITAL | Age: 65
End: 2025-08-14
Payer: MEDICARE

## 2025-08-15 ENCOUNTER — APPOINTMENT (OUTPATIENT)
Dept: MRI IMAGING | Facility: HOSPITAL | Age: 65
End: 2025-08-15
Payer: MEDICARE

## 2025-08-19 PROBLEM — I95.1 ORTHOSTATIC HYPOTENSION: Status: ACTIVE | Noted: 2025-08-19

## 2025-08-21 ENCOUNTER — EXTERNAL PBMM DATA (OUTPATIENT)
Dept: PHARMACY | Facility: OTHER | Age: 65
End: 2025-08-21
Payer: MEDICARE

## 2025-08-27 ENCOUNTER — LAB (OUTPATIENT)
Dept: LAB | Facility: HOSPITAL | Age: 65
End: 2025-08-27
Payer: MEDICARE

## 2025-08-27 ENCOUNTER — OFFICE VISIT (OUTPATIENT)
Dept: FAMILY MEDICINE CLINIC | Facility: CLINIC | Age: 65
End: 2025-08-27
Payer: MEDICARE

## 2025-08-27 VITALS
DIASTOLIC BLOOD PRESSURE: 62 MMHG | OXYGEN SATURATION: 96 % | WEIGHT: 118.8 LBS | SYSTOLIC BLOOD PRESSURE: 108 MMHG | HEART RATE: 63 BPM | BODY MASS INDEX: 20.28 KG/M2 | HEIGHT: 64 IN

## 2025-08-27 DIAGNOSIS — D64.9 ANEMIA, UNSPECIFIED TYPE: ICD-10-CM

## 2025-08-27 DIAGNOSIS — D64.9 ANEMIA, UNSPECIFIED TYPE: Primary | ICD-10-CM

## 2025-08-27 DIAGNOSIS — G20.B2 PARKINSON'S DISEASE WITH DYSKINESIA AND FLUCTUATING MANIFESTATIONS: ICD-10-CM

## 2025-08-27 LAB
BASOPHILS # BLD AUTO: 0.04 10*3/MM3 (ref 0–0.2)
BASOPHILS NFR BLD AUTO: 0.8 % (ref 0–1.5)
DEPRECATED RDW RBC AUTO: 47.3 FL (ref 37–54)
EOSINOPHIL # BLD AUTO: 0.08 10*3/MM3 (ref 0–0.4)
EOSINOPHIL NFR BLD AUTO: 1.5 % (ref 0.3–6.2)
ERYTHROCYTE [DISTWIDTH] IN BLOOD BY AUTOMATED COUNT: 13.2 % (ref 12.3–15.4)
HCT VFR BLD AUTO: 31.8 % (ref 34–46.6)
HGB BLD-MCNC: 10.2 G/DL (ref 12–15.9)
IMM GRANULOCYTES # BLD AUTO: 0.02 10*3/MM3 (ref 0–0.05)
IMM GRANULOCYTES NFR BLD AUTO: 0.4 % (ref 0–0.5)
LYMPHOCYTES # BLD AUTO: 1.19 10*3/MM3 (ref 0.7–3.1)
LYMPHOCYTES NFR BLD AUTO: 22.7 % (ref 19.6–45.3)
MCH RBC QN AUTO: 31.9 PG (ref 26.6–33)
MCHC RBC AUTO-ENTMCNC: 32.1 G/DL (ref 31.5–35.7)
MCV RBC AUTO: 99.4 FL (ref 79–97)
MONOCYTES # BLD AUTO: 0.41 10*3/MM3 (ref 0.1–0.9)
MONOCYTES NFR BLD AUTO: 7.8 % (ref 5–12)
NEUTROPHILS NFR BLD AUTO: 3.5 10*3/MM3 (ref 1.7–7)
NEUTROPHILS NFR BLD AUTO: 66.8 % (ref 42.7–76)
NRBC BLD AUTO-RTO: 0 /100 WBC (ref 0–0.2)
PLATELET # BLD AUTO: 348 10*3/MM3 (ref 140–450)
PMV BLD AUTO: 9.3 FL (ref 6–12)
RBC # BLD AUTO: 3.2 10*6/MM3 (ref 3.77–5.28)
WBC NRBC COR # BLD AUTO: 5.24 10*3/MM3 (ref 3.4–10.8)

## 2025-08-27 PROCEDURE — 80053 COMPREHEN METABOLIC PANEL: CPT

## 2025-08-27 PROCEDURE — 85025 COMPLETE CBC W/AUTO DIFF WBC: CPT

## 2025-08-28 LAB
ALBUMIN SERPL-MCNC: 4 G/DL (ref 3.5–5.2)
ALBUMIN/GLOB SERPL: 1.4 G/DL
ALP SERPL-CCNC: 84 U/L (ref 39–117)
ALT SERPL W P-5'-P-CCNC: 8 U/L (ref 1–33)
ANION GAP SERPL CALCULATED.3IONS-SCNC: 11.7 MMOL/L (ref 5–15)
AST SERPL-CCNC: 22 U/L (ref 1–32)
BILIRUB SERPL-MCNC: 0.4 MG/DL (ref 0–1.2)
BUN SERPL-MCNC: 17 MG/DL (ref 8–23)
BUN/CREAT SERPL: 23.3 (ref 7–25)
CALCIUM SPEC-SCNC: 9.4 MG/DL (ref 8.6–10.5)
CHLORIDE SERPL-SCNC: 104 MMOL/L (ref 98–107)
CO2 SERPL-SCNC: 26.3 MMOL/L (ref 22–29)
CREAT SERPL-MCNC: 0.73 MG/DL (ref 0.57–1)
EGFRCR SERPLBLD CKD-EPI 2021: 90.8 ML/MIN/1.73
GLOBULIN UR ELPH-MCNC: 2.9 GM/DL
GLUCOSE SERPL-MCNC: 89 MG/DL (ref 65–99)
POTASSIUM SERPL-SCNC: 4 MMOL/L (ref 3.5–5.2)
PROT SERPL-MCNC: 6.9 G/DL (ref 6–8.5)
SODIUM SERPL-SCNC: 142 MMOL/L (ref 136–145)